# Patient Record
Sex: MALE | Race: BLACK OR AFRICAN AMERICAN | Employment: OTHER | URBAN - METROPOLITAN AREA
[De-identification: names, ages, dates, MRNs, and addresses within clinical notes are randomized per-mention and may not be internally consistent; named-entity substitution may affect disease eponyms.]

---

## 2017-01-01 ENCOUNTER — OFFICE VISIT (OUTPATIENT)
Dept: ORTHOPEDIC SURGERY | Facility: CLINIC | Age: 82
End: 2017-01-01

## 2017-01-01 ENCOUNTER — OFFICE VISIT (OUTPATIENT)
Dept: ORTHOPEDIC SURGERY | Age: 82
End: 2017-01-01

## 2017-01-01 VITALS
HEIGHT: 70 IN | RESPIRATION RATE: 18 BRPM | OXYGEN SATURATION: 100 % | DIASTOLIC BLOOD PRESSURE: 63 MMHG | BODY MASS INDEX: 24.6 KG/M2 | WEIGHT: 171.8 LBS | HEART RATE: 72 BPM | SYSTOLIC BLOOD PRESSURE: 171 MMHG | TEMPERATURE: 95.1 F

## 2017-01-01 VITALS
HEART RATE: 80 BPM | OXYGEN SATURATION: 97 % | SYSTOLIC BLOOD PRESSURE: 149 MMHG | BODY MASS INDEX: 24.39 KG/M2 | RESPIRATION RATE: 18 BRPM | DIASTOLIC BLOOD PRESSURE: 49 MMHG | HEIGHT: 70 IN | WEIGHT: 170.4 LBS | TEMPERATURE: 98.3 F

## 2017-01-01 VITALS
TEMPERATURE: 98 F | RESPIRATION RATE: 16 BRPM | WEIGHT: 170.2 LBS | DIASTOLIC BLOOD PRESSURE: 66 MMHG | HEART RATE: 86 BPM | SYSTOLIC BLOOD PRESSURE: 165 MMHG | BODY MASS INDEX: 24.42 KG/M2

## 2017-01-01 DIAGNOSIS — M48.061 LUMBAR STENOSIS: Primary | Chronic | ICD-10-CM

## 2017-01-01 DIAGNOSIS — M25.511 CHRONIC RIGHT SHOULDER PAIN: ICD-10-CM

## 2017-01-01 DIAGNOSIS — M25.551 RIGHT HIP PAIN: Primary | ICD-10-CM

## 2017-01-01 DIAGNOSIS — M19.011 ARTHROSIS OF RIGHT ACROMIOCLAVICULAR JOINT: ICD-10-CM

## 2017-01-01 DIAGNOSIS — M12.811 ROTATOR CUFF ARTHROPATHY, RIGHT: ICD-10-CM

## 2017-01-01 DIAGNOSIS — M19.011 PRIMARY OSTEOARTHRITIS OF RIGHT SHOULDER: Primary | ICD-10-CM

## 2017-01-01 DIAGNOSIS — M48.062 SPINAL STENOSIS OF LUMBAR REGION WITH NEUROGENIC CLAUDICATION: Chronic | ICD-10-CM

## 2017-01-01 DIAGNOSIS — G89.29 CHRONIC RIGHT SHOULDER PAIN: ICD-10-CM

## 2017-01-01 DIAGNOSIS — M85.80 OSTEOPENIA DETERMINED BY X-RAY: ICD-10-CM

## 2017-01-01 RX ORDER — DICLOFENAC SODIUM 10 MG/G
GEL TOPICAL
Refills: 0 | COMMUNITY
Start: 2017-01-01

## 2017-01-01 RX ORDER — BETAMETHASONE SODIUM PHOSPHATE AND BETAMETHASONE ACETATE 3; 3 MG/ML; MG/ML
6 INJECTION, SUSPENSION INTRA-ARTICULAR; INTRALESIONAL; INTRAMUSCULAR; SOFT TISSUE ONCE
Qty: 0.5 ML | Refills: 0
Start: 2017-01-01 | End: 2017-01-01

## 2017-01-01 RX ORDER — HYDROCODONE BITARTRATE AND ACETAMINOPHEN 10; 325 MG/1; MG/1
1 TABLET ORAL
Qty: 28 TAB | Refills: 0 | Status: SHIPPED | OUTPATIENT
Start: 2017-01-01 | End: 2018-01-01 | Stop reason: SDUPTHER

## 2017-01-01 RX ORDER — HYDROCODONE BITARTRATE AND ACETAMINOPHEN 10; 325 MG/1; MG/1
1 TABLET ORAL
Qty: 30 TAB | Refills: 0 | Status: SHIPPED | OUTPATIENT
Start: 2017-01-01 | End: 2017-01-01 | Stop reason: SDUPTHER

## 2017-01-01 RX ORDER — LANOLIN ALCOHOL/MO/W.PET/CERES
CREAM (GRAM) TOPICAL
Refills: 0 | COMMUNITY
Start: 2017-01-01

## 2017-01-01 RX ORDER — BUPIVACAINE HYDROCHLORIDE 2.5 MG/ML
4 INJECTION, SOLUTION EPIDURAL; INFILTRATION; INTRACAUDAL ONCE
Qty: 4 ML | Refills: 0
Start: 2017-01-01 | End: 2017-01-01

## 2017-01-01 RX ORDER — AZITHROMYCIN 250 MG/1
TABLET, FILM COATED ORAL
Refills: 0 | Status: ON HOLD | COMMUNITY
Start: 2017-01-01 | End: 2018-01-01

## 2017-01-03 ENCOUNTER — APPOINTMENT (OUTPATIENT)
Dept: GENERAL RADIOLOGY | Age: 82
End: 2017-01-03
Attending: PHYSICAL MEDICINE & REHABILITATION
Payer: MEDICARE

## 2017-01-03 ENCOUNTER — SURGERY (OUTPATIENT)
Age: 82
End: 2017-01-03

## 2017-01-03 PROCEDURE — 77003 FLUOROGUIDE FOR SPINE INJECT: CPT

## 2017-01-03 RX ADMIN — DEXAMETHASONE SODIUM PHOSPHATE 30 MG: 10 INJECTION INTRAMUSCULAR; INTRAVENOUS at 13:33

## 2017-01-03 RX ADMIN — LIDOCAINE HYDROCHLORIDE 10 ML: 10 INJECTION, SOLUTION EPIDURAL; INFILTRATION; INTRACAUDAL; PERINEURAL at 13:34

## 2017-01-03 RX ADMIN — IOPAMIDOL 1 ML: 408 INJECTION, SOLUTION INTRATHECAL at 13:34

## 2017-01-05 ENCOUNTER — OFFICE VISIT (OUTPATIENT)
Dept: ORTHOPEDIC SURGERY | Facility: CLINIC | Age: 82
End: 2017-01-05

## 2017-01-05 VITALS
SYSTOLIC BLOOD PRESSURE: 136 MMHG | HEART RATE: 70 BPM | WEIGHT: 171 LBS | TEMPERATURE: 98 F | BODY MASS INDEX: 24.54 KG/M2 | DIASTOLIC BLOOD PRESSURE: 56 MMHG

## 2017-01-05 DIAGNOSIS — M48.061 LUMBAR SPINAL STENOSIS: ICD-10-CM

## 2017-01-05 DIAGNOSIS — M25.551 ARTHRALGIA OF RIGHT HIP: ICD-10-CM

## 2017-01-05 DIAGNOSIS — M70.61 TROCHANTERIC BURSITIS OF RIGHT HIP: ICD-10-CM

## 2017-01-05 DIAGNOSIS — M25.511 CHRONIC RIGHT SHOULDER PAIN: ICD-10-CM

## 2017-01-05 DIAGNOSIS — M54.41 CHRONIC BILATERAL LOW BACK PAIN WITH RIGHT-SIDED SCIATICA: ICD-10-CM

## 2017-01-05 DIAGNOSIS — M48.061 LUMBAR STENOSIS: ICD-10-CM

## 2017-01-05 DIAGNOSIS — M19.011 PRIMARY OSTEOARTHRITIS OF RIGHT SHOULDER: ICD-10-CM

## 2017-01-05 DIAGNOSIS — M25.511 RIGHT SHOULDER PAIN, UNSPECIFIED CHRONICITY: ICD-10-CM

## 2017-01-05 DIAGNOSIS — M19.011 ARTHROSIS OF RIGHT ACROMIOCLAVICULAR JOINT: ICD-10-CM

## 2017-01-05 DIAGNOSIS — G89.29 CHRONIC RIGHT SHOULDER PAIN: ICD-10-CM

## 2017-01-05 DIAGNOSIS — M12.811 ROTATOR CUFF ARTHROPATHY, RIGHT: Primary | ICD-10-CM

## 2017-01-05 DIAGNOSIS — G89.29 CHRONIC BILATERAL LOW BACK PAIN WITH RIGHT-SIDED SCIATICA: ICD-10-CM

## 2017-01-05 DIAGNOSIS — M51.26 HNP (HERNIATED NUCLEUS PULPOSUS), LUMBAR: ICD-10-CM

## 2017-01-05 DIAGNOSIS — M75.51 SHOULDER BURSITIS, RIGHT: ICD-10-CM

## 2017-01-05 DIAGNOSIS — M85.80 OSTEOPENIA DETERMINED BY X-RAY: ICD-10-CM

## 2017-01-05 RX ORDER — BETAMETHASONE SODIUM PHOSPHATE AND BETAMETHASONE ACETATE 3; 3 MG/ML; MG/ML
3 INJECTION, SUSPENSION INTRA-ARTICULAR; INTRALESIONAL; INTRAMUSCULAR; SOFT TISSUE ONCE
Qty: 0.5 ML | Refills: 0
Start: 2017-01-05 | End: 2017-01-05

## 2017-01-05 RX ORDER — BUPIVACAINE HYDROCHLORIDE 2.5 MG/ML
4 INJECTION, SOLUTION EPIDURAL; INFILTRATION; INTRACAUDAL ONCE
Qty: 4 ML | Refills: 0
Start: 2017-01-05 | End: 2017-01-05

## 2017-01-05 NOTE — PROGRESS NOTES
Patient: Rocco Maldonado MRN: [id number]       SSN: [id number]  YOB: 1930        AGE: 80 y.o. SEX: male    PCP: Aminah Locke MD  01/05/17    Chief Complaint   Patient presents with    Shoulder Pain     Right     HISTORY:  Rocco Yadav is a 80 y.o. male who is seen for right shoulder pain. He reports increased shoulder pain since 12/25/16. He was previously seen on 1/4/13 for shoulder pain. He has a h/o right shoulder arthritis and rotator cuff arthropathy in the past.  He has received injections from Dr. Felder Collar years ago. He notes pain with overhead activities and wood-working. He notes extreme pain at times when he raises his arm overhead. He reports increased shoulder pain while doing strength training at home recently. He denies any previous shoulder injury or trauma. He is using a single-point cane to ambulate. Occupation, etc:  Mr. 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 He retired in [place] [address] as a . 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 Current weight is 171 pounds. He has high blood pressure. He is not diabetic. He reports a h/o gout. He states that he almost cut his entire right hand off when he was 25years old as a result of a bakery accident. He is currently taking Coumadin. He is accompanied to the office by his daughter today who helps to take care of him. He lives with his four daughters in [facility].       Weight Metrics 1/5/2017 1/3/2017 12/21/2016 12/12/2016 10/19/2016 9/15/2016 8/30/2016   Weight 171 lb 174 lb 178 lb 178 lb 12.8 oz 175 lb 188 lb 3.2 oz 174 lb   BMI 24.54 kg/m2 24.97 kg/m2 25.54 kg/m2 25.66 kg/m2 25.11 kg/m2 27 kg/m2 24.97 kg/m2     Patient Active Problem List   Diagnosis Code    Renal mass N28.89    Back pain M54.9    Prostate cancer ([facility] C61    Gout M10.9    Asthma J45.909    Hypertension I10    Thyroid disease E07.9    [facility] C79.51    Lumbar stenosis M48.06    Lumbar neuritis M54.16    Chronic bilateral low back pain with sciatica M54.40, G89.29     REVIEW OF SYSTEMS: All Below are Negative except: See HPI   Constitutional: negative for fever, chills, and weight loss. Cardiovascular: negative for chest pain, claudication, leg swelling, SOB, PRIETO   Gastrointestinal: Negative for pain, N/V/C/D, Blood in stool or urine, dysuria,  hematuria, incontinence, pelvic pain. Musculoskeletal: See HPI   Neurological: Negative for dizziness and weakness. Negative for headaches, Visual changes, confusion, seizures   Phychiatric/Behavioral: Negative for depression, memory loss, substance  abuse. Extremities: Negative for hair changes, rash, or skin lesion changes. Hematologic: Negative for bleeding problems, bruising, pallor or swollen lymph  nodes   Peripheral Vascular: No calf pain, no circulation deficits. Social History     Social History    Marital status: SINGLE     Spouse name: N/A    Number of children: N/A    Years of education: N/A     Occupational History    Not on file. Social History Main Topics    Smoking status: Former Smoker    Smokeless tobacco: Never Used      Comment: Quit in 1953    Alcohol use No    Drug use: No    Sexual activity: Not on file     Other Topics Concern    Not on file     Social History Narrative      Allergies   Allergen Reactions    Sulfur Rash    Cephalexin Swelling    Colchicine Swelling    Tramadol Nausea Only      Current Outpatient Prescriptions   Medication Sig    nortriptyline (PAMELOR) 10 mg capsule Take 1 capsule by mouth  nightly    triamcinolone acetonide (KENALOG) 0.1 % topical cream Apply  to affected area.  COMBIGAN 0.2-0.5 % drop ophthalmic solution Administer 1 Drop to both eyes every twelve (12) hours.  lisinopril (PRINIVIL, ZESTRIL) 20 mg tablet Take 20 mg by mouth.     latanoprost (XALATAN) 0.005 % ophthalmic solution Administer 1 Drop to both eyes nightly.  lactulose (CHRONULAC) 10 gram/15 mL solution     amLODIPine (NORVASC) 10 mg tablet Take 10 mg by mouth daily.  aspirin (ASPIRIN) 325 mg tablet Take 325 mg by mouth daily.  allopurinol (ZYLOPRIM) 100 mg tablet Take 100 mg by mouth daily.  HYDROcodone-acetaminophen (NORCO)  mg tablet Take 1 Tab by mouth daily as needed for Pain. Indications: PAIN    oxybutynin chloride XL (DITROPAN XL) 10 mg CR tablet Take 1 Tab by mouth daily. Indications: BLADDER HYPERACTIVITY, INCREASED URINARY FREQUENCY    leuprolide, 6-month, (LUPRON) 45 mg injection Injection given SQ     No current facility-administered medications for this visit. PHYSICAL EXAMINATION:  Visit Vitals    /56 (BP 1 Location: Left arm, BP Patient Position: Sitting)    Pulse 70    Temp 98 °F (36.7 °C) (Oral)    Wt 171 lb (77.6 kg)    BMI 24.54 kg/m2     ORTHO EXAMINATION:  Examination Right shoulder Left shoulder   Skin Intact Intact   Effusion - -   Biceps deformity - -   Atrophy - -   AC joint tenderness - -   Acromial tenderness + +   Biceps tenderness - -   Forward flexion/Elevation  170   Active abduction  160   External rotation ROM 30 30   Internal rotation ROM 70 70   Apprehension - -   Impingement - -   Drop Arm Test - -   Neurovascular Intact Intact   Using a single-point cane to ambulate    PROCEDURE:  After discussing treatment options, patient's right shoulder was injected with 4 cc Marcaine and 1/2 cc Celestone.     Chart reviewed for the following:   Shlomo Vinson MD, have reviewed the History, Physical and updated the Allergic reactions for [address] [place] [address] performed immediately prior to start of procedure:  Shlomo Vinson MD, have performed the following reviews on [facility]. prior to the start of the procedure:            * Patient was identified by name and date of birth   * Agreement on procedure being performed was verified  * Risks and Benefits explained to the patient  * Procedure site verified and marked as necessary  * Patient was positioned for comfort  * Consent was obtained     Time: 12:08 PM     Date of procedure: 1/5/2017    Procedure performed by:  Arslan Flores MD    Mr. Fermín Grant tolerated the procedure well with no complications. RADIOGRAPHS:  XR RIGHT SHOULDER 1/5/17  IMPRESSION:  No fractures, complete acromioclavicular narrowing, severe glenohumeral and subacromial narrowing, no calcific densities. XR RIGHT SHOULDER 1/4/13  IMPRESSION:  No fractures, severe acromioclavicular narrowing, severe glenohumeral narrowing, no calcific densities, severe subacromial narrowing, small inferior acromioclavicular spur formation, osteopenia by x ray. IMPRESSION:      ICD-10-CM ICD-9-CM    1. Rotator cuff arthropathy, right M12.811 716.81 betamethasone (CELESTONE SOLUSPAN) 6 mg/mL injection      BETAMETHASONE ACETATE & SODIUM PHOSPHATE INJECTION 3 MG EA.      DRAIN/INJECT LARGE JOINT/BURSA      bupivacaine, PF, (MARCAINE, PF,) 0.25 % (2.5 mg/mL) injection      REFERRAL TO PHYSICAL THERAPY   2. Right shoulder pain, unspecified chronicity M25.511 719.41 AMB POC XRAY, SHOULDER; COMPLETE, 2+      betamethasone (CELESTONE SOLUSPAN) 6 mg/mL injection      BETAMETHASONE ACETATE & SODIUM PHOSPHATE INJECTION 3 MG EA.      DRAIN/INJECT LARGE JOINT/BURSA      bupivacaine, PF, (MARCAINE, PF,) 0.25 % (2.5 mg/mL) injection      REFERRAL TO PHYSICAL THERAPY   3. Lumbar stenosis M48.06 724.02    4. Chronic bilateral low back pain with right-sided sciatica M54.41 724.2     G89.29 724.3      338.29    5. Lumbar spinal stenosis M48.06 724.02    6. HNP (herniated nucleus pulposus), lumbar, R L5/S1 M51.26 722.10    7. Trochanteric bursitis of right hip M70.61 726.5    8. Arthralgia of right hip M25.551 719.45    9.  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      DRAIN/INJECT LARGE JOINT/BURSA      bupivacaine, PF, (MARCAINE, PF,) 0.25 % (2.5 mg/mL) injection      REFERRAL TO PHYSICAL THERAPY   10. Shoulder bursitis, right M75.51 726.10 betamethasone (CELESTONE SOLUSPAN) 6 mg/mL injection      BETAMETHASONE ACETATE & SODIUM PHOSPHATE INJECTION 3 MG EA.      DRAIN/INJECT LARGE JOINT/BURSA      bupivacaine, PF, (MARCAINE, PF,) 0.25 % (2.5 mg/mL) injection      REFERRAL TO PHYSICAL THERAPY   11. Chronic right shoulder pain M25.511 719.41 betamethasone (CELESTONE SOLUSPAN) 6 mg/mL injection    G89.29 338.29 BETAMETHASONE ACETATE & SODIUM PHOSPHATE INJECTION 3 MG EA.      DRAIN/INJECT LARGE JOINT/BURSA      bupivacaine, PF, (MARCAINE, PF,) 0.25 % (2.5 mg/mL) injection      REFERRAL TO PHYSICAL THERAPY   12. Primary osteoarthritis of right shoulder M19.011 715.11 betamethasone (CELESTONE SOLUSPAN) 6 mg/mL injection      BETAMETHASONE ACETATE & SODIUM PHOSPHATE INJECTION 3 MG EA.      DRAIN/INJECT LARGE JOINT/BURSA      bupivacaine, PF, (MARCAINE, PF,) 0.25 % (2.5 mg/mL) injection      REFERRAL TO PHYSICAL THERAPY   13. Osteopenia determined by x-ray M85.80 733.90      PLAN:  After discussing treatment options, patient's right shoulder was injected with 4 cc Marcaine and 1/2 cc Celestone. He will follow up as needed. We discussed possible need for reverse right total shoulder arthroplasty at some time in the future. He wishes to hold off on surgery for now due to his advanced age. He will start a brief course of outpatient physical therapy to his right shoulder.       Scribed by [employer] ([facility]) as dictated by Germán Pettit MD

## 2017-01-05 NOTE — PATIENT INSTRUCTIONS
Rotator Cuff: Exercises  Your Care Instructions  Here are some examples of typical rehabilitation exercises for your condition. Start each exercise slowly. Ease off the exercise if you start to have pain. Your doctor or physical therapist will tell you when you can start these exercises and which ones will work best for you. How to do the exercises  Pendulum swing    Note: If you have pain in your back, do not do this exercise. 1. Hold on to a table or the back of a chair with your good arm. Then bend forward a little and let your sore arm hang straight down. This exercise does not use the arm muscles. Rather, use your legs and your hips to create movement that makes your arm swing freely. 2. Use the movement from your hips and legs to guide the slightly swinging arm back and forth like a pendulum (or elephant trunk). Then guide it in circles that start small (about the size of a dinner plate). Make the circles a bit larger each day, as your pain allows. 3. Do this exercise for 5 minutes, 5 to 7 times each day. 4. As you have less pain, try bending over a little farther to do this exercise. This will increase the amount of movement at your shoulder. Posterior stretching exercise    1. Hold the elbow of your injured arm with your other hand. 2. Use your hand to pull your injured arm gently up and across your body. You will feel a gentle stretch across the back of your injured shoulder. 3. Hold for at least 15 to 30 seconds. Then slowly lower your arm. 4. Repeat 2 to 4 times. Up-the-back stretch    Note: Your doctor or physical therapist may want you to wait to do this stretch until you have regained most of your range of motion and strength. You can do this stretch in different ways. Hold any of these stretches for at least 15 to 30 seconds. Repeat them 2 to 4 times. 1. Put your hand in your back pocket. Let it rest there to stretch your shoulder.   2. With your other hand, hold your injured arm (palm outward) behind your back by the wrist. Pull your arm up gently to stretch your shoulder. 3. Next, put a towel over your other shoulder. Put the hand of your injured arm behind your back. Now hold the back end of the towel. With the other hand, hold the front end of the towel in front of your body. Pull gently on the front end of the towel. This will bring your hand farther up your back to stretch your shoulder. Overhead stretch    1. Standing about an arm's length away, grasp onto a solid surface. You could use a countertop, a doorknob, or the back of a sturdy chair. 2. With your knees slightly bent, bend forward with your arms straight. Lower your upper body, and let your shoulders stretch. 3. As your shoulders are able to stretch farther, you may need to take a step or two backward. 4. Hold for at least 15 to 30 seconds. Then stand up and relax. If you had stepped back during your stretch, step forward so you can keep your hands on the solid surface. 5. Repeat 2 to 4 times. Shoulder flexion (lying down)    Note: To make a wand for this exercise, use a piece of PVC pipe or a broom handle with the broom removed. Make the wand about a foot wider than your shoulders. 1. Lie on your back, holding a wand with both hands. Your palms should face down as you hold the wand. 2. Keeping your elbows straight, slowly raise your arms over your head. Raise them until you feel a stretch in your shoulders, upper back, and chest.  3. Hold for 15 to 30 seconds. 4. Repeat 2 to 4 times. Shoulder rotation (lying down)    Note: To make a wand for this exercise, use a piece of PVC pipe or a broom handle with the broom removed. Make the wand about a foot wider than your shoulders. 1. Lie on your back. Hold a wand with both hands with your elbows bent and palms up. 2. Keep your elbows close to your body, and move the wand across your body toward the sore arm. 3. Hold for 8 to 12 seconds. 4. Repeat 2 to 4 times.   Marty Zamudio climbing (to the side)    Note: Avoid any movement that is straight to your side, and be careful not to arch your back. Your arm should stay about 30 degrees to the front of your side. 1. Stand with your side to a wall so that your fingers can just touch it at an angle about 30 degrees toward the front of your body. 2. Walk the fingers of your injured arm up the wall as high as pain permits. Try not to shrug your shoulder up toward your ear as you move your arm up. 3. Hold that position for a count of at least 15 to 20.  4. Walk your fingers back down to the starting position. 5. Repeat at least 2 to 4 times. Try to reach higher each time. Wall climbing (to the front)    Note: During this stretching exercise, be careful not to arch your back. 1. Face a wall, and stand so your fingers can just touch it. 2. Keeping your shoulder down, walk the fingers of your injured arm up the wall as high as pain permits. (Don't shrug your shoulder up toward your ear.)  3. Hold your arm in that position for at least 15 to 30 seconds. 4. Slowly walk your fingers back down to where you started. 5. Repeat at least 2 to 4 times. Try to reach higher each time. Shoulder blade squeeze    1. Stand with your arms at your sides, and squeeze your shoulder blades together. Do not raise your shoulders up as you squeeze. 2. Hold 6 seconds. 3. Repeat 8 to 12 times. Scapular exercise: Arm reach    1. Lie flat on your back. This exercise is a very slight motion that starts with your arms raised (elbows straight, arms straight). 2. From this position, reach higher toward the [place] or ceiling. Keep your elbows straight. All motion should be from your shoulder blade only. 3. Relax your arms back to where you started. 4. Repeat 8 to 12 times. Arm raise to the side    Note: During this strengthening exercise, your arm should stay about 30 degrees to the front of your side.   1. Slowly raise your injured arm to the side, with your thumb facing up. Raise your arm 60 degrees at the most (shoulder level is 90 degrees). 2. Hold the position for 3 to 5 seconds. Then lower your arm back to your side. If you need to, bring your \"good\" arm across your body and place it under the elbow as you lower your injured arm. Use your good arm to keep your injured arm from dropping down too fast.  3. Repeat 8 to 12 times. 4. When you first start out, don't hold any extra weight in your hand. As you get stronger, you may use a 1-pound to 2-pound dumbbell or a small can of food. Shoulder flexor and extensor exercise    Note: These are isometric exercises. That means you contract your muscles without actually moving. · Push forward (flex): Stand facing a wall or doorjamb, about 6 inches or less back. Hold your injured arm against your body. Make a closed fist with your thumb on top. Then gently push your hand forward into the wall with about 25% to 50% of your strength. Don't let your body move backward as you push. Hold for about 6 seconds. Relax for a few seconds. Repeat 8 to 12 times. · Push backward (extend): Stand with your back flat against a wall. Your upper arm should be against the wall, with your elbow bent 90 degrees (your hand straight ahead). Push your elbow gently back against the wall with about 25% to 50% of your strength. Don't let your body move forward as you push. Hold for about 6 seconds. Relax for a few seconds. Repeat 8 to 12 times. Scapular exercise: Wall push-ups    Note: This exercise is best done with your fingers somewhat turned out, rather than straight up and down. 1. Stand facing a wall, about 12 inches to 18 inches away. 2. Place your hands on the wall at shoulder height. 3. Slowly bend your elbows and bring your face to the wall. Keep your back and hips straight. 4. Push back to where you started. 5. Repeat 8 to 12 times. 6. When you can do this exercise against a wall comfortably, you can try it against a counter.  You can then slowly progress to the end of a couch, then to a sturdy chair, and finally to the floor. Scapular exercise: Retraction    Note: For this exercise, you will need elastic exercise material, such as surgical tubing or Thera-Band. 1. Put the band around a solid object at about waist level. (A bedpost will work well.) Each hand should hold an end of the band. 2. With your elbows at your sides and bent to 90 degrees, pull the band back. Your shoulder blades should move toward each other. Then move your arms back where you started. 3. Repeat 8 to 12 times. 4. If you have good range of motion in your shoulders, try this exercise with your arms lifted out to the sides. Keep your elbows at a 90-degree angle. Raise the elastic band up to about shoulder level. Pull the band back to move your shoulder blades toward each other. Then move your arms back where you started. Internal rotator strengthening exercise    1. Start by tying a piece of elastic exercise material to a doorknob. You can use surgical tubing or Thera-Band. 2. Stand or sit with your shoulder relaxed and your elbow bent 90 degrees. Your upper arm should rest comfortably against your side. Squeeze a rolled towel between your elbow and your body for comfort. This will help keep your arm at your side. 3. Hold one end of the elastic band in the hand of the painful arm. 4. Slowly rotate your forearm toward your body until it touches your belly. Slowly move it back to where you started. 5. Keep your elbow and upper arm firmly tucked against the towel roll or at your side. 6. Repeat 8 to 12 times. External rotator strengthening exercise    1. Start by tying a piece of elastic exercise material to a doorknob. You can use surgical tubing or Thera-Band. (You may also hold one end of the band in each hand.)  2. Stand or sit with your shoulder relaxed and your elbow bent 90 degrees. Your upper arm should rest comfortably against your side.  Squeeze a rolled towel between your elbow and your body for comfort. This will help keep your arm at your side. 3. Hold one end of the elastic band with the hand of the painful arm. 4. Start with your forearm across your belly. Slowly rotate the forearm out away from your body. Keep your elbow and upper arm tucked against the towel roll or the side of your body until you begin to feel tightness in your shoulder. Slowly move your arm back to where you started. 5. Repeat 8 to 12 times. Follow-up care is a key part of your treatment and safety. Be sure to make and go to all appointments, and call your doctor if you are having problems. It's also a good idea to know your test results and keep a list of the medicines you take. Where can you learn more? Go to http://angelica-vishnu.info/. Enter Ashley Herron in the search box to learn more about \"Rotator Cuff: Exercises. \"  Current as of: May 23, 2016  Content Version: 11.1  © 8346-0224 "[employer]. Care instructions adapted under license by [employer] (which disclaims liability or warranty for this information). If you have questions about a medical condition or this instruction, always ask your healthcare professional. [address] any warranty or liability for your use of this information. Shoulder Arthritis: Exercises  Your Care Instructions  Here are some examples of typical rehabilitation exercises for your condition. Start each exercise slowly. Ease off the exercise if you start to have pain. Your doctor or physical therapist will tell you when you can start these exercises and which ones will work best for you. How to do the exercises  Shoulder flexion (lying down)    Note: To make a wand for this exercise, use a piece of PVC pipe or a broom handle with the broom removed. Make the wand about a foot wider than your shoulders. 5. Lie on your back, holding a wand with both hands.  Your palms should face down as you hold the wand. 6. Keeping your elbows straight, slowly raise your arms over your head. Raise them until you feel a stretch in your shoulders, upper back, and chest.  7. Hold for 15 to 30 seconds. 8. Repeat 2 to 4 times. Shoulder rotation (lying down)    Note: To make a wand for this exercise, use a piece of PVC pipe or a broom handle with the broom removed. Make the wand about a foot wider than your shoulders. 5. Lie on your back. Hold a wand with both hands with your elbows bent and palms up. 6. Keep your elbows close to your body, and move the wand across your body toward the sore arm. 7. Hold for 8 to 12 seconds. 8. Repeat 2 to 4 times. Shoulder internal rotation with towel    4. Hold a towel above and behind your head with the arm that is not sore. 5. With your sore arm, reach behind your back and grasp the towel. 6. With the arm above your head, pull the towel upward. Do this until you feel a stretch on the front and outside of your sore shoulder. 7. Hold 15 to 30 seconds. 8. Repeat 2 to 4 times. Shoulder blade squeeze    6. Stand with your arms at your sides, and squeeze your shoulder blades together. Do not raise your shoulders up as you squeeze. 7. Hold 6 seconds. 8. Repeat 8 to 12 times. Resisted rows    Note: For this exercise, you will need elastic exercise material, such as surgical tubing or Thera-Band. 5. Put the band around a solid object at about waist level. (A bedpost will work well.) Each hand should hold an end of the band. 6. With your elbows at your sides and bent to 90 degrees, pull the band back. Your shoulder blades should move toward each other. Return to the starting position. 7. Repeat 8 to 12 times. External rotator strengthening exercise    5. Start by tying a piece of elastic exercise material to a doorknob. You can use surgical tubing or Thera-Band. (You may also hold one end of the band in each hand.)  6.  Stand or sit with your shoulder relaxed and your elbow bent 90 degrees. Your upper arm should rest comfortably against your side. Squeeze a rolled towel between your elbow and your body for comfort. This will help keep your arm at your side. 7. Hold one end of the elastic band with the hand of the painful arm. 8. Start with your forearm across your belly. Slowly rotate the forearm out away from your body. Keep your elbow and upper arm tucked against the towel roll or the side of your body until you begin to feel tightness in your shoulder. Slowly move your arm back to where you started. 9. Repeat 8 to 12 times. Internal rotator strengthening exercise    6. Start by tying a piece of elastic exercise material to a doorknob. You can use surgical tubing or Thera-Band. 7. Stand or sit with your shoulder relaxed and your elbow bent 90 degrees. Your upper arm should rest comfortably against your side. Squeeze a rolled towel between your elbow and your body for comfort. This will help keep your arm at your side. 8. Hold one end of the elastic band in the hand of the painful arm. 9. Slowly rotate your forearm toward your body until it touches your belly. Slowly move it back to where you started. 10. Keep your elbow and upper arm firmly tucked against the towel roll or at your side. 11. Repeat 8 to 12 times. Pendulum swing    Note: If you have pain in your back, do not do this exercise. 6. Hold on to a table or the back of a chair with your good arm. Then bend forward a little and let your sore arm hang straight down. This exercise does not use the arm muscles. Rather, use your legs and your hips to create movement that makes your arm swing freely. 7. Use the movement from your hips and legs to guide the slightly swinging arm back and forth like a pendulum (or elephant trunk). Then guide it in circles that start small (about the size of a dinner plate). Make the circles a bit larger each day, as your pain allows.   8. Do this exercise for 5 minutes, 5 to 7 times each day.  9. As you have less pain, try bending over a little farther to do this exercise. This will increase the amount of movement at your shoulder. Follow-up care is a key part of your treatment and safety. Be sure to make and go to all appointments, and call your doctor if you are having problems. It's also a good idea to know your test results and keep a list of the medicines you take. Where can you learn more? Go to http://angelica-vishnu.info/. Enter H562 in the search box to learn more about \"Shoulder Arthritis: Exercises. \"  Current as of: May 23, 2016  Content Version: 11.1  © 2003-2218 [employer]. Care instructions adapted under license by [facility] (which disclaims liability or warranty for this information). If you have questions about a medical condition or this instruction, always ask your healthcare professional. [place] [address] any warranty or liability for your use of this information. Joint Injections: Care Instructions  Your Care Instructions  Joint injections are shots into a joint, such as the knee. They may be used to put in medicines, such as pain relievers. Or they can be used to take out fluid. Sometimes the fluid is tested in a lab. This can help find the cause of a joint problem. A corticosteroid, or steroid, shot is used to reduce inflammation in tendons or joints. It is often used to treat problems such as arthritis, tendinitis, and bursitis. Steroids can be injected directly into a painful, inflamed joint. They can also help reduce inflammation of a bursa. A bursa is a sac of fluid. It cushions and lubricates areas where tendons, ligaments, skin, muscles, or bones rub against each other. A steroid shot can sometimes help with short-term pain relief when other treatments haven't worked. If steroid shots help, pain may improve for weeks or months. Follow-up care is a key part of your treatment and safety.  Be sure to make and go to all appointments, and call your doctor if you are having problems. It's also a good idea to know your test results and keep a list of the medicines you take. How can you care for yourself at home? · Put ice or a cold pack on the area for 10 to 20 minutes at a time. Put a thin cloth between the ice and your skin. · Take anti-inflammatory medicines to reduce pain, swelling, or inflammation. These include ibuprofen (Advil, Motrin) and naproxen (Aleve). Read and follow all instructions on the label. · Avoid strenuous activities for several days, especially those that put stress on the area where you got the shot. · If you have dressings over the area, keep them clean and dry. You may remove them when your doctor tells you to. When should you call for help? Call your doctor now or seek immediate medical care if:  · You have signs of infection, such as:  ¨ Increased pain, swelling, warmth, or redness. ¨ Red streaks leading from the site. ¨ Pus draining from the site. ¨ A fever. Watch closely for changes in your health, and be sure to contact your doctor if you have any problems. Where can you learn more? Go to http://angelica-vishnu.info/. Enter N616 in the search box to learn more about \"Joint Injections: Care Instructions. \"  Current as of: May 23, 2016  Content Version: 11.1  © 1900-8106 [employer]. Care instructions adapted under license by [employer] (which disclaims liability or warranty for this information). If you have questions about a medical condition or this instruction, always ask your healthcare professional. [address] any warranty or liability for your use of this information.

## 2017-01-13 ENCOUNTER — HOSPITAL ENCOUNTER (OUTPATIENT)
Dept: PHYSICAL THERAPY | Age: 82
Discharge: HOME | End: 2017-01-13
Payer: MEDICARE

## 2017-01-13 PROCEDURE — G8987 SELF CARE CURRENT STATUS: HCPCS

## 2017-01-13 PROCEDURE — G8988 SELF CARE GOAL STATUS: HCPCS

## 2017-01-13 PROCEDURE — 97162 PT EVAL MOD COMPLEX 30 MIN: CPT

## 2017-01-13 PROCEDURE — 97530 THERAPEUTIC ACTIVITIES: CPT

## 2017-01-13 PROCEDURE — 97110 THERAPEUTIC EXERCISES: CPT

## 2017-01-13 NOTE — PROGRESS NOTES
PT DAILY TREATMENT NOTE/SHOULDER EVAL 3-16    Patient Name: Deisi Gómez.  Date:2017  : 1930  [x]  Patient  Verified  Payor: [employer] Honey / Plan: [place] & B / Product Type: Medicare /    In time:11:08  Out time:11:55  Total Treatment Time (min): 47  Visit #: 1     Treatment Area: Pain in right shoulder [M25.511]  Other specific arthropathies, not elsewhere classified, right shoulder [M12.811]  Primary osteoarthritis, right shoulder [M19.011]  Bursitis of right shoulder [M75.51]  Other chronic pain [G89.29]    SUBJECTIVE  Pain Level (0-10 scale): 7/10  []constant [x]intermittent []improving []worsening []no change since onset    Any medication changes, allergies to medications, adverse drug reactions, diagnosis change, or new procedure performed?: [x] No    [] Yes (see summary sheet for update)  Subjective functional status/changes:       Pt reports that his shoulder started hurting right before Wendi with insidious onset. He used to do some strengthening exercises with 2# weights. He had an injection last week in the shoulder and it helped a little. The shot was in the back and it's hurting more in the front.       Relieving factors: rest.  Aggravating factors: movement (overhead, behind the back)    Barriers: []pain []financial []time [x]transportation [x]other - mentality towards therapy   Motivation: high motivation to get better, low motivation to participate   Substance use: []Alcohol []Tobacco []other: none  FABQ Score: []low [x]elevate - based on clinical judgement and pt's agitation/avoidance of aggravating movements and compliance with objective measures of evaluation     OBJECTIVE/EXAMINATION    20 min [x]Eval                  []Re-Eval       9 min Therapeutic Exercise:  [] See flow sheet : See HEP, attempted scap squeeze    Rationale: increase ROM and increase strength to improve the patients ability to perform ADLs    Pt required a great deal of verbal/visual/tactile cueing to achieve proper form with all therex     18 min Therapeutic Activity:  []  See flow sheet :   Rationale: Educated patient regarding purpose of all objective measurements during evaluation, explained shoulders mechanics and anatomy to pt and then daughter, new therex technique and purpose, instructed pt not to use heat d/t hx of cancer, purpose of therapy, and therapy plan in order to ensure pt understanding/compliance with therapy             With   [] TE   [] TA   [] neuro   [] other: Patient Education: [x] Review HEP    [] Progressed/Changed HEP based on:   [] positioning   [] body mechanics   [] transfers   [] heat/ice application    [] other:      Other Objective/Functional Measures:     /57 taken manually prior to initial evaluation   Pt wanted to know why BP had an relevance to therapy, explained BP norms and BP response to exercise     Physical Therapy Evaluation - Shoulder    Posture: [] Poor    [x] Fair    [] Good    Describe: forward head, rounded shoulders     ROM:  [] Unable to assess at this time                                           AROM                                                              PROM   Left Right  Left Right   Flexion 124 46      Extension        Scaption/ 62 Scaptin/ABD     Functional ER T3 PD ER @ 0 Degrees     Functional IR T11 Just anterior to greater trochanter ER @ 90 Degrees         Strength:   [] Unable to assess at this time                                                                            L (1-5) R (1-5) Pain   Flexors 4 2+ [x] Yes   [] No   Abductors 4 2+ [x] Yes   [] No   External Rotators 4 PD [x] Yes   [] No   Internal Rotators 4 PD [x] Yes   [] No   Supraspinatus   [] Yes   [] No   Serratus Anterior   [] Yes   [] No   Lower Trapezius   [] Yes   [] No   Elbow Flexion   [] Yes   [] No   Elbow Extension   [] Yes   [] No       Scapulohumoral Control / Rhythm: not assessed d/t pt's agitation     Accessory Motions: significant shoulder shrug with elevation     Palpation  [] Min  [x] Mod  [] Severe    Location: TTP R subacromial space, pec major tendon, UT, bicep  [] Min  [x] Mod  [] Severe    Location: hypertonicity R UT    Optional Tests:    Special Tests were not performed as pt was becoming uncooporative and argumentative with therapist       Other Tests / Comments:   Pt became frustrated that therapist did not have all of his medical information, becoming aggitated during subjective portion of evaluation that therapist had to ask questions   When asked about ADLs, pt responded \"If I could do those things, do you think I'd be here\"  Pt became agitated that therapist was looking at his L arm continually reporting that the pain was on his R arm despite therapist explaining the importance of getting norms from L arm  Patient became agitated with therapist during attempted ROM and strength testing, reporting that therapist was hurting him and reporting that he was not going to cause himself pain. \"What kind of treatment are you running here\"    Attempted to explain findings of evaluation 3 times but pt was agitated, asking therapist to go get his daughter and explain  Explained findings to daughter and she explained findings to pt, with pt becoming less agitated    Attempted scap squeezes but pt reported increased pain, demonstrating shoulder shrug and decreased retraction despite verbal/tactile/visual cuing. Ceased after 3 attempts      Required cuing for form with UT stretch as pt demonstrated trunk lean and shoulder shrug initially, improved with cueing     Adjusted cane to proper height as cane was too high, causing shrug with ambulation. Pt reported, \"It's OK\" with adjusted cane height.      Pain Level (0-10 scale) post treatment: 7-8/10    ASSESSMENT/Changes in Function: See POC    Patient will continue to benefit from skilled PT services to modify and progress therapeutic interventions, address functional mobility deficits, address ROM deficits, address strength deficits, analyze and address soft tissue restrictions, analyze and cue movement patterns, analyze and modify body mechanics/ergonomics, assess and modify postural abnormalities and instruct in home and community integration to attain remaining goals.      [x]  See Plan of Care  []  See progress note/recertification  []  See Discharge Summary         Progress towards goals / Updated goals:  See POC    PLAN  [x]  Upgrade activities as tolerated     []  Continue plan of care  [x]  Update interventions per flow sheet       []  Discharge due to:_  []  Other:_      Xin Medina, PT 1/13/2017  11:06 AM

## 2017-01-13 NOTE — PROGRESS NOTES
[facility] [address]  [phone] fax    Plan of Care/ Statement of Necessity for Physical Therapy Services    Patient name: Dayana Gomez Start of Care: 2017   Referral source: Vicente Ceja MD : 1930    Medical Diagnosis: Pain in right shoulder [M25.511]  Other specific arthropathies, not elsewhere classified, right shoulder [M12.811]  Primary osteoarthritis, right shoulder [M19.011]  Bursitis of right shoulder [M75.51]  Other chronic pain [G89.29]   Onset Date:16   Treatment Diagnosis: R Shoulder Pain   Prior Hospitalization: see medical history Provider#: [id number]   Medications: Verified on Patient summary List    Comorbidities: HTN, arthritis, hearing impaired, hx of prostate cancer    Prior Level of Function: , retired singer, lives alone but with family coming over constantly, R handed, walking, light lifting    The Plan of Care and following information is based on the information from the initial evaluation. Assessment/ key information: Pt is a 81 yo M who presents with R shoulder pain of insidious onset 16 which he attributes to lifting 2# weights as part of his exercise routine. Subjective reports of pain increased with movement, especially reaching overhead and behind. Relieved with rest.  Pt presents with poor AROM (flex 46 dgrs, abd 62 dgrs, functional IR directly anterior to greater trochanter). Patient declined PROM and strength testing. He was argumentative throughout evaluation, limiting further evaluation and treatment. Pt is TTP at subacromial space, pec major tendon and bicep, demonstrating UT substitution with elevation and hypertonicity of L UT.   Evaluation was significantly limited by pt's willingness to participate; however, findings consistent with referring diagnosis of arthritis with guarding/poor shoulder mechanics contributing to irritation of surrounding musculature. Pt will benefit from skilled PT to address ROM, strength, flexibility, and postural deficits in order to improve ease of reaching with ADLs and improve QOL. Evaluation Complexity History HIGH Complexity :3+ comorbidities / personal factors will impact the outcome/ POC ; Examination MEDIUM Complexity : 3 Standardized tests and measures addressing body structure, function, activity limitation and / or participation in recreation  ;Presentation MEDIUM Complexity : Evolving with changing characteristics  ; Clinical Decision Making MEDIUM Complexity : FOTO score of 26-74 - based on clinical judgement as FOTO was not completed   Overall Complexity Rating: MEDIUM  Problem List: pain affecting function, decrease ROM, decrease strength, decrease ADL/ functional abilitiies, decrease activity tolerance and decrease flexibility/ joint mobility   Treatment Plan may include any combination of the following: Therapeutic exercise, Therapeutic activities, Neuromuscular re-education, Physical agent/modality, Gait/balance training, Manual therapy, Patient education, Self Care training, Functional mobility training and Home safety training  Patient / Family readiness to learn indicated by: other poor readiness to learn, argumentative when asked to perform skills  Persons(s) to be included in education: patient (P) and family support person (FSP);list daughter  Barriers to Learning/Limitations: yes;  other attitude/openness towards therapy   Patient Goal (s): hope to get some relief  Patient Self Reported Health Status: fair  Rehabilitation Potential: good    Short Term Goals: To be accomplished in 1 weeks:  1. Pt will be compliant with initial HEP  Long Term Goals: To be accomplished in 6 weeks:  1. Pt will improve FOTO by 5 points in order to demonstrate functional improvement   2. Pt will improve R shoulder strength to 3-/5 in order to improve ease of light ADLs  3.  Pt will improve R shoulder elevation to >90dgrs in order to improve ease of reaching overhead  4. Pt will report <5/10 average pain in order to demonstrate improved QOL     Frequency / Duration: Patient to be seen 2-3 times per week for 6 weeks. G-Codes (GP)    Self Care   Current  CL= 60-79%  [id number] Goal  CK= 40-59%    The severity rating is based on clinical judgment and the FOTO score. Certification Period: 1/13/17 to 4/13/17    Patient/ CarPatient/ Caregiver education and instruction: Diagnosis, prognosis, activity modification and exercises   [x]  Plan of care has been reviewed with BRE Kong, PT 1/13/2017 11:06 AM    ________________________________________________________________________    I certify that the above Therapy Services are being furnished while the patient is under my care. I agree with the treatment plan and certify that this therapy is necessary.     Physician's Signature:____________________  Date:____________Time: _________    Please sign and return to [facility] [address]  [phone] fax

## 2017-01-18 ENCOUNTER — APPOINTMENT (OUTPATIENT)
Dept: PHYSICAL THERAPY | Age: 82
End: 2017-01-18
Payer: MEDICARE

## 2017-01-20 ENCOUNTER — APPOINTMENT (OUTPATIENT)
Dept: PHYSICAL THERAPY | Age: 82
End: 2017-01-20
Payer: MEDICARE

## 2017-01-24 ENCOUNTER — APPOINTMENT (OUTPATIENT)
Dept: PHYSICAL THERAPY | Age: 82
End: 2017-01-24
Payer: MEDICARE

## 2017-01-26 ENCOUNTER — APPOINTMENT (OUTPATIENT)
Dept: PHYSICAL THERAPY | Age: 82
End: 2017-01-26
Payer: MEDICARE

## 2017-01-27 ENCOUNTER — APPOINTMENT (OUTPATIENT)
Dept: PHYSICAL THERAPY | Age: 82
End: 2017-01-27
Payer: MEDICARE

## 2017-01-30 ENCOUNTER — APPOINTMENT (OUTPATIENT)
Dept: PHYSICAL THERAPY | Age: 82
End: 2017-01-30
Payer: MEDICARE

## 2017-01-31 ENCOUNTER — OFFICE VISIT (OUTPATIENT)
Dept: ORTHOPEDIC SURGERY | Age: 82
End: 2017-01-31

## 2017-01-31 VITALS
HEIGHT: 70 IN | TEMPERATURE: 98.1 F | DIASTOLIC BLOOD PRESSURE: 60 MMHG | WEIGHT: 174.6 LBS | SYSTOLIC BLOOD PRESSURE: 137 MMHG | HEART RATE: 80 BPM | BODY MASS INDEX: 25 KG/M2 | RESPIRATION RATE: 18 BRPM

## 2017-01-31 DIAGNOSIS — M48.061 LUMBAR STENOSIS: Primary | Chronic | ICD-10-CM

## 2017-01-31 RX ORDER — TIZANIDINE 2 MG/1
2 TABLET ORAL
Qty: 30 TAB | Refills: 1 | Status: SHIPPED | OUTPATIENT
Start: 2017-01-31 | End: 2017-03-03

## 2017-01-31 NOTE — PROGRESS NOTES
Chief complaint/History of Present Illness:  Chief Complaint   Patient presents with    Back Pain     block follow up     HPI  [address] Stevie Ramirez. is a  80 y.o.  male      HISTORY OF PRESENT ILLNESS:  The patient comes in today following his L4-5 lumbar epidural done January 3, 2017. He states it did help the pain that goes into his right hip and leg down to his mid-calf. However, he is getting pulling pain at night in his right leg. It is not really a sharp pain. That pain is gone, but it is more of a spasm type pain. He currently takes Norco 10/325 mg very rarely, Pamelor 10 mg at bedtime and an occasional Tylenol. He denies fever or bowel or bladder dysfunction. He is a nonsmoker. He is retired. PHYSICAL EXAM:  Mr. Morena Miller is an 78-year-old, well-developed, well-nourished male. He is alert and oriented. His skin is warm and dry. He has a full weight bearing, slightly antalgic gait with no assistive device in the room, but he does use a cane, normally, when he walks. He has 4/5 strength of the bilateral lower extremities and negative straight leg raise. He has a little bit of pain with hyperextension of the lumbar spine. ASSESSENT/PLAN:  This is a patient who has spinal stenosis who has benefitted from an L4-5 lumbar epidural.  We are going to try him on a small dose of Zanaflex at night to see if that helps with that spasm type pain in his leg. He will continue his Pamelor. We will see him back in two months or sooner if needed.         Review of systems:    Past Medical History   Diagnosis Date    Arthritis     Asthma     Back pain     Edema     Glaucoma     Gout     History of phimosis 06/20/2008     S/p circumcision     Hypertension     Low back pain radiating to both legs     Prostate cancer ([facility] [address]      [id number], s/p RP f/b XRT in 1992 due to PSA recurrence >20 years ago    Renal mass     Sciatica     Spinal stenosis     Thromboembolus ([address] 2014    Thyroid disease      Past Surgical History   Procedure Laterality Date    Hx other surgical  10/92     [facility]-Laser surgery    Hx other surgical  8/92     Z_PR REMV prostate,retropub, radical    Hx knee replacement      Hx circumcision  06/20/2008     Social History     Social History    Marital status: SINGLE     Spouse name: N/A    Number of children: N/A    Years of education: N/A     Occupational History    Not on file. Social History Main Topics    Smoking status: Former Smoker    Smokeless tobacco: Never Used      Comment: Quit in 1953    Alcohol use No    Drug use: No    Sexual activity: Not on file     Other Topics Concern    Not on file     Social History Narrative     Family History   Problem Relation Age of Onset    Arthritis-osteo Father     Asthma Other        Physical Exam:  Visit Vitals    /60    Pulse 80    Temp 98.1 °F (36.7 °C) (Oral)    Resp 18    Ht 5' 10\" (1.778 m)    Wt 174 lb 9.6 oz (79.2 kg)    BMI 25.05 kg/m2     Pain Scale: 5/10     has been . reviewed and is appropriate      Cetha was seen today for back pain. Diagnoses and all orders for this visit:    Lumbar stenosis    Other orders  -     tiZANidine (ZANAFLEX) 2 mg tablet; Take 1 Tab by mouth nightly as needed. Follow-up Disposition:  Return in about 2 months (around 3/31/2017) for with Sumeet Ramirez.         We have informed 4 H Stevie Ramirez. to notify us for immediate appointment if he has any worsening neurogical symptoms or if an emergency situation presents, then call 911

## 2017-01-31 NOTE — PATIENT INSTRUCTIONS
Back Pain: Care Instructions  Your Care Instructions    Back pain has many possible causes. It is often related to problems with muscles and ligaments of the back. It may also be related to problems with the nerves, discs, or bones of the back. Moving, lifting, standing, sitting, or sleeping in an awkward way can strain the back. Sometimes you don't notice the injury until later. Arthritis is another common cause of back pain. Although it may hurt a lot, back pain usually improves on its own within several weeks. Most people recover in 12 weeks or less. Using good home treatment and being careful not to stress your back can help you feel better sooner. Follow-up care is a key part of your treatment and safety. Be sure to make and go to all appointments, and call your doctor if you are having problems. Its also a good idea to know your test results and keep a list of the medicines you take. How can you care for yourself at home? · Sit or lie in positions that are most comfortable and reduce your pain. Try one of these positions when you lie down:  ¨ Lie on your back with your knees bent and supported by large pillows. ¨ Lie on the floor with your legs on the seat of a sofa or chair. Collene Lias on your side with your knees and hips bent and a pillow between your legs. ¨ Lie on your stomach if it does not make pain worse. · Do not sit up in bed, and avoid soft couches and twisted positions. Bed rest can help relieve pain at first, but it delays healing. Avoid bed rest after the first day of back pain. · Change positions every 30 minutes. If you must sit for long periods of time, take breaks from sitting. Get up and walk around, or lie in a comfortable position. · Try using a heating pad on a low or medium setting for 15 to 20 minutes every 2 or 3 hours. Try a warm shower in place of one session with the heating pad. · You can also try an ice pack for 10 to 15 minutes every 2 to 3 hours.  Put a thin cloth between the ice pack and your skin. · Take pain medicines exactly as directed. ¨ If the doctor gave you a prescription medicine for pain, take it as prescribed. ¨ If you are not taking a prescription pain medicine, ask your doctor if you can take an over-the-counter medicine. · Take short walks several times a day. You can start with 5 to 10 minutes, 3 or 4 times a day, and work up to longer walks. Walk on level surfaces and avoid hills and stairs until your back is better. · Return to work and other activities as soon as you can. Continued rest without activity is usually not good for your back. · To prevent future back pain, do exercises to stretch and strengthen your back and stomach. Learn how to use good posture, safe lifting techniques, and proper body mechanics. When should you call for help? Call your doctor now or seek immediate medical care if:  · You have new or worsening numbness in your legs. · You have new or worsening weakness in your legs. (This could make it hard to stand up.)  · You lose control of your bladder or bowels. Watch closely for changes in your health, and be sure to contact your doctor if:  · Your pain gets worse. · You are not getting better after 2 weeks. Where can you learn more? Go to http://angelica-vishnu.info/. Enter S556 in the search box to learn more about \"Back Pain: Care Instructions. \"  Current as of: May 23, 2016  Content Version: 11.1  © 9985-5259 [employer]. Care instructions adapted under license by [employer] (which disclaims liability or warranty for this information). If you have questions about a medical condition or this instruction, always ask your healthcare professional. [address] any warranty or liability for your use of this information. Back Pain: Care Instructions  Your Care Instructions    Back pain has many possible causes.  It is often related to problems with muscles and ligaments of the back. It may also be related to problems with the nerves, discs, or bones of the back. Moving, lifting, standing, sitting, or sleeping in an awkward way can strain the back. Sometimes you don't notice the injury until later. Arthritis is another common cause of back pain. Although it may hurt a lot, back pain usually improves on its own within several weeks. Most people recover in 12 weeks or less. Using good home treatment and being careful not to stress your back can help you feel better sooner. Follow-up care is a key part of your treatment and safety. Be sure to make and go to all appointments, and call your doctor if you are having problems. Its also a good idea to know your test results and keep a list of the medicines you take. How can you care for yourself at home? · Sit or lie in positions that are most comfortable and reduce your pain. Try one of these positions when you lie down:  ¨ Lie on your back with your knees bent and supported by large pillows. ¨ Lie on the floor with your legs on the seat of a sofa or chair. Rommie Filler on your side with your knees and hips bent and a pillow between your legs. ¨ Lie on your stomach if it does not make pain worse. · Do not sit up in bed, and avoid soft couches and twisted positions. Bed rest can help relieve pain at first, but it delays healing. Avoid bed rest after the first day of back pain. · Change positions every 30 minutes. If you must sit for long periods of time, take breaks from sitting. Get up and walk around, or lie in a comfortable position. · Try using a heating pad on a low or medium setting for 15 to 20 minutes every 2 or 3 hours. Try a warm shower in place of one session with the heating pad. · You can also try an ice pack for 10 to 15 minutes every 2 to 3 hours. Put a thin cloth between the ice pack and your skin. · Take pain medicines exactly as directed.   ¨ If the doctor gave you a prescription medicine for pain, take it as prescribed. ¨ If you are not taking a prescription pain medicine, ask your doctor if you can take an over-the-counter medicine. · Take short walks several times a day. You can start with 5 to 10 minutes, 3 or 4 times a day, and work up to longer walks. Walk on level surfaces and avoid hills and stairs until your back is better. · Return to work and other activities as soon as you can. Continued rest without activity is usually not good for your back. · To prevent future back pain, do exercises to stretch and strengthen your back and stomach. Learn how to use good posture, safe lifting techniques, and proper body mechanics. When should you call for help? Call your doctor now or seek immediate medical care if:  · You have new or worsening numbness in your legs. · You have new or worsening weakness in your legs. (This could make it hard to stand up.)  · You lose control of your bladder or bowels. Watch closely for changes in your health, and be sure to contact your doctor if:  · Your pain gets worse. · You are not getting better after 2 weeks. Where can you learn more? Go to http://angelica-vishnu.info/. Enter E111 in the search box to learn more about \"Back Pain: Care Instructions. \"  Current as of: May 23, 2016  Content Version: 11.1  © 1555-1304 [employer]. Care instructions adapted under license by [employer] (which disclaims liability or warranty for this information). If you have questions about a medical condition or this instruction, always ask your healthcare professional. [place] [address] any warranty or liability for your use of this information.

## 2017-01-31 NOTE — MR AVS SNAPSHOT
Visit Information Date & Time Provider Department Dept. Phone Encounter #  
 1/31/2017 10:20 AM Maya Nunez NP [facility] [phone] [id number] Follow-up Instructions Return in about 2 months (around 3/31/2017) for with Samson Barrera. Follow-up and Disposition History Your Appointments 2/9/2017  1:00 PM  
ESTABLISHED PATIENT with Raghav Graham MD  
[facility] ([facility]) Appt Note: elielysed ad  
 [address] 
[address] [place] [address]  
[address] [place] [address] Upcoming Health Maintenance Date Due DTaP/Tdap/Td series (1 - Tdap) 2/28/1951 ZOSTER VACCINE AGE 60> 2/28/1990 GLAUCOMA SCREENING Q2Y 2/28/1995 Pneumococcal 65+ High/Highest Risk (1 of 2 - PCV13) 2/28/1995 MEDICARE YEARLY EXAM 2/28/1995 INFLUENZA AGE 9 TO ADULT 8/1/2016 Allergies as of 1/31/2017  Review Complete On: 1/31/2017 By: Haleigh Womack LPN Severity Noted Reaction Type Reactions Sulfur Medium 04/10/2013   Side Effect Rash Cephalexin  08/31/2012    Swelling Colchicine  08/22/2012    Swelling Tramadol Low 01/21/2016    Nausea Only Current Immunizations  Never Reviewed No immunizations on file. Not reviewed this visit You Were Diagnosed With   
  
 Codes Comments Lumbar stenosis    -  Primary ICD-10-CM: M48.06 
ICD-9-CM: 724.02 Vitals BP Pulse Temp Resp Height(growth percentile) Weight(growth percentile)  
 137/60 80 98.1 °F (36.7 °C) (Oral) 18 5' 10\" (1.778 m) 174 lb 9.6 oz (79.2 kg) BMI Smoking Status 25.05 kg/m2 Former Smoker BMI and BSA Data Body Mass Index Body Surface Area 25.05 kg/m 2 1.98 m 2 Preferred Pharmacy Pharmacy Name Phone [id number] [place], [address] [address] [phone] Your Updated Medication List  
  
   
 This list is accurate as of: 1/31/17 11:01 AM.  Always use your most recent med list.  
  
  
  
  
 allopurinol 100 mg tablet Commonly known as:  Orpha Conquest Take 100 mg by mouth daily. amLODIPine 10 mg tablet Commonly known as:  [place] Mend Take 10 mg by mouth daily. aspirin 325 mg tablet Commonly known as:  ASPIRIN Take 325 mg by mouth daily. COMBIGAN 0.2-0.5 % Drop ophthalmic solution Generic drug:  brimonidine-timolol Administer 1 Drop to both eyes every twelve (12) hours. HYDROcodone-acetaminophen  mg tablet Commonly known as:  Benetta Pock Take 1 Tab by mouth daily as needed for Pain. Indications: PAIN  
  
 lactulose 10 gram/15 mL solution Commonly known as:  CHRONULAC  
  
 latanoprost 0.005 % ophthalmic solution Commonly known as:  [place] Blind Administer 1 Drop to both eyes nightly. leuprolide 45 mg injection Commonly known as:  LUPRON Injection given SQ  
  
 lisinopril 20 mg tablet Commonly known as:  Arlet Needy Take 20 mg by mouth. nortriptyline 10 mg capsule Commonly known as:  PAMELOR Take 1 capsule by mouth  nightly  
  
 oxybutynin chloride XL 10 mg CR tablet Commonly known as:  DITROPAN XL Take 1 Tab by mouth daily. Indications: BLADDER HYPERACTIVITY, INCREASED URINARY FREQUENCY  
  
 tiZANidine 2 mg tablet Commonly known as:  Eli Mule Take 1 Tab by mouth nightly as needed. triamcinolone acetonide 0.1 % topical cream  
Commonly known as:  KENALOG Apply  to affected area. Prescriptions Sent to Pharmacy Refills  
 tiZANidine (ZANAFLEX) 2 mg tablet 1 Sig: Take 1 Tab by mouth nightly as needed. Class: Normal  
 Pharmacy: [address] [place] [address] #: [phone] Route: Oral  
  
Follow-up Instructions Return in about 2 months (around 3/31/2017) for with [address] [place] [address]. Patient Instructions Back Pain: Care Instructions Your Care Instructions Back pain has many possible causes. It is often related to problems with muscles and ligaments of the back. It may also be related to problems with the nerves, discs, or bones of the back. Moving, lifting, standing, sitting, or sleeping in an awkward way can strain the back. Sometimes you don't notice the injury until later. Arthritis is another common cause of back pain. Although it may hurt a lot, back pain usually improves on its own within several weeks. Most people recover in 12 weeks or less. Using good home treatment and being careful not to stress your back can help you feel better sooner. Follow-up care is a key part of your treatment and safety. Be sure to make and go to all appointments, and call your doctor if you are having problems. Its also a good idea to know your test results and keep a list of the medicines you take. How can you care for yourself at home? · Sit or lie in positions that are most comfortable and reduce your pain. Try one of these positions when you lie down: ¨ Lie on your back with your knees bent and supported by large pillows. ¨ Lie on the floor with your legs on the seat of a sofa or chair. Larry Dys on your side with your knees and hips bent and a pillow between your legs. ¨ Lie on your stomach if it does not make pain worse. · Do not sit up in bed, and avoid soft couches and twisted positions. Bed rest can help relieve pain at first, but it delays healing. Avoid bed rest after the first day of back pain. · Change positions every 30 minutes. If you must sit for long periods of time, take breaks from sitting. Get up and walk around, or lie in a comfortable position. · Try using a heating pad on a low or medium setting for 15 to 20 minutes every 2 or 3 hours. Try a warm shower in place of one session with the heating pad. · You can also try an ice pack for 10 to 15 minutes every 2 to 3 hours. Put a thin cloth between the ice pack and your skin. · Take pain medicines exactly as directed. ¨ If the doctor gave you a prescription medicine for pain, take it as prescribed. ¨ If you are not taking a prescription pain medicine, ask your doctor if you can take an over-the-counter medicine. · Take short walks several times a day. You can start with 5 to 10 minutes, 3 or 4 times a day, and work up to longer walks. Walk on level surfaces and avoid hills and stairs until your back is better. · Return to work and other activities as soon as you can. Continued rest without activity is usually not good for your back. · To prevent future back pain, do exercises to stretch and strengthen your back and stomach. Learn how to use good posture, safe lifting techniques, and proper body mechanics. When should you call for help? Call your doctor now or seek immediate medical care if: 
· You have new or worsening numbness in your legs. · You have new or worsening weakness in your legs. (This could make it hard to stand up.) · You lose control of your bladder or bowels. Watch closely for changes in your health, and be sure to contact your doctor if: 
· Your pain gets worse. · You are not getting better after 2 weeks. Where can you learn more? Go to http://angelica-vishnu.info/. Enter I207 in the search box to learn more about \"Back Pain: Care Instructions. \" Current as of: May 23, 2016 Content Version: 11.1 © 1900-3417 Healthwise, Incorporated. Care instructions adapted under license by [employer] (which disclaims liability or warranty for this information). If you have questions about a medical condition or this instruction, always ask your healthcare professional. [place] [address] any warranty or liability for your use of this information. Back Pain: Care Instructions Your Care Instructions Back pain has many possible causes.  It is often related to problems with muscles and ligaments of the back. It may also be related to problems with the nerves, discs, or bones of the back. Moving, lifting, standing, sitting, or sleeping in an awkward way can strain the back. Sometimes you don't notice the injury until later. Arthritis is another common cause of back pain. Although it may hurt a lot, back pain usually improves on its own within several weeks. Most people recover in 12 weeks or less. Using good home treatment and being careful not to stress your back can help you feel better sooner. Follow-up care is a key part of your treatment and safety. Be sure to make and go to all appointments, and call your doctor if you are having problems. Its also a good idea to know your test results and keep a list of the medicines you take. How can you care for yourself at home? · Sit or lie in positions that are most comfortable and reduce your pain. Try one of these positions when you lie down: ¨ Lie on your back with your knees bent and supported by large pillows. ¨ Lie on the floor with your legs on the seat of a sofa or chair. Jacquie Lisy on your side with your knees and hips bent and a pillow between your legs. ¨ Lie on your stomach if it does not make pain worse. · Do not sit up in bed, and avoid soft couches and twisted positions. Bed rest can help relieve pain at first, but it delays healing. Avoid bed rest after the first day of back pain. · Change positions every 30 minutes. If you must sit for long periods of time, take breaks from sitting. Get up and walk around, or lie in a comfortable position. · Try using a heating pad on a low or medium setting for 15 to 20 minutes every 2 or 3 hours. Try a warm shower in place of one session with the heating pad. · You can also try an ice pack for 10 to 15 minutes every 2 to 3 hours. Put a thin cloth between the ice pack and your skin. · Take pain medicines exactly as directed. ¨ If the doctor gave you a prescription medicine for pain, take it as prescribed. ¨ If you are not taking a prescription pain medicine, ask your doctor if you can take an over-the-counter medicine. · Take short walks several times a day. You can start with 5 to 10 minutes, 3 or 4 times a day, and work up to longer walks. Walk on level surfaces and avoid hills and stairs until your back is better. · Return to work and other activities as soon as you can. Continued rest without activity is usually not good for your back. · To prevent future back pain, do exercises to stretch and strengthen your back and stomach. Learn how to use good posture, safe lifting techniques, and proper body mechanics. When should you call for help? Call your doctor now or seek immediate medical care if: 
· You have new or worsening numbness in your legs. · You have new or worsening weakness in your legs. (This could make it hard to stand up.) · You lose control of your bladder or bowels. Watch closely for changes in your health, and be sure to contact your doctor if: 
· Your pain gets worse. · You are not getting better after 2 weeks. Where can you learn more? Go to http://angelica-vishnu.info/. Enter M318 in the search box to learn more about \"Back Pain: Care Instructions. \" Current as of: May 23, 2016 Content Version: 11.1 © 3667-3367 [employer]. Care instructions adapted under license by [employer] (which disclaims liability or warranty for this information). If you have questions about a medical condition or this instruction, always ask your healthcare professional. [place] [address] any warranty or liability for your use of this information. Patient Instructions History Introducing [facility]!    
 Romayne Duster introduces [employer] patient portal. Now you can access parts of your medical record, email your doctor's office, and request medication refills online. 1. In your internet browser, go to [email or web address] 2. Click on the First Time User? Click Here link in the Sign In box. You will see the New Member Sign Up page. 3. Enter your [employer] Access Code exactly as it appears below. You will not need to use this code after youve completed the sign-up process. If you do not sign up before the expiration date, you must request a new code. · [employer] Access Code: [email or web address] Expires: 3/12/2017 10:57 AM 
 
4. Enter the last four digits of your Social Security Number (xxxx) and Date of Birth (mm/dd/yyyy) as indicated and click Submit. You will be taken to the next sign-up page. 5. Create a [employer] ID. This will be your [employer] login ID and cannot be changed, so think of one that is secure and easy to remember. 6. Create a [employer] password. You can change your password at any time. 7. Enter your Password Reset Question and Answer. This can be used at a later time if you forget your password. 8. Enter your e-mail address. You will receive e-mail notification when new information is available in [address] 9. Click Sign Up. You can now view and download portions of your medical record. 10. Click the Download Summary menu link to download a portable copy of your medical information. If you have questions, please visit the Frequently Asked Questions section of the [employer] website. Remember, [employer] is NOT to be used for urgent needs. For medical emergencies, dial 911. Now available from your iPhone and Android! Please provide this summary of care documentation to your next provider. Your primary care clinician is listed as Maryann Bolton. If you have any questions after today's visit, please call [phone].

## 2017-02-01 ENCOUNTER — APPOINTMENT (OUTPATIENT)
Dept: PHYSICAL THERAPY | Age: 82
End: 2017-02-01

## 2017-02-03 ENCOUNTER — APPOINTMENT (OUTPATIENT)
Dept: PHYSICAL THERAPY | Age: 82
End: 2017-02-03

## 2017-02-09 PROBLEM — N32.81 OAB (OVERACTIVE BLADDER): Status: ACTIVE | Noted: 2017-02-09

## 2017-02-14 ENCOUNTER — APPOINTMENT (OUTPATIENT)
Dept: GENERAL RADIOLOGY | Age: 82
End: 2017-02-14
Attending: EMERGENCY MEDICINE
Payer: MEDICARE

## 2017-02-14 ENCOUNTER — HOSPITAL ENCOUNTER (EMERGENCY)
Age: 82
Discharge: HOME | End: 2017-02-14
Attending: EMERGENCY MEDICINE | Admitting: EMERGENCY MEDICINE
Payer: MEDICARE

## 2017-02-14 VITALS
OXYGEN SATURATION: 98 % | RESPIRATION RATE: 22 BRPM | TEMPERATURE: 98.7 F | DIASTOLIC BLOOD PRESSURE: 74 MMHG | HEART RATE: 84 BPM | SYSTOLIC BLOOD PRESSURE: 158 MMHG

## 2017-02-14 DIAGNOSIS — N18.9 CRI (CHRONIC RENAL INSUFFICIENCY), UNSPECIFIED STAGE: ICD-10-CM

## 2017-02-14 DIAGNOSIS — J42 CHRONIC BRONCHITIS WITH ACUTE EXACERBATION (HCC): Primary | ICD-10-CM

## 2017-02-14 DIAGNOSIS — R06.00 DYSPNEA, UNSPECIFIED TYPE: ICD-10-CM

## 2017-02-14 DIAGNOSIS — J20.9 CHRONIC BRONCHITIS WITH ACUTE EXACERBATION (HCC): Primary | ICD-10-CM

## 2017-02-14 LAB
ALBUMIN SERPL BCP-MCNC: 3.8 G/DL (ref 3.4–5)
ALBUMIN/GLOB SERPL: 1.1 {RATIO} (ref 0.8–1.7)
ALP SERPL-CCNC: 32 U/L (ref 45–117)
ALT SERPL-CCNC: 17 U/L (ref 16–61)
ANION GAP BLD CALC-SCNC: 11 MMOL/L (ref 3–18)
AST SERPL W P-5'-P-CCNC: 19 U/L (ref 15–37)
ATRIAL RATE: 73 BPM
BASOPHILS # BLD AUTO: 0 K/UL (ref 0–0.06)
BASOPHILS # BLD: 1 % (ref 0–2)
BILIRUB SERPL-MCNC: 0.3 MG/DL (ref 0.2–1)
BNP SERPL-MCNC: 1397 PG/ML (ref 0–1800)
BUN SERPL-MCNC: 38 MG/DL (ref 7–18)
BUN/CREAT SERPL: 18 (ref 12–20)
CALCIUM SERPL-MCNC: 9.3 MG/DL (ref 8.5–10.1)
CALCULATED P AXIS, ECG09: 72 DEGREES
CALCULATED R AXIS, ECG10: 57 DEGREES
CALCULATED T AXIS, ECG11: 67 DEGREES
CHLORIDE SERPL-SCNC: 103 MMOL/L (ref 100–108)
CK MB CFR SERPL CALC: 0.9 % (ref 0–4)
CK MB SERPL-MCNC: 1 NG/ML (ref 0.5–3.6)
CK SERPL-CCNC: 114 U/L (ref 39–308)
CO2 SERPL-SCNC: 26 MMOL/L (ref 21–32)
CREAT SERPL-MCNC: 2.1 MG/DL (ref 0.6–1.3)
DIAGNOSIS, 93000: NORMAL
DIFFERENTIAL METHOD BLD: ABNORMAL
EOSINOPHIL # BLD: 0.1 K/UL (ref 0–0.4)
EOSINOPHIL NFR BLD: 2 % (ref 0–5)
ERYTHROCYTE [DISTWIDTH] IN BLOOD BY AUTOMATED COUNT: 13.9 % (ref 11.6–14.5)
GLOBULIN SER CALC-MCNC: 3.6 G/DL (ref 2–4)
GLUCOSE SERPL-MCNC: 92 MG/DL (ref 74–99)
HCT VFR BLD AUTO: 29.4 % (ref 36–48)
HGB BLD-MCNC: 9.3 G/DL (ref 13–16)
LYMPHOCYTES # BLD AUTO: 28 % (ref 21–52)
LYMPHOCYTES # BLD: 1.3 K/UL (ref 0.9–3.6)
MAGNESIUM SERPL-MCNC: 2.6 MG/DL (ref 1.8–2.4)
MCH RBC QN AUTO: 30.3 PG (ref 24–34)
MCHC RBC AUTO-ENTMCNC: 31.6 G/DL (ref 31–37)
MCV RBC AUTO: 95.8 FL (ref 74–97)
MONOCYTES # BLD: 0.8 K/UL (ref 0.05–1.2)
MONOCYTES NFR BLD AUTO: 16 % (ref 3–10)
NEUTS SEG # BLD: 2.5 K/UL (ref 1.8–8)
NEUTS SEG NFR BLD AUTO: 53 % (ref 40–73)
P-R INTERVAL, ECG05: 158 MS
PLATELET # BLD AUTO: 182 K/UL (ref 135–420)
PMV BLD AUTO: 8.6 FL (ref 9.2–11.8)
POTASSIUM SERPL-SCNC: 5.3 MMOL/L (ref 3.5–5.5)
PROT SERPL-MCNC: 7.4 G/DL (ref 6.4–8.2)
Q-T INTERVAL, ECG07: 376 MS
QRS DURATION, ECG06: 78 MS
QTC CALCULATION (BEZET), ECG08: 414 MS
RBC # BLD AUTO: 3.07 M/UL (ref 4.7–5.5)
SODIUM SERPL-SCNC: 140 MMOL/L (ref 136–145)
TROPONIN I SERPL-MCNC: 0.05 NG/ML (ref 0–0.04)
VENTRICULAR RATE, ECG03: 73 BPM
WBC # BLD AUTO: 4.7 K/UL (ref 4.6–13.2)

## 2017-02-14 PROCEDURE — 85025 COMPLETE CBC W/AUTO DIFF WBC: CPT | Performed by: EMERGENCY MEDICINE

## 2017-02-14 PROCEDURE — 93005 ELECTROCARDIOGRAM TRACING: CPT

## 2017-02-14 PROCEDURE — 94640 AIRWAY INHALATION TREATMENT: CPT

## 2017-02-14 PROCEDURE — 74011000250 HC RX REV CODE- 250: Performed by: EMERGENCY MEDICINE

## 2017-02-14 PROCEDURE — 82550 ASSAY OF CK (CPK): CPT | Performed by: EMERGENCY MEDICINE

## 2017-02-14 PROCEDURE — 74011250637 HC RX REV CODE- 250/637: Performed by: EMERGENCY MEDICINE

## 2017-02-14 PROCEDURE — 77030013140 HC MSK NEB VYRM -A

## 2017-02-14 PROCEDURE — 83880 ASSAY OF NATRIURETIC PEPTIDE: CPT | Performed by: EMERGENCY MEDICINE

## 2017-02-14 PROCEDURE — 83735 ASSAY OF MAGNESIUM: CPT | Performed by: EMERGENCY MEDICINE

## 2017-02-14 PROCEDURE — A9270 NON-COVERED ITEM OR SERVICE: HCPCS | Performed by: EMERGENCY MEDICINE

## 2017-02-14 PROCEDURE — 99284 EMERGENCY DEPT VISIT MOD MDM: CPT

## 2017-02-14 PROCEDURE — 74011636637 HC RX REV CODE- 636/637: Performed by: EMERGENCY MEDICINE

## 2017-02-14 PROCEDURE — 71020 XR CHEST PA LAT: CPT

## 2017-02-14 PROCEDURE — 80053 COMPREHEN METABOLIC PANEL: CPT | Performed by: EMERGENCY MEDICINE

## 2017-02-14 RX ORDER — ALBUTEROL SULFATE 90 UG/1
2 AEROSOL, METERED RESPIRATORY (INHALATION)
Qty: 1 INHALER | Refills: 0 | Status: SHIPPED | OUTPATIENT
Start: 2017-02-14

## 2017-02-14 RX ORDER — PREDNISONE 50 MG/1
50 TABLET ORAL DAILY
Qty: 3 TAB | Refills: 0 | Status: SHIPPED | OUTPATIENT
Start: 2017-02-14 | End: 2017-02-17

## 2017-02-14 RX ORDER — LEVOFLOXACIN 750 MG/1
750 TABLET ORAL
Status: COMPLETED | OUTPATIENT
Start: 2017-02-14 | End: 2017-02-14

## 2017-02-14 RX ORDER — IPRATROPIUM BROMIDE AND ALBUTEROL SULFATE 2.5; .5 MG/3ML; MG/3ML
3 SOLUTION RESPIRATORY (INHALATION)
Status: COMPLETED | OUTPATIENT
Start: 2017-02-14 | End: 2017-02-14

## 2017-02-14 RX ORDER — ALBUTEROL SULFATE 90 UG/1
2 AEROSOL, METERED RESPIRATORY (INHALATION)
Status: COMPLETED | OUTPATIENT
Start: 2017-02-14 | End: 2017-02-14

## 2017-02-14 RX ORDER — LEVOFLOXACIN 750 MG/1
750 TABLET ORAL EVERY OTHER DAY
Qty: 3 TAB | Refills: 0 | Status: SHIPPED | OUTPATIENT
Start: 2017-02-14 | End: 2017-02-20

## 2017-02-14 RX ORDER — PREDNISONE 20 MG/1
60 TABLET ORAL
Status: COMPLETED | OUTPATIENT
Start: 2017-02-14 | End: 2017-02-14

## 2017-02-14 RX ADMIN — LEVOFLOXACIN 750 MG: 750 TABLET, FILM COATED ORAL at 14:18

## 2017-02-14 RX ADMIN — IPRATROPIUM BROMIDE AND ALBUTEROL SULFATE 3 ML: .5; 3 SOLUTION RESPIRATORY (INHALATION) at 14:07

## 2017-02-14 RX ADMIN — PREDNISONE 60 MG: 20 TABLET ORAL at 14:08

## 2017-02-14 RX ADMIN — ALBUTEROL SULFATE 2 PUFF: 90 AEROSOL, METERED RESPIRATORY (INHALATION) at 15:35

## 2017-02-14 NOTE — ED NOTES
I have reviewed discharge instructions with the patient. The patient verbalized understanding. All discharge education and paperwork given including follow up care. No questions at this time.

## 2017-02-14 NOTE — ED TRIAGE NOTES
1 month of chest congestion. Denies nasal congestion. Denies chest pain, feels sore with cough, denies shortness of breath when laying. Pt states \"was given 5 tablets antibiotic for this about 2-3 months ago, but then states he wasn't seen for this \". Also having chest tight tightness.

## 2017-02-14 NOTE — ED NOTES
[facility] EMERGENCY [facility]PT      80 y.o. male with noted past medical history who presents to the emergency department with chest congestion and prior treatment with abx. Patient has prostate cancer with bone mets. I performed a brief evaluation, including history and physical, of the patient here in triage and I have determined that pt will need further treatment and evaluation from the main side ER physician. I have placed initial orders to help in expediting patients care. Lois Spencer M.D.

## 2017-02-14 NOTE — ED PROVIDER NOTES
HPI Comments: Pt with history of \"asthmatic bronchitis\" and HTN who presents to ED with complaint of bilateral lower chest \"congestion\" with cough that \"I just can't seem to get up\" for the past month. He notes that he had similar symptoms months ago and \"my doctor gave me some antibiotics that really seemed to help\". He denies any antibiotic use in the past several months. He does not smoke. He states he does not see a pulmonologist.  He does not smoke. He denies any fevers or chills, no sputum production, no chest pain, no abdominal pain, no nausea or vomiting, no diaphoresis. He denies any neck or back pain. No other acute symptoms or complaints were noted. Past Medical History:   Diagnosis Date    Arthritis     Asthma     Back pain     Edema     Glaucoma     Gout     History of phimosis 06/20/2008     S/p circumcision     Hypertension     Low back pain radiating to both legs     Prostate cancer ([facility] [address]      [id number], s/p RP f/b XRT in 1992 due to PSA recurrence >20 years ago    Renal mass     Sciatica     Spinal stenosis     Thromboembolus ([facility] [place] [address] 2014    Thyroid disease        Past Surgical History:   Procedure Laterality Date    Hx other surgical  10/92     [facility]-Laser surgery    Hx other surgical  8/92     Z_PR REMV prostate,retropub, radical    Hx knee replacement      Hx circumcision  06/20/2008         Family History:   Problem Relation Age of Onset    Arthritis-osteo Father     Asthma Other        Social History     Social History    Marital status:      Spouse name: N/A    Number of children: N/A    Years of education: N/A     Occupational History    Not on file.      Social History Main Topics    Smoking status: Former Smoker    Smokeless tobacco: Never Used      Comment: Quit in 1953    Alcohol use No    Drug use: No    Sexual activity: Not on file     Other Topics Concern    Not on file     Social History Narrative         ALLERGIES: Sulfur; Cephalexin; Colchicine; and Tramadol    Review of Systems   Constitutional: Negative for chills, diaphoresis and fever. HENT: Negative. Eyes: Negative for visual disturbance. Respiratory: Positive for cough and wheezing. Negative for shortness of breath. Cardiovascular: Negative for chest pain, palpitations and leg swelling. Gastrointestinal: Negative for abdominal pain, nausea and vomiting. Endocrine: Negative. Genitourinary: Negative for dysuria and hematuria. Musculoskeletal: Negative. Negative for back pain, neck pain and neck stiffness. Skin: Negative for pallor. Allergic/Immunologic: Negative. Neurological: Negative for dizziness, syncope, light-headedness and headaches. Hematological: Does not bruise/bleed easily. There were no vitals filed for this visit. Physical Exam   Constitutional: He is oriented to person, place, and time. He appears well-developed and well-nourished. No distress. Resting comfortably on stretcher. Speaking comfortably in full sentences. HENT:   Head: Normocephalic and atraumatic. MM moist   Eyes: Conjunctivae and EOM are normal. No scleral icterus. Sclera clear bilaterally   Neck: Normal range of motion. Neck supple. No JVD present. Non-tender to palpation   Cardiovascular: Normal rate, regular rhythm and normal heart sounds. Exam reveals no gallop and no friction rub. No murmur heard. Pulmonary/Chest: Effort normal. No respiratory distress. He has wheezes. He has no rales. He exhibits no tenderness. No crepitance with palpation. Bilateral expiratory wheezing. Abdominal: Soft. He exhibits no distension. There is no tenderness. Genitourinary:   Genitourinary Comments: No CVA tenderness   Musculoskeletal: He exhibits no edema or tenderness. Normal inspection of upper extremities. No edema noted to bilateral lower extremities   Lymphadenopathy:     He has no cervical adenopathy.    Neurological: He is alert and oriented to person, place, and time. No cranial nerve deficit. He exhibits normal muscle tone. Normal motor and sensation bilaterally to upper and lower extremities   Skin: Skin is warm and dry. No rash noted. He is not diaphoretic. Psychiatric:   Normal mood and affect. Vitals reviewed. MDM  Number of Diagnoses or Management Options  Diagnosis management comments: Pt with non-productive cough and wheezing for the past month with history of \"asthmatic bronchitis\". No acute distress noted, will check labs, EKG, CXR and treat symptoms. Reassess. Pt states he is feeling much better after treatment. He continues to deny any chest pain or pressure. He does not have MDI at home. Will give treatment and instruct in ED, anticipate d/c to home with Rx and PMD follow up. Pt and family in agreement with discharge plans. Amount and/or Complexity of Data Reviewed  Clinical lab tests: ordered and reviewed  Tests in the radiology section of CPT®: ordered and reviewed  Tests in the medicine section of CPT®: ordered and reviewed  Decide to obtain previous medical records or to obtain history from someone other than the patient: yes  Obtain history from someone other than the patient: yes  Independent visualization of images, tracings, or specimens: yes    Risk of Complications, Morbidity, and/or Mortality  Presenting problems: moderate  Management options: moderate    Patient Progress  Patient progress: improved    ED Course       Procedures    EKG:  NSR, rate 73, normal axis, no ST-T abnormalities. No significant changes noted from 8/22/12.

## 2017-02-14 NOTE — DISCHARGE INSTRUCTIONS
Learning About Chronic Bronchitis  What is chronic bronchitis? Chronic bronchitis is long-term swelling and the buildup of mucus in the airways of your lungs. The airways (bronchial tubes) get inflamed and make a lot of mucus. This can narrow or block the airways, making it hard for you to breathe. It is a form of COPD (chronic obstructive pulmonary disease). Chronic bronchitis is usually caused by smoking. But chemical fumes, dust, or air pollution also can cause it over time. What can you expect when you have chronic bronchitis? Chronic bronchitis gets worse over time. You cannot undo the damage to your lungs. Over time, you may find that:  · You get short of breath even when you do simple things like get dressed or fix a meal.  · It is hard to eat or exercise. · You lose weight and feel weaker. Over many years, the swelling and mucus from chronic bronchitis make it more likely that you will get lung infections. But there are things you can do to prevent more damage and feel better. What are the symptoms? The main symptoms of chronic bronchitis are:  · A cough that will not go away. · Mucus that comes up when you cough. · Shortness of breath that gets worse when you exercise. At times, your symptoms may suddenly flare up and get much worse. This is a called an exacerbation (say \"egg-ZACHANTAL-er-BAY-barb\"). When this happens, your usual symptoms quickly get worse and stay bad. This can be dangerous. You may have to go to the hospital.  How can you keep chronic bronchitis from getting worse? Don't smoke. That is the best way to keep chronic bronchitis from getting worse. If you already smoke, it is never too late to stop. If you need help quitting, talk to your doctor about stop-smoking programs and medicines. These can increase your chances of quitting for good. You can do other things to keep chronic bronchitis from getting worse:  · Avoid bad air.  Air pollution, chemical fumes, and dust also can make chronic bronchitis worse. · Get a flu shot every year. A shot may keep the flu from turning into something more serious, like pneumonia. A flu shot also may lower your chances of having a flare-up. · Get a pneumococcal shot. A shot can prevent some of the serious complications of pneumonia. Ask your doctor how often you should get this shot. How is chronic bronchitis treated? Chronic bronchitis is treated with medicines and oxygen. You also can take steps at home to stay healthy and keep your condition from getting worse. Medicines and oxygen therapy  · You may be taking medicines such as:  ¨ Bronchodilators. These help open your airways and make breathing easier. Bronchodilators are either short-acting (work for 6 to 9 hours) or long-acting (work for 24 hours). You inhale most bronchodilators, so they start to act quickly. Always carry your quick-relief inhaler with you in case you need it while you are away from home. ¨ Corticosteroids. These reduce airway inflammation. They come in pill or inhaled form. You must take these medicines every day for them to work well. ¨ Antibiotics. These medicines are used when you have a bacterial lung infection. · Take your medicines exactly as prescribed. Call your doctor if you think you are having a problem with your medicine. · Oxygen therapy boosts the amount of oxygen in your blood and helps you breathe easier. Use the flow rate your doctor has recommended, and do not change it without talking to your doctor first.  Other care at home  · If your doctor recommends it, get more exercise. Walking is a good choice. Bit by bit, increase the amount you walk every day. Try for at least 30 minutes on most days of the week. · Learn breathing methods--such as breathing through pursed lips--to help you become less short of breath. · If your doctor has not set you up with a pulmonary rehabilitation program, talk to him or her about whether rehab is right for you.  Rehab includes exercise programs, education about your disease and how to manage it, help with diet and other changes, and emotional support. · Eat regular, healthy meals. Use bronchodilators about 1 hour before you eat to make it easier to eat. Eat several small meals instead of three large ones. Drink beverages at the end of the meal. Avoid foods that are hard to chew. Follow-up care is a key part of your treatment and safety. Be sure to make and go to all appointments, and call your doctor if you are having problems. It's also a good idea to know your test results and keep a list of the medicines you take. Where can you learn more? Go to http://[employer] Groupvishnu.info/. Enter Q134 in the search box to learn more about \"Learning About Chronic Bronchitis. \"  Current as of: May 23, 2016  Content Version: 11.1  © 1032-6275 [employer] Care instructions adapted under license by [employer] (which disclaims liability or warranty for this information). If you have questions about a medical condition or this instruction, always ask your healthcare professional. [address] any warranty or liability for your use of this information. Chronic Obstructive Pulmonary Disease (COPD): Care Instructions  Your Care Instructions    Chronic obstructive pulmonary disease (COPD) is a general term for a group of lung diseases, including emphysema and chronic bronchitis. People with COPD have decreased airflow in and out of the lungs, which makes it hard to breathe. The airways also can get clogged with thick mucus. Cigarette smoking is a major cause of COPD. Although there is no cure for COPD, you can slow its progress. Following your treatment plan and taking care of yourself can help you feel better and live longer. Follow-up care is a key part of your treatment and safety. Be sure to make and go to all appointments, and call your doctor if you are having problems. It's also a good idea to know your test results and keep a list of the medicines you take. How can you care for yourself at home? Staying healthy  · Do not smoke. This is the most important step you can take to prevent more damage to your lungs. If you need help quitting, talk to your doctor about stop-smoking programs and medicines. These can increase your chances of quitting for good. · Avoid colds and flu. Get a pneumococcal vaccine shot. If you have had one before, ask your doctor whether you need a second dose. Get the flu vaccine every fall. If you must be around people with colds or the flu, wash your hands often. · Avoid secondhand smoke, air pollution, and high altitudes. Also avoid cold, dry air and hot, humid air. Stay at home with your windows closed when air pollution is bad. Medicines and oxygen therapy  · Take your medicines exactly as prescribed. Call your doctor if you think you are having a problem with your medicine. · You may be taking medicines such as:  ¨ Bronchodilators. These help open your airways and make breathing easier. Bronchodilators are either short-acting (work for 6 to 9 hours) or long-acting (work for 24 hours). You inhale most bronchodilators, so they start to act quickly. Always carry your quick-relief inhaler with you in case you need it while you are away from home. ¨ Corticosteroids (prednisone, budesonide). These reduce airway inflammation. They come in pill or inhaled form. You must take these medicines every day for them to work well. · A spacer may help you get more inhaled medicine to your lungs. Ask your doctor or pharmacist if a spacer is right for you. If it is, ask how to use it properly. · Do not take any vitamins, over-the-counter medicine, or herbal products without talking to your doctor first.  · If your doctor prescribed antibiotics, take them as directed. Do not stop taking them just because you feel better.  You need to take the full course of antibiotics. · Oxygen therapy boosts the amount of oxygen in your blood and helps you breathe easier. Use the flow rate your doctor has recommended, and do not change it without talking to your doctor first.  Activity  · Get regular exercise. Walking is an easy way to get exercise. Start out slowly, and walk a little more each day. · Pay attention to your breathing. You are exercising too hard if you cannot talk while you are exercising. · Take short rest breaks when doing household chores and other activities. · Learn breathing methods--such as breathing through pursed lips--to help you become less short of breath. · If your doctor has not set you up with a pulmonary rehabilitation program, talk to him or her about whether rehab is right for you. Rehab includes exercise programs, education about your disease and how to manage it, help with diet and other changes, and emotional support. Diet  · Eat regular, healthy meals. Use bronchodilators about 1 hour before you eat to make it easier to eat. Eat several small meals instead of three large ones. Drink beverages at the end of the meal. Avoid foods that are hard to chew. · Eat foods that contain protein so that you do not lose muscle mass. Mental health  · Talk to your family, friends, or a therapist about your feelings. It is normal to feel frightened, angry, hopeless, helpless, and even guilty. Talking openly about bad feelings can help you cope. If these feelings last, talk to your doctor. When should you call for help? Call 911 anytime you think you may need emergency care. For example, call if:  · You have severe trouble breathing. Call your doctor now or seek immediate medical care if:  · You have new or worse trouble breathing. · You cough up blood. · You have a fever.   Watch closely for changes in your health, and be sure to contact your doctor if:  · You cough more deeply or more often, especially if you notice more mucus or a change in the color of your mucus. · You have new or worse swelling in your legs or belly. · You are not getting better as expected. Where can you learn more? Go to http://angelica-vishnu.info/. Tamia Bradford in the search box to learn more about \"Chronic Obstructive Pulmonary Disease (COPD): Care Instructions. \"  Current as of: May 23, 2016  Content Version: 11.1  © 3229-9970 [employer]. Care instructions adapted under license by [employer] (which disclaims liability or warranty for this information). If you have questions about a medical condition or this instruction, always ask your healthcare professional. [place] [address] any warranty or liability for your use of this information. Shortness of Breath: Care Instructions  Your Care Instructions  Shortness of breath has many causes. Sometimes conditions such as anxiety can lead to shortness of breath. Some people get mild shortness of breath when they exercise. Trouble breathing also can be a symptom of a serious problem, such as asthma, lung disease, emphysema, heart problems, and pneumonia. If your shortness of breath continues, you may need tests and treatment. Watch for any changes in your breathing and other symptoms. Follow-up care is a key part of your treatment and safety. Be sure to make and go to all appointments, and call your doctor if you are having problems. Its also a good idea to know your test results and keep a list of the medicines you take. How can you care for yourself at home? · Do not smoke or allow others to smoke around you. If you need help quitting, talk to your doctor about stop-smoking programs and medicines. These can increase your chances of quitting for good. · Get plenty of rest and sleep. · Take your medicines exactly as prescribed. Call your doctor if you think you are having a problem with your medicine. · Find healthy ways to deal with stress.   ¨ Exercise daily.  ¨ Get plenty of sleep. ¨ Eat regularly and well. When should you call for help? Call 911 anytime you think you may need emergency care. For example, call if:  · You have severe shortness of breath. · You have symptoms of a heart attack. These may include:  ¨ Chest pain or pressure, or a strange feeling in the chest.  ¨ Sweating. ¨ Shortness of breath. ¨ Nausea or vomiting. ¨ Pain, pressure, or a strange feeling in the back, neck, jaw, or upper belly or in one or both shoulders or arms. ¨ Lightheadedness or sudden weakness. ¨ A fast or irregular heartbeat. After you call 911, the  may tell you to chew 1 adult-strength or 2 to 4 low-dose aspirin. Wait for an ambulance. Do not try to drive yourself. Call your doctor now or seek immediate medical care if:  · Your shortness of breath gets worse or you start to wheeze. Wheezing is a high-pitched sound when you breathe. · You wake up at night out of breath or have to prop your head up on several pillows to breathe. · You are short of breath after only light activity or while at rest.  Watch closely for changes in your health, and be sure to contact your doctor if:  · You do not get better over the next 1 to 2 days. Where can you learn more? Go to http://angelica-vishnu.info/. Enter S780 in the search box to learn more about \"Shortness of Breath: Care Instructions. \"  Current as of: May 23, 2016  Content Version: 11.1  © 9250-4525 [employer]. Care instructions adapted under license by [employer] (which disclaims liability or warranty for this information). If you have questions about a medical condition or this instruction, always ask your healthcare professional. [address] any warranty or liability for your use of this information.

## 2017-02-17 ENCOUNTER — HOSPITAL ENCOUNTER (OUTPATIENT)
Dept: NUCLEAR MEDICINE | Age: 82
Discharge: HOME | End: 2017-02-17
Attending: UROLOGY
Payer: MEDICARE

## 2017-02-17 ENCOUNTER — HOSPITAL ENCOUNTER (OUTPATIENT)
Dept: GENERAL RADIOLOGY | Age: 82
Discharge: HOME | End: 2017-02-17
Attending: UROLOGY
Payer: MEDICARE

## 2017-02-17 DIAGNOSIS — C61 PROSTATE CANCER (HCC): ICD-10-CM

## 2017-02-17 DIAGNOSIS — C79.51 BONE METASTASES (HCC): ICD-10-CM

## 2017-02-17 PROCEDURE — 73030 X-RAY EXAM OF SHOULDER: CPT

## 2017-02-17 PROCEDURE — 78306 BONE IMAGING WHOLE BODY: CPT

## 2017-03-03 ENCOUNTER — HOSPITAL ENCOUNTER (EMERGENCY)
Age: 82
Discharge: HOME | End: 2017-03-03
Attending: EMERGENCY MEDICINE
Payer: MEDICARE

## 2017-03-03 ENCOUNTER — APPOINTMENT (OUTPATIENT)
Dept: GENERAL RADIOLOGY | Age: 82
End: 2017-03-03
Attending: PHYSICIAN ASSISTANT
Payer: MEDICARE

## 2017-03-03 VITALS
SYSTOLIC BLOOD PRESSURE: 157 MMHG | BODY MASS INDEX: 26.37 KG/M2 | OXYGEN SATURATION: 96 % | TEMPERATURE: 98.3 F | HEART RATE: 96 BPM | HEIGHT: 68 IN | WEIGHT: 174 LBS | DIASTOLIC BLOOD PRESSURE: 88 MMHG | RESPIRATION RATE: 16 BRPM

## 2017-03-03 DIAGNOSIS — N18.4 CHRONIC RENAL DISEASE, STAGE 4 (SEVERE): ICD-10-CM

## 2017-03-03 DIAGNOSIS — R55 SYNCOPE AND COLLAPSE: Primary | ICD-10-CM

## 2017-03-03 DIAGNOSIS — S40.012A CONTUSION OF LEFT SHOULDER, INITIAL ENCOUNTER: ICD-10-CM

## 2017-03-03 LAB
ANION GAP BLD CALC-SCNC: 14 MMOL/L (ref 10–20)
ANION GAP BLD CALC-SCNC: 14 MMOL/L (ref 10–20)
BUN BLD-MCNC: 67 MG/DL (ref 7–18)
BUN BLD-MCNC: 73 MG/DL (ref 7–18)
CA-I BLD-MCNC: 1.59 MMOL/L (ref 1.12–1.32)
CA-I BLD-MCNC: 1.65 MMOL/L (ref 1.12–1.32)
CHLORIDE BLD-SCNC: 101 MMOL/L (ref 100–108)
CHLORIDE BLD-SCNC: 104 MMOL/L (ref 100–108)
CK MB CFR SERPL CALC: 1.5 % (ref 0–4)
CK MB SERPL-MCNC: 1.8 NG/ML (ref 5–25)
CK SERPL-CCNC: 123 U/L (ref 39–308)
CO2 BLD-SCNC: 24 MMOL/L (ref 19–24)
CO2 BLD-SCNC: 27 MMOL/L (ref 19–24)
CREAT UR-MCNC: 3.1 MG/DL (ref 0.6–1.3)
CREAT UR-MCNC: 3.4 MG/DL (ref 0.6–1.3)
GLUCOSE BLD STRIP.AUTO-MCNC: 77 MG/DL (ref 74–106)
GLUCOSE BLD STRIP.AUTO-MCNC: 90 MG/DL (ref 74–106)
HCT VFR BLD CALC: 30 % (ref 36–49)
HCT VFR BLD CALC: 35 % (ref 36–49)
HGB BLD-MCNC: 10.2 G/DL (ref 12–16)
HGB BLD-MCNC: 11.9 G/DL (ref 12–16)
POTASSIUM BLD-SCNC: 5 MMOL/L (ref 3.5–5.5)
POTASSIUM BLD-SCNC: 5.6 MMOL/L (ref 3.5–5.5)
SODIUM BLD-SCNC: 135 MMOL/L (ref 136–145)
SODIUM BLD-SCNC: 136 MMOL/L (ref 136–145)
TROPONIN I SERPL-MCNC: 0.04 NG/ML (ref 0–0.04)

## 2017-03-03 PROCEDURE — 99284 EMERGENCY DEPT VISIT MOD MDM: CPT

## 2017-03-03 PROCEDURE — 73030 X-RAY EXAM OF SHOULDER: CPT

## 2017-03-03 PROCEDURE — 74011250636 HC RX REV CODE- 250/636: Performed by: PHYSICIAN ASSISTANT

## 2017-03-03 PROCEDURE — 82550 ASSAY OF CK (CPK): CPT | Performed by: PHYSICIAN ASSISTANT

## 2017-03-03 PROCEDURE — 80047 BASIC METABLC PNL IONIZED CA: CPT

## 2017-03-03 PROCEDURE — 96361 HYDRATE IV INFUSION ADD-ON: CPT

## 2017-03-03 PROCEDURE — 74011250637 HC RX REV CODE- 250/637: Performed by: PHYSICIAN ASSISTANT

## 2017-03-03 PROCEDURE — 96360 HYDRATION IV INFUSION INIT: CPT

## 2017-03-03 PROCEDURE — 93005 ELECTROCARDIOGRAM TRACING: CPT

## 2017-03-03 RX ORDER — SENNOSIDES 8.6 MG/1
1 TABLET ORAL DAILY
Qty: 14 TAB | Refills: 0 | Status: SHIPPED | OUTPATIENT
Start: 2017-03-03

## 2017-03-03 RX ORDER — SODIUM CHLORIDE 9 MG/ML
1000 INJECTION, SOLUTION INTRAVENOUS ONCE
Status: COMPLETED | OUTPATIENT
Start: 2017-03-03 | End: 2017-03-03

## 2017-03-03 RX ORDER — SODIUM CHLORIDE 9 MG/ML
500 INJECTION, SOLUTION INTRAVENOUS ONCE
Status: DISCONTINUED | OUTPATIENT
Start: 2017-03-03 | End: 2017-03-03

## 2017-03-03 RX ORDER — ACETAMINOPHEN 325 MG/1
975 TABLET ORAL
Status: COMPLETED | OUTPATIENT
Start: 2017-03-03 | End: 2017-03-03

## 2017-03-03 RX ORDER — SODIUM CHLORIDE 9 MG/ML
500 INJECTION, SOLUTION INTRAVENOUS ONCE
Status: COMPLETED | OUTPATIENT
Start: 2017-03-03 | End: 2017-03-03

## 2017-03-03 RX ADMIN — SODIUM CHLORIDE 500 ML: 900 INJECTION, SOLUTION INTRAVENOUS at 10:51

## 2017-03-03 RX ADMIN — SODIUM CHLORIDE 1000 ML: 900 INJECTION, SOLUTION INTRAVENOUS at 12:31

## 2017-03-03 RX ADMIN — ACETAMINOPHEN 975 MG: 325 TABLET ORAL at 12:16

## 2017-03-03 NOTE — DISCHARGE INSTRUCTIONS
CONTINUE TO TAKE YOUR REGULAR MEDICATIONS INCLUDING PAIN MEDICATION. FOLLOW UP WITH DR. Thu Galvin AND KIDNEY SPECIALIST NEXT WEEK FOR RECHECK AND FURTHER DISCUSSIONS.

## 2017-03-03 NOTE — ED PROVIDER NOTES
HPI Comments: 9:33 AM Tammy Swift is a 80 y.o. male who presents to the ED c/o chest congestion over the past three weeks. The pt notes associated productive cough with some yellow sputum. He reports that he was seen in the ED 3 weeks ago and was given 3 prescriptions which he took without relief of his symptoms, he was then seen by his doctor a little over a week ago and was prescribed a 6 day taper of Prednisone with another antibiotic which he finished, but his cough and chest congestion have persisted. He has also been using an albuterol inhaler, but he has not taken any Mucinex. The pt also reports that his last normal BM was ~6 days ago, his daughter reports that he did have diarrhea throughout the day 5 days ago, but he has had no BMs since then. 4 days ago, the pt also reports that he had a syncopal episode after standing up from bed to go open the door, he woke up on the floor with bilateral upper back pain that is worse on the left. That pain has persisted since then. He states that he has had a decreased appetite and dry mouth for the past week and he is concerned that he is dehydrated. He last ate a little bit of broccoli and sweet potato last night and he last drank water last night with his night time medications. Admits that since chest cold appetite has not been the same. He last urinated 1-2 hours ago. He denies chest pain, abdominal pain, N/V, HA, fever, weakness, numbness, and any further complaints. The history is provided by the patient and a relative.         Past Medical History:   Diagnosis Date    Arthritis     Asthma     Back pain     Edema     Glaucoma     Gout     History of phimosis 06/20/2008    S/p circumcision     Hypertension     Low back pain radiating to both legs     Prostate cancer ([facility] [address]     [id number], s/p RP f/b XRT in 1992 due to PSA recurrence >20 years ago    Renal mass     Sciatica     Spinal stenosis     Thromboembolus ([address] 2014    Thyroid disease Past Surgical History:   Procedure Laterality Date    HX CIRCUMCISION  06/20/2008    HX KNEE REPLACEMENT      HX OTHER SURGICAL  10/92    [facility]-Laser surgery    HX OTHER SURGICAL  8/92    Z_PR REMV prostate,retropub, radical         Family History:   Problem Relation Age of Onset    Arthritis-osteo Father     Asthma Other        Social History     Social History    Marital status:      Spouse name: N/A    Number of children: N/A    Years of education: N/A     Occupational History    Not on file. Social History Main Topics    Smoking status: Former Smoker    Smokeless tobacco: Never Used      Comment: Quit in [address] Alcohol use No    Drug use: No    Sexual activity: Not on file     Other Topics Concern    Not on file     Social History Narrative         ALLERGIES: Sulfur; Cephalexin; Colchicine; and Tramadol    Review of Systems   Constitutional: Positive for appetite change and fatigue. Negative for activity change, chills and fever. HENT: Positive for congestion (chest congestion). Negative for rhinorrhea, sore throat, trouble swallowing and voice change. Eyes: Negative. Negative for visual disturbance. Respiratory: Positive for cough. Negative for chest tightness, shortness of breath and wheezing. Cardiovascular: Negative for chest pain and leg swelling. Gastrointestinal: Positive for constipation and diarrhea. Negative for abdominal pain and nausea. Genitourinary: Negative for difficulty urinating, dysuria, flank pain, hematuria, penile pain and penile swelling. Musculoskeletal: Positive for back pain (upper back). Negative for arthralgias and myalgias. Skin: Negative for rash and wound. Neurological: Positive for syncope. Negative for weakness, light-headedness, numbness and headaches. Psychiatric/Behavioral: Negative for confusion and hallucinations. All other systems reviewed and are negative.       Vitals:    03/03/17 0917   BP: 129/76   Pulse: 89 Resp: 18   Temp: 99 °F (37.2 °C)   SpO2: 96%   Weight: 78.9 kg (174 lb)   Height: 5' 8\" (1.727 m)            Physical Exam   Constitutional: He is oriented to person, place, and time. He appears well-developed. No distress. HENT:   Head: Normocephalic and atraumatic. Right Ear: External ear normal.   Left Ear: External ear normal.   Mouth/Throat: Oropharynx is clear and moist.   Eyes: EOM are normal. Pupils are equal, round, and reactive to light. Right conjunctiva is injected. Left conjunctiva is injected. Neck: Normal range of motion. Neck supple. Cardiovascular: Normal rate, regular rhythm and normal heart sounds. No murmur heard. Pulmonary/Chest: Effort normal and breath sounds normal. He has no wheezes. He has no rales. Abdominal: Soft. Bowel sounds are normal. He exhibits no distension and no mass. There is no tenderness. There is no rebound and no guarding. Genitourinary: Rectal exam shows anal tone abnormal. Rectal exam shows no mass, no tenderness and guaiac negative stool. Genitourinary Comments: Lax anal sphincter tone, sensation intact. Musculoskeletal: Normal range of motion. He exhibits no edema or deformity. Right shoulder: He exhibits tenderness. He exhibits normal range of motion, no bony tenderness, no swelling, no crepitus and no deformity. Arms:  Lymphadenopathy:     He has no cervical adenopathy. Neurological: He is alert and oriented to person, place, and time. No cranial nerve deficit. Skin: Skin is warm and dry. He is not diaphoretic. Psychiatric: He has a normal mood and affect. Nursing note and vitals reviewed. MDM  Number of Diagnoses or Management Options  Chronic renal disease, stage 4 (severe) ([facility] [address]):   Contusion of left shoulder, initial encounter:   Syncope and collapse:   Diagnosis management comments: 79yo male with reproducible left upper back pain after syncope and collapse 4 days ago, injuring left upper back.   Patient admits to slight decreased diet since URI symptoms which started about 3 weeks ago. Focal area of tenderness to palpation to left upper posterior shoulder. Normal neck and left shoulder ROM. Do not feel xray warranted however because of the number of complaints verbalized about left upper back/shoulder pain will xray to rule out fx or other acute abnormality. Left shoulder xray was negative. Patient also complaining of stomach rumbling and making noises attributing this to constipation. Patient denies abdominal or rectal pain, states last BM was 4 days ago. Abdomen was soft and non tender. Patient takes Norco for pain. Labs reassuring other than BUN/creatinine. BUN was 73 and creatinine 3.4, after 1.5L NS BUN 67 and creatinine 3.1, relatively unchanged. Hemoccult was negative. Creatinine was 2.0 one year ago. PCP and nephrology referrals. ED Course     EKG- NSR, RATE 93, NORMAL AXIS. NO STEMI  Procedures  SCRIBE ATTESTATION STATEMENT  Documented by: Blair Barajas scribing for, and in the presence of, Kwame Lozano PA-C 9:49 AM     Signed by: Richard Celis, 03/03/17 9:49 AM    PROVIDER ATTESTATION STATEMENT  I personally performed the services described in the documentation, reviewed the documentation, as recorded by the scribe in my presence, and it accurately and completely records my words and actions.   Kwame Lozano PA-C

## 2017-03-03 NOTE — ED TRIAGE NOTES
Pt6, brought by  Medic, diverted to triage  Wheelchair,  States  He was having   On & off dizziness before  His  Fall Monday ,, cc/o left shoulder pain, on  & off  Headache ,dizziness

## 2017-03-04 LAB
ATRIAL RATE: 93 BPM
CALCULATED P AXIS, ECG09: 71 DEGREES
CALCULATED R AXIS, ECG10: 63 DEGREES
CALCULATED T AXIS, ECG11: 67 DEGREES
DIAGNOSIS, 93000: NORMAL
P-R INTERVAL, ECG05: 162 MS
Q-T INTERVAL, ECG07: 342 MS
QRS DURATION, ECG06: 88 MS
QTC CALCULATION (BEZET), ECG08: 425 MS
VENTRICULAR RATE, ECG03: 93 BPM

## 2017-05-09 ENCOUNTER — OFFICE VISIT (OUTPATIENT)
Dept: ORTHOPEDIC SURGERY | Age: 82
End: 2017-05-09

## 2017-05-09 VITALS
TEMPERATURE: 98.1 F | BODY MASS INDEX: 27.52 KG/M2 | HEART RATE: 76 BPM | HEIGHT: 68 IN | SYSTOLIC BLOOD PRESSURE: 165 MMHG | DIASTOLIC BLOOD PRESSURE: 69 MMHG | RESPIRATION RATE: 18 BRPM | WEIGHT: 181.6 LBS | OXYGEN SATURATION: 98 %

## 2017-05-09 DIAGNOSIS — M54.42 CHRONIC BILATERAL LOW BACK PAIN WITH LEFT-SIDED SCIATICA: ICD-10-CM

## 2017-05-09 DIAGNOSIS — G89.29 CHRONIC BILATERAL LOW BACK PAIN WITH LEFT-SIDED SCIATICA: ICD-10-CM

## 2017-05-09 DIAGNOSIS — M48.061 LUMBAR STENOSIS: Primary | Chronic | ICD-10-CM

## 2017-05-09 RX ORDER — DOCUSATE SODIUM 100 MG/1
100 CAPSULE, LIQUID FILLED ORAL 2 TIMES DAILY
COMMUNITY
End: 2017-05-15

## 2017-05-09 RX ORDER — HYDROCODONE BITARTRATE AND ACETAMINOPHEN 10; 325 MG/1; MG/1
1 TABLET ORAL
Qty: 30 TAB | Refills: 0 | Status: SHIPPED | OUTPATIENT
Start: 2017-05-09 | End: 2017-01-01 | Stop reason: SDUPTHER

## 2017-05-09 RX ORDER — KETOROLAC TROMETHAMINE 15 MG/ML
30 INJECTION, SOLUTION INTRAMUSCULAR; INTRAVENOUS ONCE
Qty: 1 VIAL | Refills: 0
Start: 2017-05-09 | End: 2017-05-09

## 2017-05-09 NOTE — PROGRESS NOTES
Chief complaint/History of Present Illness:  Chief Complaint   Patient presents with    Back Pain     follow up     HPI  [address]. is a  80 y.o.  male      HISTORY OF PRESENT ILLNESS:  The patient comes in today stating that he had an L4-5 epidural on January 3, 2017, which did help quite a bit and his right leg pain resolved. He is stating he is now having some left buttock pain that goes posteriorly to his mid-thigh. For the past week, he is not sure what he did to flare it up. However, it is hurting him quite a bit. Normally, he only takes Norco 10/325 mg rarely, but he is having to take it twice a day now. He is rating his pain as a 9/10. He denies fever or bowel or bladder dysfunction. He takes Pamelor 10 mg at night. He also takes Tylenol for pain. He is no longer taking the Zanaflex. PHYSICAL EXAM:  Mr. Monica Blake is an 80-year-old male. He is alert and oriented. He has a full weight bearing, non-antalgic gait. He has 4/5 strength of the bilateral lower extremities and negative straight leg raise. ASSESSENT/PLAN:  This is a patient who has known spinal stenosis. He is having a flare of pain. We will give him Toradol 30 mg IM today. I refilled his Norco.  He has a consistent UDS. His  is appropriate. He will continue his Pamelor. We will see him back in three months or sooner if needed. If his pain does not dissipate, he will come back in and we will reevaluate him.         Review of systems:    Past Medical History:   Diagnosis Date    Arthritis     Asthma     Back pain     Edema     Glaucoma     Gout     History of phimosis 06/20/2008    S/p circumcision     Hypertension     Low back pain radiating to both legs     Prostate cancer ([facility] [address]     [id number], s/p RP f/b XRT in 1992 due to PSA recurrence >20 years ago    Renal mass     Sciatica     Spinal stenosis     Thromboembolus ([address] 2014    Thyroid disease      Past Surgical History:   Procedure Laterality Date  HX CIRCUMCISION  06/20/2008    HX KNEE REPLACEMENT      HX OTHER SURGICAL  10/92    [facility]-Laser surgery    HX OTHER SURGICAL  8/92    Z_PR REMV prostate,retropub, radical     Social History     Social History    Marital status: SINGLE     Spouse name: N/A    Number of children: N/A    Years of education: N/A     Occupational History    Not on file. Social History Main Topics    Smoking status: Former Smoker    Smokeless tobacco: Never Used      Comment: Quit in 1953    Alcohol use No    Drug use: No    Sexual activity: Not on file     Other Topics Concern    Not on file     Social History Narrative     Family History   Problem Relation Age of Onset    Arthritis-osteo Father     Asthma Other        Physical Exam:  Visit Vitals    /69    Pulse 76    Temp 98.1 °F (36.7 °C) (Oral)    Resp 18    Ht 5' 8\" (1.727 m)    Wt 181 lb 9.6 oz (82.4 kg)    SpO2 98%    BMI 27.61 kg/m2     Pain Scale: 9/10     has been . reviewed and is appropriate    Pt has a consistent UDS in the record      Leah was seen today for back pain. Diagnoses and all orders for this visit:    Lumbar stenosis  -     KETOROLAC TROMETHAMINE INJ  -     ketorolac (TORADOL) 15 mg/mL soln injection; 2 mL by IntraMUSCular route once for 1 dose. -     THER/PROPH/DIAG INJECTION, SUBCUT/IM    Chronic bilateral low back pain with left-sided sciatica  -     KETOROLAC TROMETHAMINE INJ  -     ketorolac (TORADOL) 15 mg/mL soln injection; 2 mL by IntraMUSCular route once for 1 dose. -     THER/PROPH/DIAG INJECTION, SUBCUT/IM    Other orders  -     HYDROcodone-acetaminophen (NORCO)  mg tablet; Take 1 Tab by mouth two (2) times daily as needed for Pain. Max Daily Amount: 2 Tabs. Indications: Pain            Follow-up Disposition:  Return in about 3 months (around 8/9/2017) for with Dr Vy Mcclain.         We have informed [address] to notify us for immediate appointment if he has any worsening neurogical symptoms or if an emergency situation presents, then call [phone]

## 2017-05-12 ENCOUNTER — TELEPHONE (OUTPATIENT)
Dept: ORTHOPEDIC SURGERY | Age: 82
End: 2017-05-12

## 2017-05-12 NOTE — TELEPHONE ENCOUNTER
Patient called to advise that his pain has returned since getting inj in rt leg 5/9.   He's requesting a call back to discuss at [phone], upcoming appointment for 8/7 with Dr. Erin Kline

## 2017-05-15 ENCOUNTER — OFFICE VISIT (OUTPATIENT)
Dept: ORTHOPEDIC SURGERY | Age: 82
End: 2017-05-15

## 2017-05-15 VITALS
RESPIRATION RATE: 18 BRPM | BODY MASS INDEX: 26.83 KG/M2 | HEIGHT: 68 IN | DIASTOLIC BLOOD PRESSURE: 50 MMHG | SYSTOLIC BLOOD PRESSURE: 136 MMHG | TEMPERATURE: 97.2 F | WEIGHT: 177 LBS | HEART RATE: 82 BPM

## 2017-05-15 DIAGNOSIS — M48.061 LUMBAR STENOSIS: Primary | Chronic | ICD-10-CM

## 2017-05-15 RX ORDER — METHYLPREDNISOLONE 4 MG/1
TABLET ORAL
Refills: 0 | COMMUNITY
Start: 2017-02-21 | End: 2017-05-15

## 2017-05-15 RX ORDER — FUROSEMIDE 20 MG/1
TABLET ORAL
Refills: 1 | COMMUNITY
Start: 2017-04-27

## 2017-05-15 RX ORDER — MIRTAZAPINE 15 MG/1
TABLET, FILM COATED ORAL
Refills: 1 | COMMUNITY
Start: 2017-04-22

## 2017-05-15 RX ORDER — TIZANIDINE 2 MG/1
TABLET ORAL
Refills: 1 | COMMUNITY
Start: 2017-02-27 | End: 2017-05-15

## 2017-05-15 RX ORDER — LEVOFLOXACIN 500 MG/1
TABLET, FILM COATED ORAL
Refills: 0 | COMMUNITY
Start: 2017-02-21 | End: 2017-05-15

## 2017-05-15 RX ORDER — FLUTICASONE PROPIONATE 50 MCG
SPRAY, SUSPENSION (ML) NASAL
Refills: 1 | Status: ON HOLD | COMMUNITY
Start: 2017-03-27 | End: 2018-01-01

## 2017-05-15 NOTE — PATIENT INSTRUCTIONS
Deciding About Surgery for Lumbar Spinal Stenosis  What is lumbar spinal stenosis? Lumbar spinal stenosis is the narrowing of the spinal canal in the lower back. This occurs when bone and other tissues grow inside the openings in the spinal bones. This can squeeze the nerves that branch out from the spinal cord. The squeezing can cause pain, numbness, or weakness. It happens most often in the legs, feet, or buttocks. You may choose to have surgery to ease your symptoms. Or you may try other treatments instead. These include medicines, exercise, and physical therapy. What are key points about this decision? · If your symptoms are mild to moderate, then medicine, physical therapy, and exercise may be all you need. · You may want surgery if you have tried other treatment for a while and your symptoms are still so bad that you can't do your normal activities. · Your symptoms may come back after a few years. You may need surgery again. · Surgery will most likely help leg pain. But it may not help back pain as much. Why might you choose to have surgery? · Surgery can relieve pain. It can also improve how well you can walk. · You have tried other treatment for a while and your symptoms are still so bad that you can't do your normal activities. · Without surgery, your symptoms may still bother you. If symptoms are very painful, they most often will not improve on their own. · Your doctor may advise surgery if you can't control your bladder or bowels as well as you used to. Surgery is also an option if you notice sudden changes in how well you can walk or you are more clumsy than before. Why might you choose not to have surgery? · You may try other treatments to help your symptoms. These include medicine, exercise, and physical therapy. These other treatments work best for people with mild to moderate symptoms. · Risks from surgery include nerve injury and tissue tears.  And you could have chronic pain, trouble passing urine, and a spine that is not stable. · All surgery has some risks. These include bleeding, infection, and risks from anesthesia. · You may not be able to return to all of your normal activities for many months. · Your symptoms may come back in a few years. You may need surgery again. Your decision  Thinking about the facts and your feelings can help you make a decision that is right for you. Be sure you understand the benefits and risks of your options, and think about what else you need to do before you make the decision. Where can you learn more? Go to http://angelica-vishnu.info/. Enter G631 in the search box to learn more about \"Deciding About Surgery for Lumbar Spinal Stenosis. \"  Current as of: May 23, 2016  Content Version: 11.2  © 2359-6612 [employer]. Care instructions adapted under license by [employer] (which disclaims liability or warranty for this information). If you have questions about a medical condition or this instruction, always ask your healthcare professional. [place] [address] any warranty or liability for your use of this information.

## 2017-05-15 NOTE — PROGRESS NOTES
Terrell Saab [facility] [address]  [address], [address]  [facility], [address]  Phone: [phone]  Fax: [phone]        Lisy Amador  : 1930  PCP: Marco A Crystal MD    PROGRESS NOTE      ASSESSMENT AND PLAN    William Richards was seen today for back pain. Diagnoses and all orders for this visit:    Lumbar stenosis    Other orders  -     SCHEDULE SURGERY    1. Set up for ADAMARIS at L5-S1.   2. Continue Norco.   3. Advised to avoid any repetitive bending, twisting, and lifting. 4. Given surgical information on stenosis. Follow-up Disposition:  Return if symptoms worsen or fail to improve. HISTORY OF PRESENT ILLNESS  Leah Cooley. is a 80 y.o. male. Pt presents to the office for a f/u visit for back pain. He underwent an epidural at L4-5 in 2017 with benefit. He was given a Toradol injection 6 days ago due to LLE flared pain by ORIANA Sarkar. Pt usually takes PRN Norco but has had to take BID due to flared pain. He reports no benefit from his Toradol injection. He states that he hurt his back while working with his grandson 2 weeks ago and was doing a lot of twisting and bending. He states that this caused his radiating LLE pain. His pain started as a soreness and then progressed to a sharp grabbing pain. He notes that as time went on, his pain increased. He denies his radiating pain being severe as he has been able to manage with his pain. Pt notes that lately he has been experiencing a grabbing catching pain in his buttocks bilaterally. He notes that his LLE pain is more of a soreness. Pt is able to push through his pain while walking but he has a short standing tolerance. His radiating LLE pain goes into his buttock and posterior thigh. His RT buttock pain has started to radiate into his thigh as well. Pt notes that he continues to have RLE weakness. He admits to his RLE swinging out with ambulating. Pt has been dragging his leg more often.  His daughter notes that pt has been leaning forward more often. His pain interrupts his sleep at night. No saddle paresthesia. Pt takes Norco  mg BID PRN. Pt denies any dizziness, confusion, uncontrolled constipation, and cravings due to controlled substances. Pt has been taking Remeron to help him sleep. Pt is no longer taking Zanaflex. Denies persistent fevers, chills, weight changes, neurogenic bowel or bladder symptoms. Pt denies recent ED visits or hospitalizations. PMHx of gout, stable. Pain Assessment  5/9/2017   Location of Pain Back   Location Modifiers -   Severity of Pain 9   Quality of Pain Aching   Quality of Pain Comment -   Duration of Pain -   Frequency of Pain Constant   Aggravating Factors -   Limiting Behavior -   Relieving Factors -   Result of Injury No       PAST MEDICAL HISTORY   Past Medical History:   Diagnosis Date    Arthritis     Asthma     Back pain     Edema     Glaucoma     Gout     History of phimosis 06/20/2008    S/p circumcision     Hypertension     Low back pain radiating to both legs     Prostate cancer ([facility] [address]     [id number], s/p RP f/b XRT in 1992 due to PSA recurrence >20 years ago    Renal mass     Sciatica     Spinal stenosis     Thromboembolus ([facility] [address] 2014    Thyroid disease        Past Surgical History:   Procedure Laterality Date    HX CIRCUMCISION  06/20/2008    HX KNEE REPLACEMENT      HX OTHER SURGICAL  10/92    [facility]-Laser surgery    HX OTHER SURGICAL  8/92    Z_PR REMV prostate,retropub, radical   .      MEDICATIONS    Current Outpatient Prescriptions   Medication Sig Dispense Refill    docusate sodium (COLACE) 100 mg capsule Take 100 mg by mouth two (2) times a day.  HYDROcodone-acetaminophen (NORCO)  mg tablet Take 1 Tab by mouth two (2) times daily as needed for Pain. Max Daily Amount: 2 Tabs. Indications: Pain 30 Tab 0    senna (SENOKOT) 8.6 mg tablet Take 1 Tab by mouth daily.  14 Tab 0    albuterol (PROVENTIL HFA, VENTOLIN HFA, PROAIR HFA) 90 mcg/actuation inhaler Take 2 Puffs by inhalation every four (4) hours as needed for Wheezing. 1 Inhaler 0    amLODIPine (NORVASC) 2.5 mg tablet Take by Mouth.  aspirin (ASPIRIN) 325 mg tablet 325 mg.      MYRBETRIQ 50 mg ER tablet       solifenacin (VESICARE) 10 mg tablet Take 1 Tab by mouth daily. Indications: BLADDER HYPERACTIVITY, URINARY URGE INCONTINENCE 90 Tab 3    nortriptyline (PAMELOR) 10 mg capsule Take 1 capsule by mouth  nightly 90 Cap 1    oxybutynin chloride XL (DITROPAN XL) 10 mg CR tablet Take 1 Tab by mouth daily. Indications: BLADDER HYPERACTIVITY, INCREASED URINARY FREQUENCY 90 Tab 3    triamcinolone acetonide (KENALOG) 0.1 % topical cream Apply  to affected area. 3    COMBIGAN 0.2-0.5 % drop ophthalmic solution Administer 1 Drop to both eyes every twelve (12) hours.  lisinopril (PRINIVIL, ZESTRIL) 20 mg tablet Take 20 mg by mouth.  latanoprost (XALATAN) 0.005 % ophthalmic solution Administer 1 Drop to both eyes nightly.  lactulose (CHRONULAC) 10 gram/15 mL solution       amLODIPine (NORVASC) 10 mg tablet Take 10 mg by mouth daily.  leuprolide, 6-month, (LUPRON) 45 mg injection Injection given SQ 1 each 0    aspirin (ASPIRIN) 325 mg tablet Take 325 mg by mouth daily.  allopurinol (ZYLOPRIM) 100 mg tablet Take 100 mg by mouth daily. ALLERGIES  Allergies   Allergen Reactions    Sulfur Rash    Cephalexin Swelling    Colchicine Swelling    Tramadol Nausea Only          SOCIAL HISTORY    Social History     Social History    Marital status: SINGLE     Spouse name: N/A    Number of children: N/A    Years of education: N/A     Occupational History    Not on file.      Social History Main Topics    Smoking status: Former Smoker    Smokeless tobacco: Never Used      Comment: Quit in 1953    Alcohol use No    Drug use: No    Sexual activity: Not on file     Other Topics Concern    Not on file     Social History Narrative       FAMILY HISTORY  Family History   Problem Relation Age of Onset    Arthritis-osteo Father     Asthma Other        REVIEW OF SYSTEMS  Review of Systems   Constitutional: Negative for chills, fever and weight loss. Respiratory: Negative for shortness of breath. Cardiovascular: Negative for chest pain. Gastrointestinal: Negative for constipation. Negative for fecal incontinence   Genitourinary: Negative for dysuria. Negative for urinary incontinence   Musculoskeletal:        Per HPI   Skin: Negative for rash. Neurological: Positive for focal weakness. Negative for dizziness, tingling, tremors and headaches. Endo/Heme/Allergies: Does not bruise/bleed easily. Psychiatric/Behavioral: The patient does not have insomnia. PHYSICAL EXAMINATION  Visit Vitals    /50    Pulse 82    Temp 97.2 °F (36.2 °C) (Oral)    Resp 18    Ht 5' 8\" (1.727 m)    Wt 177 lb (80.3 kg)    BMI 26.91 kg/m2         Accompanied by family member. Constitutional:  Well developed, well nourished, in no acute distress. Psychiatric: Affect and mood are appropriate. Integumentary: No rashes or abrasions noted on exposed areas. Cardiovascular/Peripheral Vascular: Intact l pulses. 1+ nonpitting edema BLE  Lymphatic:  No evidence of lymphedema. No cervical lymphadenopathy. SPINE/MUSCULOSKELETAL EXAM      Lumbar spine:  No rash, ecchymosis, or gross obliquity. No fasciculations. No focal atrophy is noted. Tenderness to palpation L4-5. No tenderness to palpation at the sciatic notch. SI joints non-tender. Trochanters non tender. Sensation grossly intact to light touch. MOTOR:       Hip Flex  Quads Hamstrings Ankle DF EHL Ankle PF   Right 4+/5 4+/5 4+/5 4/5 4/5 4+/5   Left +4/5 +4/5 +4/5 +4/5 +4/5 +4/5       Straight Leg raise + bilaterally at 90 degrees, R>L. Ambulation with single point cane.        Written by Jorge Gil, as dictated by Katie Son MD.    I, Dr. Katie Son, MD, confirm that all documentation is accurate. Mr. Kiko Arevalo may have a reminder for a \"due or due soon\" health maintenance. I have asked that he contact his primary care provider for follow-up on this health maintenance.

## 2017-05-15 NOTE — MR AVS SNAPSHOT
Visit Information Date & Time Provider Department Dept. Phone Encounter #  
 5/15/2017  3:45 PM Martha Christine MD [facility] [phone] [id number] Follow-up Instructions Return if symptoms worsen or fail to improve. Your Appointments 7/5/2017  3:30 PM  
Follow Up with Martha Christine MD  
[facility] ([address] [place] [address]) Appt Note: BLOCK FU; BOB 5/23/17  
 [address] [facility] [address]  
[phone]  
  
   
 [address] [facility],[address] [place]  
  
    
 8/7/2017 12:50 PM  
Follow Up with Martha Christine MD  
[facility] ([address] [place] [address]) Appt Note: 3 MONTH FU - NO COPAY PT AWARE  
 [address] [facility] [address]  
[phone]  
  
    
  
 5/25/2017 11:30 AM  
Any with Minh Crespo MD  
[facility] ([address] [place] [address]) Appt Note: 2m fu dexa at [facility] 02/15/17 at 11:30 check in; Mailed appt [address] [facility] [address]  
[phone]  
  
   
 [address] [place]  
  
    
 7/31/2017  1:00 PM  
Any with Minh Crespo MD  
[facility] ([address] [place] [address]) Appt Note: Eligard due 7/28-8/3  
 [address] [facility]. 
[address]  
[phone] Upcoming Health Maintenance Date Due DTaP/Tdap/Td series (1 - Tdap) 2/28/1951 ZOSTER VACCINE AGE 60> 2/28/1990 GLAUCOMA SCREENING Q2Y 2/28/1995 Pneumococcal 65+ High/Highest Risk (1 of 2 - PCV13) 2/28/1995 MEDICARE YEARLY EXAM 2/28/1995 INFLUENZA AGE 9 TO ADULT 8/1/2017 Allergies as of 5/15/2017  Review Complete On: 5/15/2017 By: Martha Christine MD  
  
 Severity Noted Reaction Type Reactions Sulfur Medium 04/10/2013   Side Effect Rash Cephalexin  07/07/2008    Swelling Colchicine  08/22/2012    Swelling Tramadol Low 01/21/2016    Nausea Only Current Immunizations  Never Reviewed No immunizations on file. Not reviewed this visit You Were Diagnosed With   
  
 Codes Comments Lumbar stenosis    -  Primary ICD-10-CM: M48.06 
ICD-9-CM: 724.02 Vitals BP Pulse Temp Resp Height(growth percentile) Weight(growth percentile) 136/50 82 97.2 °F (36.2 °C) (Oral) 18 5' 8\" (1.727 m) 177 lb (80.3 kg) BMI Smoking Status 26.91 kg/m2 Former Smoker BMI and BSA Data Body Mass Index Body Surface Area  
 26.91 kg/m 2 1.96 m 2 Preferred Pharmacy Pharmacy Name Phone [facility] #[id number] Arellano [address] [phone] Your Updated Medication List  
  
   
This list is accurate as of: 5/15/17  4:04 PM.  Always use your most recent med list.  
  
  
  
  
 albuterol 90 mcg/actuation inhaler Commonly known as:  PROVENTIL HFA, VENTOLIN HFA, PROAIR HFA Take 2 Puffs by inhalation every four (4) hours as needed for Wheezing. amLODIPine 10 mg tablet Commonly known as:  Benjiman Floro Take 10 mg by mouth daily. aspirin 325 mg tablet Commonly known as:  ASPIRIN Take 325 mg by mouth daily. COMBIGAN 0.2-0.5 % Drop ophthalmic solution Generic drug:  brimonidine-timolol Administer 1 Drop to both eyes every twelve (12) hours. fluticasone 50 mcg/actuation nasal spray Commonly known as:  Esperanza Darnell SPRAY 1 SPRAY INTO EACH NOSTRIL DAILY  
  
 furosemide 20 mg tablet Commonly known as:  LASIX TAKE 1 TABLET BY MOUTH DAILY HYDROcodone-acetaminophen  mg tablet Commonly known as:  Jerl Ards Take 1 Tab by mouth two (2) times daily as needed for Pain. Max Daily Amount: 2 Tabs. Indications: Pain  
  
 latanoprost 0.005 % ophthalmic solution Commonly known as:  Roberto Trivedi Administer 1 Drop to both eyes nightly. lisinopril 20 mg tablet Commonly known as:  Rexannmigel  Take 20 mg by mouth. mirtazapine 15 mg tablet Commonly known as:  REMERON  
TAKE 1 TABLET BY MOUTH AT BEDTIME. PLEASE CALL MD IF SIDE EFFECTS OCCUR  
  
 MYRBETRIQ 50 mg ER tablet Generic drug:  mirabegron ER  
  
 oxybutynin chloride XL 10 mg CR tablet Commonly known as:  DITROPAN XL Take 1 Tab by mouth daily. Indications: BLADDER HYPERACTIVITY, INCREASED URINARY FREQUENCY  
  
 senna 8.6 mg tablet Commonly known as:  [employer] Take 1 Tab by mouth daily. solifenacin 10 mg tablet Commonly known as:  [place] [employer] Take 1 Tab by mouth daily. Indications: BLADDER HYPERACTIVITY, URINARY URGE INCONTINENCE Follow-up Instructions Return if symptoms worsen or fail to improve. Patient Instructions Deciding About Surgery for Lumbar Spinal Stenosis What is lumbar spinal stenosis? Lumbar spinal stenosis is the narrowing of the spinal canal in the lower back. This occurs when bone and other tissues grow inside the openings in the spinal bones. This can squeeze the nerves that branch out from the spinal cord. The squeezing can cause pain, numbness, or weakness. It happens most often in the legs, feet, or buttocks. You may choose to have surgery to ease your symptoms. Or you may try other treatments instead. These include medicines, exercise, and physical therapy. What are key points about this decision? · If your symptoms are mild to moderate, then medicine, physical therapy, and exercise may be all you need. · You may want surgery if you have tried other treatment for a while and your symptoms are still so bad that you can't do your normal activities. · Your symptoms may come back after a few years. You may need surgery again. · Surgery will most likely help leg pain. But it may not help back pain as much. Why might you choose to have surgery? · Surgery can relieve pain. It can also improve how well you can walk.  
· You have tried other treatment for a while and your symptoms are still so bad that you can't do your normal activities. · Without surgery, your symptoms may still bother you. If symptoms are very painful, they most often will not improve on their own. · Your doctor may advise surgery if you can't control your bladder or bowels as well as you used to. Surgery is also an option if you notice sudden changes in how well you can walk or you are more clumsy than before. Why might you choose not to have surgery? · You may try other treatments to help your symptoms. These include medicine, exercise, and physical therapy. These other treatments work best for people with mild to moderate symptoms. · Risks from surgery include nerve injury and tissue tears. And you could have chronic pain, trouble passing urine, and a spine that is not stable. · All surgery has some risks. These include bleeding, infection, and risks from anesthesia. · You may not be able to return to all of your normal activities for many months. · Your symptoms may come back in a few years. You may need surgery again. Your decision Thinking about the facts and your feelings can help you make a decision that is right for you. Be sure you understand the benefits and risks of your options, and think about what else you need to do before you make the decision. Where can you learn more? Go to http://angelica-vishnu.info/. Enter R821 in the search box to learn more about \"Deciding About Surgery for Lumbar Spinal Stenosis. \" Current as of: May 23, 2016 Content Version: 11.2 © 2417-9370 [employer]. Care instructions adapted under license by [employer] (which disclaims liability or warranty for this information). If you have questions about a medical condition or this instruction, always ask your healthcare professional. [place] [address] any warranty or liability for your use of this information. Introducing [facility]! [facility] introduces [employer] patient portal. Now you can access parts of your medical record, email your doctor's office, and request medication refills online. 1. In your internet browser, go to [email or web address] 2. Click on the First Time User? Click Here link in the Sign In box. You will see the New Member Sign Up page. 3. Enter your [employer] Access Code exactly as it appears below. You will not need to use this code after youve completed the sign-up process. If you do not sign up before the expiration date, you must request a new code. · [employer] Access Code: [email or web address] Expires: 8/7/2017 10:43 AM 
 
4. Enter the last four digits of your Social Security Number (xxxx) and Date of Birth (mm/dd/yyyy) as indicated and click Submit. You will be taken to the next sign-up page. 5. Create a [employer] ID. This will be your [employer] login ID and cannot be changed, so think of one that is secure and easy to remember. 6. Create a [employer] password. You can change your password at any time. 7. Enter your Password Reset Question and Answer. This can be used at a later time if you forget your password. 8. Enter your e-mail address. You will receive e-mail notification when new information is available in [address] 9. Click Sign Up. You can now view and download portions of your medical record. 10. Click the Download Summary menu link to download a portable copy of your medical information. If you have questions, please visit the Frequently Asked Questions section of the [employer] website. Remember, [employer] is NOT to be used for urgent needs. For medical emergencies, dial 911. Now available from your iPhone and Android! Please provide this summary of care documentation to your next provider. Your primary care clinician is listed as Maryann Bolton. If you have any questions after today's visit, please call [phone].

## 2017-05-18 RX ORDER — ASPIRIN 81 MG/1
81 TABLET ORAL DAILY
COMMUNITY

## 2017-05-23 ENCOUNTER — HOSPITAL ENCOUNTER (OUTPATIENT)
Age: 82
Setting detail: OUTPATIENT SURGERY
Discharge: HOME | End: 2017-05-23
Attending: PHYSICAL MEDICINE & REHABILITATION | Admitting: PHYSICAL MEDICINE & REHABILITATION
Payer: MEDICARE

## 2017-05-23 ENCOUNTER — APPOINTMENT (OUTPATIENT)
Dept: GENERAL RADIOLOGY | Age: 82
End: 2017-05-23
Attending: PHYSICAL MEDICINE & REHABILITATION
Payer: MEDICARE

## 2017-05-23 VITALS
HEIGHT: 70 IN | WEIGHT: 174 LBS | SYSTOLIC BLOOD PRESSURE: 152 MMHG | BODY MASS INDEX: 24.91 KG/M2 | RESPIRATION RATE: 18 BRPM | TEMPERATURE: 98.7 F | OXYGEN SATURATION: 99 % | DIASTOLIC BLOOD PRESSURE: 78 MMHG | HEART RATE: 77 BPM

## 2017-05-23 PROCEDURE — 77030014124 HC TY EPDRL BBMI -A: Performed by: PHYSICAL MEDICINE & REHABILITATION

## 2017-05-23 PROCEDURE — 74011000250 HC RX REV CODE- 250

## 2017-05-23 PROCEDURE — 74011250636 HC RX REV CODE- 250/636

## 2017-05-23 PROCEDURE — 76010000009 HC PAIN MGT 0 TO 30 MIN PROC: Performed by: PHYSICAL MEDICINE & REHABILITATION

## 2017-05-23 PROCEDURE — 74011636320 HC RX REV CODE- 636/320

## 2017-05-23 PROCEDURE — 77030003543 HC NDL EPDRL BBMI -A: Performed by: PHYSICAL MEDICINE & REHABILITATION

## 2017-05-23 RX ORDER — DIAZEPAM 5 MG/1
2.5-1 TABLET ORAL ONCE
Status: DISCONTINUED | OUTPATIENT
Start: 2017-05-23 | End: 2017-05-23 | Stop reason: HOSPADM

## 2017-05-23 RX ORDER — LIDOCAINE HYDROCHLORIDE 10 MG/ML
INJECTION, SOLUTION EPIDURAL; INFILTRATION; INTRACAUDAL; PERINEURAL AS NEEDED
Status: DISCONTINUED | OUTPATIENT
Start: 2017-05-23 | End: 2017-05-23 | Stop reason: HOSPADM

## 2017-05-23 RX ORDER — DEXAMETHASONE SODIUM PHOSPHATE 100 MG/10ML
INJECTION INTRAMUSCULAR; INTRAVENOUS AS NEEDED
Status: DISCONTINUED | OUTPATIENT
Start: 2017-05-23 | End: 2017-05-23 | Stop reason: HOSPADM

## 2017-05-23 NOTE — DISCHARGE INSTRUCTIONS
[facility]   (CHILO)  Dr. Erin Kline, Dr. Pardeep Haas, Dr. Granados Bent not drive a car, operate heavy machinery or dangerous equipment for 24 hours. * Activity as tolerated; rest for the remainder of the day. * Resume pre-block medications including those for your family doctor. * Do not drink alcoholic beverages for 24 hours. Alcohol and the medications you have received may interact and cause an adverse reaction. * You may feel better this evening and worse tomorrow, as the numbing medications wears off and the steroid has yet to begin to work. After 48 hrs the steroid should begin to release bringing you relief. * You may shower this evening and remove any bandages. * Avoid hot tubs and heating pads for 24 hours. You may use cold packs on the procedure site as tolerated for the first 24 hours. * If a headache develops, drink plenty of fluids and rest.  Take over the counter medications for headache if needed. If the headache continues longer than 24 hours, call MD at the [address] [place] [address]. [phone]    * Continue taking pain medications as needed. * You may resume your regular diet if tolerated. Otherwise, start with sips of water and advance slowly. * If Diabetic: check your blood sugar three times a day for the next 3 days. If your sugar is greater than 300 call your family doctor. If your sugar is greater than 400, have someone transport you to the nearest Emergency Room. * If you experience any of the following problems, Please Call the [address] [place] [address] at [phone].         * Shortness of Breath    * Fever of 101 or higher    * Nausea / Vomiting    * Severe Headache    * Weakness or numbness in arms or legs that is not      resolving    * Prolonged increase in pain greater than 4 days      DISCHARGE SUMMARY from Nurse      PATIENT INSTRUCTIONS:    After oral sedation, for 24 hours or while taking prescription Narcotics:  · Limit your activities  · Do not drive and operate hazardous machinery  · Do not make important personal or business decisions  · Do  not drink alcoholic beverages  · If you have not urinated within 8 hours after discharge, please contact your surgeon on call. Report the following to your surgeon:  · Excessive pain, swelling, redness or odor of or around the surgical area  · Temperature over 101  · Nausea and vomiting lasting longer than 4 hours or if unable to take medications  · Any signs of decreased circulation or nerve impairment to extremity: change in color, persistent  numbness, tingling, coldness or increase pain  · Any questions            What to do at Home:  Recommended activity: Activity as tolerated, NO DRIVING FOR 12 Hours post injection          *  Please give a list of your current medications to your Primary Care Provider. *  Please update this list whenever your medications are discontinued, doses are      changed, or new medications (including over-the-counter products) are added. *  Please carry medication information at all times in case of emergency situations. These are general instructions for a healthy lifestyle:    No smoking/ No tobacco products/ Avoid exposure to second hand smoke    Surgeon General's Warning:  Quitting smoking now greatly reduces serious risk to your health. Obesity, smoking, and sedentary lifestyle greatly increases your risk for illness    A healthy diet, regular physical exercise & weight monitoring are important for maintaining a healthy lifestyle    You may be retaining fluid if you have a history of heart failure or if you experience any of the following symptoms:  Weight gain of 3 pounds or more overnight or 5 pounds in a week, increased swelling in our hands or feet or shortness of breath while lying flat in bed.   Please call your doctor as soon as you notice any of these symptoms; do not wait until your next office visit. Recognize signs and symptoms of STROKE:    F-face looks uneven    A-arms unable to move or move unevenly    S-speech slurred or non-existent    T-time-call 911 as soon as signs and symptoms begin-DO NOT go       Back to bed or wait to see if you get better-TIME IS BRAIN.

## 2017-05-23 NOTE — H&P (VIEW-ONLY)
Terrell Saab [address]  [address], [address]  [place], [address] [place] [address] Street  Phone: [phone]  Fax: [phone]        Marcial Osorio  : 1930  PCP: Jean Jacques MD    PROGRESS NOTE      ASSESSMENT AND PLAN    Paco Del Cid was seen today for back pain. Diagnoses and all orders for this visit:    Lumbar stenosis    Other orders  -     SCHEDULE SURGERY    1. Set up for ADAMARIS at L5-S1.   2. Continue Norco.   3. Advised to avoid any repetitive bending, twisting, and lifting. 4. Given surgical information on stenosis. Follow-up Disposition:  Return if symptoms worsen or fail to improve. HISTORY OF PRESENT ILLNESS  Leah Castorena. is a 80 y.o. male. Pt presents to the office for a f/u visit for back pain. He underwent an epidural at L4-5 in 2017 with benefit. He was given a Toradol injection 6 days ago due to LLE flared pain by ORIANA Sarkar. Pt usually takes PRN Norco but has had to take BID due to flared pain. He reports no benefit from his Toradol injection. He states that he hurt his back while working with his grandson 2 weeks ago and was doing a lot of twisting and bending. He states that this caused his radiating LLE pain. His pain started as a soreness and then progressed to a sharp grabbing pain. He notes that as time went on, his pain increased. He denies his radiating pain being severe as he has been able to manage with his pain. Pt notes that lately he has been experiencing a grabbing catching pain in his buttocks bilaterally. He notes that his LLE pain is more of a soreness. Pt is able to push through his pain while walking but he has a short standing tolerance. His radiating LLE pain goes into his buttock and posterior thigh. His RT buttock pain has started to radiate into his thigh as well. Pt notes that he continues to have RLE weakness. He admits to his RLE swinging out with ambulating. Pt has been dragging his leg more often.  His daughter notes that pt has been leaning forward more often. His pain interrupts his sleep at night. No saddle paresthesia. Pt takes Norco  mg BID PRN. Pt denies any dizziness, confusion, uncontrolled constipation, and cravings due to controlled substances. Pt has been taking Remeron to help him sleep. Pt is no longer taking Zanaflex. Denies persistent fevers, chills, weight changes, neurogenic bowel or bladder symptoms. Pt denies recent ED visits or hospitalizations. PMHx of gout, stable. Pain Assessment  5/9/2017   Location of Pain Back   Location Modifiers -   Severity of Pain 9   Quality of Pain Aching   Quality of Pain Comment -   Duration of Pain -   Frequency of Pain Constant   Aggravating Factors -   Limiting Behavior -   Relieving Factors -   Result of Injury No       PAST MEDICAL HISTORY   Past Medical History:   Diagnosis Date    Arthritis     Asthma     Back pain     Edema     Glaucoma     Gout     History of phimosis 06/20/2008    S/p circumcision     Hypertension     Low back pain radiating to both legs     Prostate cancer ([facility] [address]     [id number], s/p RP f/b XRT in 1992 due to PSA recurrence >20 years ago    Renal mass     Sciatica     Spinal stenosis     Thromboembolus ([facility] [address] 2014    Thyroid disease        Past Surgical History:   Procedure Laterality Date    HX CIRCUMCISION  06/20/2008    HX KNEE REPLACEMENT      HX OTHER SURGICAL  10/92    CHGA-Laser surgery    HX OTHER SURGICAL  8/92    Z_PR REMV prostate,retropub, radical   .      MEDICATIONS    Current Outpatient Prescriptions   Medication Sig Dispense Refill    docusate sodium (COLACE) 100 mg capsule Take 100 mg by mouth two (2) times a day.  HYDROcodone-acetaminophen (NORCO)  mg tablet Take 1 Tab by mouth two (2) times daily as needed for Pain. Max Daily Amount: 2 Tabs. Indications: Pain 30 Tab 0    senna (SENOKOT) 8.6 mg tablet Take 1 Tab by mouth daily.  14 Tab 0    albuterol (PROVENTIL HFA, VENTOLIN HFA, PROAIR HFA) 90 mcg/actuation inhaler Take 2 Puffs by inhalation every four (4) hours as needed for Wheezing. 1 Inhaler 0    amLODIPine (NORVASC) 2.5 mg tablet Take by Mouth.  aspirin (ASPIRIN) 325 mg tablet 325 mg.      MYRBETRIQ 50 mg ER tablet       solifenacin (VESICARE) 10 mg tablet Take 1 Tab by mouth daily. Indications: BLADDER HYPERACTIVITY, URINARY URGE INCONTINENCE 90 Tab 3    nortriptyline (PAMELOR) 10 mg capsule Take 1 capsule by mouth  nightly 90 Cap 1    oxybutynin chloride XL (DITROPAN XL) 10 mg CR tablet Take 1 Tab by mouth daily. Indications: BLADDER HYPERACTIVITY, INCREASED URINARY FREQUENCY 90 Tab 3    triamcinolone acetonide (KENALOG) 0.1 % topical cream Apply  to affected area. 3    COMBIGAN 0.2-0.5 % drop ophthalmic solution Administer 1 Drop to both eyes every twelve (12) hours.  lisinopril (PRINIVIL, ZESTRIL) 20 mg tablet Take 20 mg by mouth.  latanoprost (XALATAN) 0.005 % ophthalmic solution Administer 1 Drop to both eyes nightly.  lactulose (CHRONULAC) 10 gram/15 mL solution       amLODIPine (NORVASC) 10 mg tablet Take 10 mg by mouth daily.  leuprolide, 6-month, (LUPRON) 45 mg injection Injection given SQ 1 each 0    aspirin (ASPIRIN) 325 mg tablet Take 325 mg by mouth daily.  allopurinol (ZYLOPRIM) 100 mg tablet Take 100 mg by mouth daily. ALLERGIES  Allergies   Allergen Reactions    Sulfur Rash    Cephalexin Swelling    Colchicine Swelling    Tramadol Nausea Only          SOCIAL HISTORY    Social History     Social History    Marital status: SINGLE     Spouse name: N/A    Number of children: N/A    Years of education: N/A     Occupational History    Not on file.      Social History Main Topics    Smoking status: Former Smoker    Smokeless tobacco: Never Used      Comment: Quit in 1953    Alcohol use No    Drug use: No    Sexual activity: Not on file     Other Topics Concern    Not on file     Social History Narrative       FAMILY HISTORY  Family History   Problem Relation Age of Onset    Arthritis-osteo Father     Asthma Other        REVIEW OF SYSTEMS  Review of Systems   Constitutional: Negative for chills, fever and weight loss. Respiratory: Negative for shortness of breath. Cardiovascular: Negative for chest pain. Gastrointestinal: Negative for constipation. Negative for fecal incontinence   Genitourinary: Negative for dysuria. Negative for urinary incontinence   Musculoskeletal:        Per HPI   Skin: Negative for rash. Neurological: Positive for focal weakness. Negative for dizziness, tingling, tremors and headaches. Endo/Heme/Allergies: Does not bruise/bleed easily. Psychiatric/Behavioral: The patient does not have insomnia. PHYSICAL EXAMINATION  Visit Vitals    /50    Pulse 82    Temp 97.2 °F (36.2 °C) (Oral)    Resp 18    Ht 5' 8\" (1.727 m)    Wt 177 lb (80.3 kg)    BMI 26.91 kg/m2         Accompanied by family member. Constitutional:  Well developed, well nourished, in no acute distress. Psychiatric: Affect and mood are appropriate. Integumentary: No rashes or abrasions noted on exposed areas. Cardiovascular/Peripheral Vascular: Intact l pulses. 1+ nonpitting edema BLE  Lymphatic:  No evidence of lymphedema. No cervical lymphadenopathy. SPINE/MUSCULOSKELETAL EXAM      Lumbar spine:  No rash, ecchymosis, or gross obliquity. No fasciculations. No focal atrophy is noted. Tenderness to palpation L4-5. No tenderness to palpation at the sciatic notch. SI joints non-tender. Trochanters non tender. Sensation grossly intact to light touch. MOTOR:       Hip Flex  Quads Hamstrings Ankle DF EHL Ankle PF   Right 4+/5 4+/5 4+/5 4/5 4/5 4+/5   Left +4/5 +4/5 +4/5 +4/5 +4/5 +4/5       Straight Leg raise + bilaterally at 90 degrees, R>L. Ambulation with single point cane.        Written by Verona Sibley, as dictated by aPtricio Voss MD.    I, Dr. Patricio Voss, MD, confirm that all documentation is accurate. Mr. Saundra Olivera may have a reminder for a \"due or due soon\" health maintenance. I have asked that he contact his primary care provider for follow-up on this health maintenance.

## 2017-05-23 NOTE — PROCEDURES
Intralaminar Epidural Steroid Procedure Note        Patient Name   Fadi [facility] Date of Procedure: May 23, 2017    Preoperative Diagnosis: Lumbar spinal stenosis [M48.06]    Postoperative Diagnosis: Same      Procedure:  Epidural Steroid Injection    Consent:  Informed consent was obtained prior to the procedure. In addition to the potential risks associated with the procedure itself, the patient was informed both verbally and in writing of the potential side effects of the use of glucocorticoid. The patient appeared to comprehend the informed consent and desired to have the procedure performed. Procedure in Detail:  The patient was taken to the procedure suite and placed in the prone position on the operating table on appropriate padding. The posterior lumbar region was prepped and draped in the usual sterile fashion. Intraoperative fluoroscopy was used to localize the L5-S1 interspace. The skin was infiltrated with 1% lidocaine. An 18-gauge Tuohy needle was advanced into the epidural space at L5-S1 under fluoroscopic guidance using the loss of resistance technique. No cerebrospinal fluid was seen throughout the procedure. Yes  A small amount of Isovue was injected into the epidural space, confirming appropriated needle placement on fluoroscopy. No vascular uptake was identified. Next, 2ml of 1% Lidocaine and 30mg of preservative free Dexamethasone were injected via the Tuohy needle. The needle was removed from the patient. The patient tolerated the procedure well and was discharged home with designated  and care instructions.       Signed By: Susy Clifford MD                        May 23, 2017

## 2017-05-23 NOTE — INTERVAL H&P NOTE
H&P Update:  [facility]. was seen and briefly examined. History and physical has been reviewed.  There have been no significant clinical changes since the completion of the originally dated History and Physical.    Signed By: Felipa Arroyo MD     May 23, 2017 2:54 PM

## 2017-05-25 PROBLEM — T38.7X5A OSTEOPOROSIS DUE TO ANDROGEN THERAPY: Status: ACTIVE | Noted: 2017-05-25

## 2017-05-25 PROBLEM — M81.8 OSTEOPOROSIS DUE TO ANDROGEN THERAPY: Status: ACTIVE | Noted: 2017-05-25

## 2017-05-29 NOTE — PROGRESS NOTES
[facility] Letty Reynoso  [address] [facility]  [phone] fax    Physical Therapy Discharge Summary    Patient name: Yana Barnhart Start of Care: 17   Referral source: Jalen Isaacs MD : 1930   Medical/Treatment Diagnosis: Pain in right shoulder [M25.511]  Other specific arthropathies, not elsewhere classified, right shoulder [M12.811]  Primary osteoarthritis, right shoulder [M19.011]  Bursitis of right shoulder [M75.51]  Other chronic pain [G89.29] Onset Date:16     Prior Hospitalization: see medical history Provider#: [id number]   Medications: Verified on Patient Summary List    Comorbidities: HTN, arthritis, hearing impaired, hx of prostate cancer   Prior Level of Function: , retired singer, lives alone but with family coming over constantly, R handed, walking, light lifting    Visits from Start of Care: 1    Missed Visits: 0    Reporting Period : 17 to 17    Summary of Care:  Short Term Goals: To be accomplished in 1 weeks:  1. Pt will be compliant with initial HEP  Long Term Goals: To be accomplished in 6 weeks:  1. Pt will improve FOTO by 5 points in order to demonstrate functional improvement   2. Pt will improve R shoulder strength to 3-/5 in order to improve ease of light ADLs  3. Pt will improve R shoulder elevation to >90dgrs in order to improve ease of reaching overhead  4. Pt will report <5/10 average pain in order to demonstrate improved QOL     No goals met as pt did not return to therapy after initial evaluation       ASSESSMENT/RECOMMENDATIONS:  Pt did not return to therapy after initial evaluation, reporting he wanted to follow up with MD.  Pt has not called to schedule and is DC at this time.       [x]Discontinue therapy: []Patient has reached or is progressing toward set goals      [x]Patient is non-compliant or has abdicated      []Due to lack of appreciable progress towards set goals    Jenni Gunn, PT 5/29/2017 9:37 AM

## 2017-06-22 ENCOUNTER — HOSPITAL ENCOUNTER (OUTPATIENT)
Dept: NON INVASIVE DIAGNOSTICS | Age: 82
Discharge: HOME | End: 2017-06-22
Attending: FAMILY MEDICINE
Payer: MEDICARE

## 2017-06-22 DIAGNOSIS — I10 ESSENTIAL HYPERTENSION: ICD-10-CM

## 2017-06-22 DIAGNOSIS — R01.1 SYSTOLIC MURMUR: ICD-10-CM

## 2017-06-22 PROCEDURE — 93306 TTE W/DOPPLER COMPLETE: CPT

## 2017-07-05 ENCOUNTER — OFFICE VISIT (OUTPATIENT)
Dept: ORTHOPEDIC SURGERY | Age: 82
End: 2017-07-05

## 2017-07-05 VITALS
TEMPERATURE: 97.7 F | BODY MASS INDEX: 25.2 KG/M2 | OXYGEN SATURATION: 98 % | SYSTOLIC BLOOD PRESSURE: 143 MMHG | WEIGHT: 176 LBS | RESPIRATION RATE: 18 BRPM | DIASTOLIC BLOOD PRESSURE: 63 MMHG | HEIGHT: 70 IN | HEART RATE: 78 BPM

## 2017-07-05 DIAGNOSIS — M48.061 LUMBAR STENOSIS: Primary | Chronic | ICD-10-CM

## 2017-07-05 RX ORDER — OMEPRAZOLE 20 MG/1
20 CAPSULE, DELAYED RELEASE ORAL DAILY
COMMUNITY
Start: 2017-06-12

## 2017-07-05 NOTE — PROGRESS NOTES
[facility] [address]  [place]  Phone: [phone]  Fax: [phone]        Stefania Holt  : 1930  PCP: Benjamin Gotti MD    PROGRESS NOTE      [address] Ifrah Christine was seen today for back pain. Diagnoses and all orders for this visit:    Lumbar stenosis    1. Continue Norco prn.   2. Pt may call and be scheduled for Lumbar epidural at L5-S1  3. Advised to stay active as tolerated. Follow-up Disposition:  Return if symptoms worsen or fail to improve. HISTORY OF PRESENT ILLNESS  Leah Guzman. is a 80 y.o. male. Pt presents to the office for a f/u visit for back pain. Pt underwent an ADAMARIS at L5-S1 on 17. Pt reports good benefit from his injection. No negative side effects from his injections. He states that is took about 10 days for his injection to begin to work. He has some pain but notes that his pain level has gone down greatly. He continues to have some stiffness in his back. He is careful of what activities he is involved in as to limit his pain. Pt reports swelling in his legs, L>R. He is doing well with his pain. He has good and bad days with his pain. Pt reports that his quality of life has become much better since his injection. No saddle paresthesia. He takes Norco  mg BID PRN. Pt denies any dizziness, confusion, uncontrolled constipation, and cravings due to controlled substances. Denies persistent fevers, chills, weight changes, neurogenic bowel or bladder symptoms. Pt denies recent ED visits or hospitalizations. Takes Tiarno Di Sopra only for severe pain.       Pain Assessment  2017   Location of Pain Back   Location Modifiers -   Severity of Pain 3   Quality of Pain Aching   Quality of Pain Comment -   Duration of Pain Persistent   Frequency of Pain Constant   Aggravating Factors -   Aggravating Factors Comment -   Limiting Behavior -   Relieving Factors -   Relieving Factors Comment - Result of Injury No   Work-Related Injury -       PAST MEDICAL HISTORY   Past Medical History:   Diagnosis Date    Arthritis     Asthma     Back pain     Edema     Glaucoma     Gout     History of phimosis 06/20/2008    S/p circumcision     Hypertension     Low back pain radiating to both legs     Prostate cancer ([facility] [address]     [id number], s/p RP f/b XRT in 1992 due to PSA recurrence >20 years ago    Renal mass     Sciatica     Spinal stenosis     Thromboembolus ([facility] [address] 2014    Thyroid disease        Past Surgical History:   Procedure Laterality Date    HX CIRCUMCISION  06/20/2008    HX KNEE REPLACEMENT      HX OTHER SURGICAL  10/92    [facility]-Laser surgery    HX OTHER SURGICAL  8/92    Z_PR REMV prostate,retropub, radical   .      MEDICATIONS    Current Outpatient Prescriptions   Medication Sig Dispense Refill    omeprazole (PRILOSEC) 20 mg capsule Take 20 mg by mouth daily.  aspirin delayed-release 81 mg tablet Take 81 mg by mouth daily.  fluticasone (FLONASE) 50 mcg/actuation nasal spray SPRAY 1 SPRAY INTO EACH NOSTRIL DAILY  1    furosemide (LASIX) 20 mg tablet TAKE 1 TABLET BY MOUTH DAILY  1    mirtazapine (REMERON) 15 mg tablet TAKE 1 TABLET BY MOUTH AT BEDTIME. Naresh Perez MD IF SIDE EFFECTS OCCUR  1    albuterol (PROVENTIL HFA, VENTOLIN HFA, PROAIR HFA) 90 mcg/actuation inhaler Take 2 Puffs by inhalation every four (4) hours as needed for Wheezing. 1 Inhaler 0    COMBIGAN 0.2-0.5 % drop ophthalmic solution Administer 1 Drop to both eyes every twelve (12) hours.  latanoprost (XALATAN) 0.005 % ophthalmic solution Administer 1 Drop to both eyes nightly.  amLODIPine (NORVASC) 10 mg tablet Take 10 mg by mouth daily.  HYDROcodone-acetaminophen (NORCO)  mg tablet Take 1 Tab by mouth two (2) times daily as needed for Pain. Max Daily Amount: 2 Tabs. Indications: Pain 30 Tab 0    senna (SENOKOT) 8.6 mg tablet Take 1 Tab by mouth daily.  14 Tab 0        ALLERGIES  Allergies Allergen Reactions    Sulfur Rash    Cephalexin Swelling     Pt denies this allergy      Colchicine Swelling    Tramadol Nausea Only          SOCIAL HISTORY    Social History     Social History    Marital status: SINGLE     Spouse name: N/A    Number of children: N/A    Years of education: N/A     Occupational History    Not on file. Social History Main Topics    Smoking status: Former Smoker    Smokeless tobacco: Never Used      Comment: Quit in 1953    Alcohol use No    Drug use: No    Sexual activity: Not on file     Other Topics Concern    Not on file     Social History Narrative       FAMILY HISTORY  Family History   Problem Relation Age of Onset    Arthritis-osteo Father     Asthma Other        REVIEW OF SYSTEMS  Review of Systems   Constitutional: Negative for chills, fever and weight loss. Respiratory: Negative for shortness of breath. Cardiovascular: Negative for chest pain. Gastrointestinal: Negative for constipation. Negative for fecal incontinence   Genitourinary: Negative for dysuria. Negative for urinary incontinence   Musculoskeletal:        Per HPI   Skin: Negative for rash. Neurological: Negative for dizziness, tingling, tremors, focal weakness and headaches. Endo/Heme/Allergies: Does not bruise/bleed easily. Psychiatric/Behavioral: The patient does not have insomnia. PHYSICAL EXAMINATION  Visit Vitals    /63    Pulse 78    Temp 97.7 °F (36.5 °C) (Oral)    Resp 18    Ht 5' 10\" (1.778 m)    Wt 176 lb (79.8 kg)    SpO2 98%    BMI 25.25 kg/m2         Accompanied by self. Constitutional:  Well developed, well nourished, in no acute distress. Psychiatric: Affect and mood are appropriate. Integumentary: No rashes or abrasions noted on exposed areas. Cardiovascular/Peripheral Vascular: Intact l pulses. 1+ nonpitting edema BLE. Lymphatic:  No evidence of lymphedema. No cervical lymphadenopathy.      SPINE/MUSCULOSKELETAL EXAM      Lumbar spine:  No rash, ecchymosis, or gross obliquity. No fasciculations. No focal atrophy is noted. No tenderness to palpation at the sciatic notch. SI joints non-tender. Trochanters non tender. Sensation grossly intact to light touch. MOTOR:       Hip Flex  Quads Hamstrings Ankle DF EHL Ankle PF   Right +4/5 +4/5 +4/5 +4/5 +4/5 +4/5   Left +4/5 +4/5 +4/5 +4/5 +4/5 +4/5       Straight Leg raise negative. Ambulation with single point cane. FWB. Written by Jeanette Trammell, as dictated by Khadijah Delarosa MD.    I, Dr. Khadijah Delarosa MD, confirm that all documentation is accurate. Mr. Yudy Roberts may have a reminder for a \"due or due soon\" health maintenance. I have asked that he contact his primary care provider for follow-up on this health maintenance.

## 2017-07-05 NOTE — PATIENT INSTRUCTIONS
Hamstring Strain: Rehab Exercises  Your Care Instructions  Here are some examples of typical rehabilitation exercises for your condition. Start each exercise slowly. Ease off the exercise if you start to have pain. Your doctor or physical therapist will tell you when you can start these exercises and which ones will work best for you. How to do the exercises  Hamstring set (heel dig)    1. Sit with your affected leg bent. Your good leg should be straight and supported on the floor. 2. Tighten the muscles on the back of your bent leg (hamstring) by pressing your heel into the floor. 3. Hold for about 6 seconds, and then rest for up to 10 seconds. 4. Repeat 8 to 12 times. Hamstring curl    1. Lie on your stomach with your knees straight. Place a pillow under your stomach. If your kneecap is uncomfortable, roll up a washcloth and put it under your leg just above your kneecap. 2. Lift the foot of your affected leg by bending your knee so that you bring your foot up toward your buttock. If this motion hurts, try it without bending your knee quite as far. This may help you avoid any painful motion. 3. Slowly move your leg up and down. 4. Repeat 8 to 12 times. When you can do this exercise with ease and no pain, add some resistance. To do this:  · Tie the ends of an exercise band together to form a loop. Attach one end of the loop to a secure object or shut a door on it to hold it in place. (Or you can have someone hold one end of the loop to provide resistance.)  · Loop the other end of the exercise band around the lower part of your affected leg. · Repeat steps 1 through 4, slowly pulling back on the exercise band with your leg. Hip extension    1. Stand facing a wall with your hands on the wall at about chest level. 2. Keeping the knee of your affected leg straight, kick that leg straight back behind you. 3. Relax, and lower your leg back to the starting position. 4. Repeat 8 to 12 times.   When you can do this exercise with ease and no pain, add some resistance. To do this:  · Tie the ends of an exercise band together to form a loop. Attach one end of the loop to a secure object or shut a door on it to hold it in place. (Or you can have someone hold one end of the loop to provide resistance.)  · Loop the other end of the exercise band around the lower part of your affected leg. · Repeat steps 1 through 4, slowly pulling back on the exercise band with your leg. Hamstring wall stretch    1. Lie on your back in a doorway, with your good leg through the open door. 2. Slide your affected leg up the wall to straighten your knee. You should feel a gentle stretch down the back of your leg. ¨ Do not arch your back. ¨ Do not bend either knee. ¨ Keep one heel touching the floor and the other heel touching the wall. Do not point your toes. 3. Hold the stretch for at least 1 minute to begin. Then try to lengthen the time you hold the stretch to as long as 6 minutes. 4. Repeat 2 to 4 times. If you do not have a place to do this exercise in a doorway, there is another way to do it:  1. Lie on your back, and bend the knee of your affected leg. 2. Loop a towel under the ball and toes of that foot, and hold the ends of the towel in your hands. 3. Straighten your knee, and slowly pull back on the towel. You should feel a gentle stretch down the back of your leg. 4. Hold the stretch for 15 to 30 seconds. Or even better, hold the stretch for 1 minute if you can. 5. Repeat 2 to 4 times. Calf stretch    1. Stand facing a wall with your hands on the wall at about eye level. Put your affected leg about a step behind your other leg. 2. Keeping your back leg straight and your back heel on the floor, bend your front knee and gently bring your hip and chest toward the wall until you feel a stretch in the calf of your back leg. 3. Hold the stretch for 15 to 30 seconds. 4. Repeat 2 to 4 times.   5. Repeat steps 1 through 4, but this time keep your back knee bent. Single-leg balance    1. Stand on a flat surface with your arms stretched out to your sides like you are making the letter \"T. \" Then lift your good leg off the floor, bending it at the knee. If you are not steady on your feet, use one hand to hold on to a chair, counter, or wall. 2. Standing on your affected leg, keep that knee straight. Try to balance on that leg for up to 30 seconds. Then rest for up to 10 seconds. 3. Repeat 6 to 8 times. 4. When you can balance on your affected leg for 30 seconds with your eyes open, try to balance on it with your eyes closed. When you can do this exercise with your eyes closed for 30 seconds and with ease and no pain, try standing on a pillow or piece of foam, and repeat steps 1 through 4. Follow-up care is a key part of your treatment and safety. Be sure to make and go to all appointments, and call your doctor if you are having problems. It's also a good idea to know your test results and keep a list of the medicines you take. Where can you learn more? Go to http://angelica-vishnu.info/. Enter [phone] in the search box to learn more about \"Hamstring Strain: Rehab Exercises. \"  Current as of: March 21, 2017  Content Version: 11.3  © 6877-0604 [employer]. Care instructions adapted under license by [employer] (which disclaims liability or warranty for this information). If you have questions about a medical condition or this instruction, always ask your healthcare professional. [place] [address] any warranty or liability for your use of this information.

## 2017-09-20 NOTE — PATIENT INSTRUCTIONS
C[employer]ric Activation    Thank you for requesting access to [employer]. Please follow the instructions below to securely access and download your online medical record. [employer]ric allows you to send messages to your doctor, view your test results, renew your prescriptions, schedule appointments, and more. How Do I Sign Up? 1. In your internet browser, go to [email or web address]  2. Click on the First Time User? Click Here link in the Sign In box. You will be redirect to the New Member Sign Up page. 3. Enter your C[employer]ric Access Code exactly as it appears below. You will not need to use this code after youve completed the sign-up process. If you do not sign up before the expiration date, you must request a new code. C[employer]ric Access Code: [email or web address]  Expires: 2017 11:33 AM (This is the date your C[employer]ric access code will )    4. Enter the last four digits of your Social Security Number (xxxx) and Date of Birth (mm/dd/yyyy) as indicated and click Submit. You will be taken to the next sign-up page. 5. Create a Cintric ID. This will be your Cintric login ID and cannot be changed, so think of one that is secure and easy to remember. 6. Create a C[employer]ric password. You can change your password at any time. 7. Enter your Password Reset Question and Answer. This can be used at a later time if you forget your password. 8. Enter your e-mail address. You will receive e-mail notification when new information is available in [address]. 9. Click Sign Up. You can now view and download portions of your medical record. 10. Click the Download Summary menu link to download a portable copy of your medical information. Additional Information    If you have questions, please visit the Frequently Asked Questions section of the C[employer]ric website at [email or web address] Remember, [employer]ric is NOT to be used for urgent needs. For medical emergencies, dial 911.          Lumbar Spinal Stenosis: Care Instructions  Your Care Instructions    Stenosis in the spine is a narrowing of the canal that is around the spinal cord and nerve roots in your back. It can happen as part of aging. Sometimes bone and other tissue grow into this canal and press on the nerves that branch out from the spinal cord. This can cause pain, numbness, and weakness. When it happens in the lower part of your back, it is called lumbar spinal stenosis. It can cause problems in the legs, feet, and rear end (buttocks). You may be able to get relief from the symptoms of spinal stenosis by taking pain medicine. Your doctor may suggest physical therapy and exercises to keep your spine strong and flexible. Some people try steroid shots to reduce swelling. If pain and numbness in your legs are still so bad that you cannot do your normal activities, you may need surgery. Follow-up care is a key part of your treatment and safety. Be sure to make and go to all appointments, and call your doctor if you are having problems. It's also a good idea to know your test results and keep a list of the medicines you take. How can you care for yourself at home? · Take an over-the-counter pain medicine, such as acetaminophen (Tylenol), ibuprofen (Advil, Motrin), or naproxen (Aleve). Be safe with medicines. Read and follow all instructions on the label. · Do not take two or more pain medicines at the same time unless the doctor told you to. Many pain medicines have acetaminophen, which is Tylenol. Too much acetaminophen (Tylenol) can be harmful. · Stay at a healthy weight. Being overweight puts extra strain on your spine. · Change positions often when you sit or stand. This can ease pain. It may also reduce pressure on the spinal cord and its nerves. · Avoid doing things that make your symptoms worse. Walking downhill and standing for a long time may cause pain. · Stretch and strengthen your back muscles as your doctor or physical therapist recommends.  If your doctor says it is okay to do them, these exercises may help. ¨ Lie on your back with your knees bent. Gently pull one bent knee to your chest. Put that foot back on the floor, and then pull the other knee to your chest.  ¨ Do pelvic tilts. Lie on your back with your knees bent. Tighten your stomach muscles. Pull your belly button (navel) in and up toward your ribs. You should feel like your back is pressing to the floor and your hips and pelvis are slightly lifting off the floor. Hold for 6 seconds while breathing smoothly. ¨ Stand with your back flat against a wall. Slowly slide down until your knees are slightly bent. Hold for 10 seconds, then slide back up the wall. · Remove or change anything in your house that may cause you to fall. Keep walkways clear of clutter, electrical cords, and throw rugs. When should you call for help? Call 911 anytime you think you may need emergency care. For example, call if:  · You are unable to move a leg at all. Call your doctor now or seek immediate medical care if:  · You have new or worse symptoms in your legs, belly, or buttocks. Symptoms may include:  ¨ Numbness or tingling. ¨ Weakness. ¨ Pain. · You lose bladder or bowel control. Watch closely for changes in your health, and be sure to contact your doctor if:  · You are not getting better as expected. Where can you learn more? Go to http://angelica-vishnu.info/. Jose Wilkinson in the search box to learn more about \"Lumbar Spinal Stenosis: Care Instructions. \"  Current as of: March 21, 2017  Content Version: 11.3  © 9801-5902 [employer]. Care instructions adapted under license by [employer] (which disclaims liability or warranty for this information). If you have questions about a medical condition or this instruction, always ask your healthcare professional. [address] any warranty or liability for your use of this information.

## 2017-09-20 NOTE — MR AVS SNAPSHOT
Visit Information Date & Time Provider Department Dept. Phone Encounter #  
 9/20/2017 11:30 AM Milton Coppola MD [facility] [phone] [id number] Follow-up Instructions Return 3-4 months. Your Appointments 1/16/2018  2:00 PM  
Nurse Visit with [facility] NURSE [facility] ([facility]) Appt Note: eligard 01/16/18-01/22/18  
 [address] 
[address] [facility] [address]  
[address] [facility] [address] [facility] [address]  
  
    
  
 9/28/2017  2:00 PM  
Any with Anna Simeon MD  
[facility] ([facility]) Appt Note: 4m fu  
 [address] 
[address] [facility] [address]  
[address] [facility] [address] [facility] [address] Upcoming Health Maintenance Date Due DTaP/Tdap/Td series (1 - Tdap) 2/28/1951 ZOSTER VACCINE AGE 60> 12/28/1989 GLAUCOMA SCREENING Q2Y 2/28/1995 Pneumococcal 65+ High/Highest Risk (1 of 2 - PCV13) 2/28/1995 MEDICARE YEARLY EXAM 2/28/1995 INFLUENZA AGE 9 TO ADULT 8/1/2017 Allergies as of 9/20/2017  Review Complete On: 9/20/2017 By: Milton Coppola MD  
  
 Severity Noted Reaction Type Reactions Sulfur Medium 04/10/2013   Side Effect Rash Cephalexin  07/07/2008    Swelling Pt denies this allergy Colchicine  08/22/2012    Swelling Tramadol Low 01/21/2016    Nausea Only Current Immunizations  Never Reviewed No immunizations on file. Not reviewed this visit You Were Diagnosed With   
  
 Codes Comments Lumbar stenosis    -  Primary ICD-10-CM: M48.06 
ICD-9-CM: 724.02 Vitals BP Pulse Temp Resp Weight(growth percentile) BMI  
 165/66 (BP 1 Location: Left arm, BP Patient Position: Sitting) 86 98 °F (36.7 °C) (Oral) 16 170 lb 3.2 oz (77.2 kg) 24.42 kg/m2 Smoking Status Former Smoker BMI and BSA Data Body Mass Index Body Surface Area  
 24.42 kg/m 2 1.95 m 2 Preferred Pharmacy Pharmacy Name Phone [id number]- [facility]tt [facility] [phone] Your Updated Medication List  
  
   
This list is accurate as of: 9/20/17 12:12 PM.  Always use your most recent med list.  
  
  
  
  
 albuterol 90 mcg/actuation inhaler Commonly known as:  PROVENTIL HFA, VENTOLIN HFA, PROAIR HFA Take 2 Puffs by inhalation every four (4) hours as needed for Wheezing. amLODIPine 10 mg tablet Commonly known as:  Shannon Townsend Take 10 mg by mouth daily. aspirin delayed-release 81 mg tablet Take 81 mg by mouth daily. COMBIGAN 0.2-0.5 % Drop ophthalmic solution Generic drug:  brimonidine-timolol Administer 1 Drop to both eyes every twelve (12) hours. fluticasone 50 mcg/actuation nasal spray Commonly known as:  Venu Pura SPRAY 1 SPRAY INTO EACH NOSTRIL DAILY  
  
 furosemide 20 mg tablet Commonly known as:  LASIX TAKE 1 TABLET BY MOUTH DAILY HYDROcodone-acetaminophen  mg tablet Commonly known as:  Astrid Pare Take 1 Tab by mouth two (2) times daily as needed for Pain. Max Daily Amount: 2 Tabs. Indications: Pain  
  
 latanoprost 0.005 % ophthalmic solution Commonly known as:  Reed Seo Administer 1 Drop to both eyes nightly. mirtazapine 15 mg tablet Commonly known as:  REMERON  
TAKE 1 TABLET BY MOUTH AT BEDTIME. PLEASE CALL MD IF SIDE EFFECTS OCCUR  
  
 omeprazole 20 mg capsule Commonly known as:  PRILOSEC Take 20 mg by mouth daily. senna 8.6 mg tablet Commonly known as:  [employer] Take 1 Tab by mouth daily. Prescriptions Printed Refills HYDROcodone-acetaminophen (NORCO)  mg tablet 0 Sig: Take 1 Tab by mouth two (2) times daily as needed for Pain. Max Daily Amount: 2 Tabs. Indications: Pain Class: Print Route: Oral  
  
Follow-up Instructions Return 3-4 months. Patient Instructions MyChart Activation Thank you for requesting access to [employer]. Please follow the instructions below to securely access and download your online medical record. Mayomi allows you to send messages to your doctor, view your test results, renew your prescriptions, schedule appointments, and more. How Do I Sign Up? 1. In your internet browser, go to [email or web address] 
2. Click on the First Time User? Click Here link in the Sign In box. You will be redirect to the New Member Sign Up page. 3. Enter your Mayomi Access Code exactly as it appears below. You will not need to use this code after youve completed the sign-up process. If you do not sign up before the expiration date, you must request a new code. Mayomi Access Code: [email or web address] Expires: 2017 11:33 AM (This is the date your Mayomi access code will ) 4. Enter the last four digits of your Social Security Number (xxxx) and Date of Birth (mm/dd/yyyy) as indicated and click Submit. You will be taken to the next sign-up page. 5. Create a Mayomi ID. This will be your Mayomi login ID and cannot be changed, so think of one that is secure and easy to remember. 6. Create a Mayomi password. You can change your password at any time. 7. Enter your Password Reset Question and Answer. This can be used at a later time if you forget your password. 8. Enter your e-mail address. You will receive e-mail notification when new information is available in [address]. 9. Click Sign Up. You can now view and download portions of your medical record. 10. Click the Download Summary menu link to download a portable copy of your medical information. Additional Information If you have questions, please visit the Frequently Asked Questions section of the Mayomi website at [email or web address] Remember, Mayomi is NOT to be used for urgent needs. For medical emergencies, dial 911. Lumbar Spinal Stenosis: Care Instructions Your Care Instructions Stenosis in the spine is a narrowing of the canal that is around the spinal cord and nerve roots in your back. It can happen as part of aging. Sometimes bone and other tissue grow into this canal and press on the nerves that branch out from the spinal cord. This can cause pain, numbness, and weakness. When it happens in the lower part of your back, it is called lumbar spinal stenosis. It can cause problems in the legs, feet, and rear end (buttocks). You may be able to get relief from the symptoms of spinal stenosis by taking pain medicine. Your doctor may suggest physical therapy and exercises to keep your spine strong and flexible. Some people try steroid shots to reduce swelling. If pain and numbness in your legs are still so bad that you cannot do your normal activities, you may need surgery. Follow-up care is a key part of your treatment and safety. Be sure to make and go to all appointments, and call your doctor if you are having problems. It's also a good idea to know your test results and keep a list of the medicines you take. How can you care for yourself at home? · Take an over-the-counter pain medicine, such as acetaminophen (Tylenol), ibuprofen (Advil, Motrin), or naproxen (Aleve). Be safe with medicines. Read and follow all instructions on the label. · Do not take two or more pain medicines at the same time unless the doctor told you to. Many pain medicines have acetaminophen, which is Tylenol. Too much acetaminophen (Tylenol) can be harmful. · Stay at a healthy weight. Being overweight puts extra strain on your spine. · Change positions often when you sit or stand. This can ease pain. It may also reduce pressure on the spinal cord and its nerves. · Avoid doing things that make your symptoms worse. Walking downhill and standing for a long time may cause pain.  
· Stretch and strengthen your back muscles as your doctor or physical therapist recommends. If your doctor says it is okay to do them, these exercises may help. ¨ Lie on your back with your knees bent. Gently pull one bent knee to your chest. Put that foot back on the floor, and then pull the other knee to your chest. 
¨ Do pelvic tilts. Lie on your back with your knees bent. Tighten your stomach muscles. Pull your belly button (navel) in and up toward your ribs. You should feel like your back is pressing to the floor and your hips and pelvis are slightly lifting off the floor. Hold for 6 seconds while breathing smoothly. ¨ Stand with your back flat against a wall. Slowly slide down until your knees are slightly bent. Hold for 10 seconds, then slide back up the wall. · Remove or change anything in your house that may cause you to fall. Keep walkways clear of clutter, electrical cords, and throw rugs. When should you call for help? Call 911 anytime you think you may need emergency care. For example, call if: 
· You are unable to move a leg at all. Call your doctor now or seek immediate medical care if: 
· You have new or worse symptoms in your legs, belly, or buttocks. Symptoms may include: ¨ Numbness or tingling. ¨ Weakness. ¨ Pain. · You lose bladder or bowel control. Watch closely for changes in your health, and be sure to contact your doctor if: 
· You are not getting better as expected. Where can you learn more? Go to http://angelica-vishnu.info/. David Humphrey in the search box to learn more about \"Lumbar Spinal Stenosis: Care Instructions. \" Current as of: March 21, 2017 Content Version: 11.3 © 5935-6824 [employer] Care instructions adapted under license by [employer] (which disclaims liability or warranty for this information).  If you have questions about a medical condition or this instruction, always ask your healthcare professional. [place] [address] any warranty or liability for your use of this information. Introducing [facility]! [employer] introduces [employer] patient portal. Now you can access parts of your medical record, email your doctor's office, and request medication refills online. 1. In your internet browser, go to [email or web address] 2. Click on the First Time User? Click Here link in the Sign In box. You will see the New Member Sign Up page. 3. Enter your [employer] Access Code exactly as it appears below. You will not need to use this code after youve completed the sign-up process. If you do not sign up before the expiration date, you must request a new code. · [employer] Access Code: [email or web address] Expires: 12/19/2017 11:33 AM 
 
4. Enter the last four digits of your Social Security Number (xxxx) and Date of Birth (mm/dd/yyyy) as indicated and click Submit. You will be taken to the next sign-up page. 5. Create a [employer] ID. This will be your [employer] login ID and cannot be changed, so think of one that is secure and easy to remember. 6. Create a [employer] password. You can change your password at any time. 7. Enter your Password Reset Question and Answer. This can be used at a later time if you forget your password. 8. Enter your e-mail address. You will receive e-mail notification when new information is available in [address] 9. Click Sign Up. You can now view and download portions of your medical record. 10. Click the Download Summary menu link to download a portable copy of your medical information. If you have questions, please visit the Frequently Asked Questions section of the [employer] website. Remember, [employer] is NOT to be used for urgent needs. For medical emergencies, dial 911. Now available from your iPhone and Android! Please provide this summary of care documentation to your next provider. Your primary care clinician is listed as Anitha Guan.  If you have any questions after today's visit, please call [phone].

## 2017-09-22 NOTE — PROGRESS NOTES
Terrell [facility] [address]  [address], [address]  [place] [address]  Phone: [phone]  Fax: [phone]        Leonila Gary  : 1930  PCP: Aga Munguia MD    PROGRESS NOTE      ASSESSMENT AND PLAN    Diagnoses and all orders for this visit:    1. Lumbar stenosis  -     HYDROcodone-acetaminophen (NORCO)  mg tablet; Take 1 Tab by mouth two (2) times daily as needed for Pain. Max Daily Amount: 2 Tabs. Indications: Pain       1. Advised to continue HEP. 2.  Will call when ADAMARIS needed. Concerned about pain increasing in winter. 3.  Uses rare Norco    Follow-up Disposition:  Return 3-4 months. HISTORY OF PRESENT ILLNESS  Leah Dorman. is a 80 y.o. male. Pt presents to the office for a f/u visit for lumbar stenosis      Pt reportsLBP pain has been manageable, no LE paresthesias. Pt denies weakness in LEs. Pt denies saddle paresthesias. Pt states he has been using no medication. He had last ADAMARIS several months ago, reports flare of LBP before improvement. Does not feel like he needs another ADAMARIS yet. Previously annual ADAMARIS, now every few months. Denies persistent fevers, chills, weight changes, neurogenic bowel or bladder symptoms. Pt denies recent ED visits or hospitalizations. Stays active w/walking. Uses rare Norco, only for severe pain.  reviewed, last fill, several months ago. Pain Assessment  2017   Location of Pain Back   Location Modifiers -   Severity of Pain 3   Quality of Pain Aching; Throbbing   Quality of Pain Comment -   Duration of Pain -   Frequency of Pain Constant   Aggravating Factors Walking;Standing   Aggravating Factors Comment -   Limiting Behavior -   Relieving Factors (No Data)   Relieving Factors Comment epidural helped a lot, norco prn   Result of Injury -   Work-Related Injury -       PAST MEDICAL HISTORY   Past Medical History:   Diagnosis Date    Arthritis     Asthma     Back pain     Edema     Glaucoma  Gout     History of phimosis 06/20/2008    S/p circumcision     Hypertension     Low back pain radiating to both legs     Prostate cancer ([facility])     [id number], s/p RP f/b XRT in 1992 due to PSA recurrence >20 years ago    Renal mass     Sciatica     Spinal stenosis     Thromboembolus ([facility]) 2014    Thyroid disease        Past Surgical History:   Procedure Laterality Date    HX CIRCUMCISION  06/20/2008    HX KNEE REPLACEMENT      HX OTHER SURGICAL  10/92    [facility]GA-Laser surgery    HX OTHER SURGICAL  8/92    Z_PR REMV prostate,retropub, radical   .      MEDICATIONS    Current Outpatient Prescriptions   Medication Sig Dispense Refill    HYDROcodone-acetaminophen (NORCO)  mg tablet Take 1 Tab by mouth two (2) times daily as needed for Pain. Max Daily Amount: 2 Tabs. Indications: Pain 30 Tab 0    omeprazole (PRILOSEC) 20 mg capsule Take 20 mg by mouth daily.  aspirin delayed-release 81 mg tablet Take 81 mg by mouth daily.  fluticasone (FLONASE) 50 mcg/actuation nasal spray SPRAY 1 SPRAY INTO EACH NOSTRIL DAILY  1    furosemide (LASIX) 20 mg tablet TAKE 1 TABLET BY MOUTH DAILY  1    mirtazapine (REMERON) 15 mg tablet TAKE 1 TABLET BY MOUTH AT BEDTIME. PLEASE CALL MD IF SIDE EFFECTS OCCUR  1    senna (SENOKOT) 8.6 mg tablet Take 1 Tab by mouth daily. 14 Tab 0    albuterol (PROVENTIL HFA, VENTOLIN HFA, PROAIR HFA) 90 mcg/actuation inhaler Take 2 Puffs by inhalation every four (4) hours as needed for Wheezing. 1 Inhaler 0    COMBIGAN 0.2-0.5 % drop ophthalmic solution Administer 1 Drop to both eyes every twelve (12) hours.  latanoprost (XALATAN) 0.005 % ophthalmic solution Administer 1 Drop to both eyes nightly.  amLODIPine (NORVASC) 10 mg tablet Take 10 mg by mouth daily.           ALLERGIES  Allergies   Allergen Reactions    Sulfur Rash    Cephalexin Swelling     Pt denies this allergy      Colchicine Swelling    Tramadol Nausea Only          SOCIAL HISTORY    Social History Social History    Marital status: SINGLE     Spouse name: N/A    Number of children: N/A    Years of education: N/A     Occupational History    Not on file. Social History Main Topics    Smoking status: Former Smoker    Smokeless tobacco: Never Used      Comment: Quit in 1953    Alcohol use No    Drug use: No    Sexual activity: Not on file     Other Topics Concern    Not on file     Social History Narrative       FAMILY HISTORY  Family History   Problem Relation Age of Onset    Arthritis-osteo Father     Asthma Other        REVIEW OF SYSTEMS  ROS. Denies CP/SOB/falls/N/V    PHYSICAL EXAMINATION  Visit Vitals    /66 (BP 1 Location: Left arm, BP Patient Position: Sitting)    Pulse 86    Temp 98 °F (36.7 °C) (Oral)    Resp 16    Wt 170 lb 3.2 oz (77.2 kg)    BMI 24.42 kg/m2         Accompanied by self. Constitutional:  Well developed, well nourished elderly gentleman, in no acute distress. Psychiatric: Affect and mood are appropriate. Integumentary: No rashes or abrasions noted on exposed areas. Cardiovascular/Peripheral Vascular: Intact l pulses. No peripheral edema is noted. Lymphatic:  No evidence of lymphedema. No cervical lymphadenopathy. SPINE/MUSCULOSKELETAL EXAM      Lumbar spine:  No rash, ecchymosis, or gross obliquity. No fasciculations. No focal atrophy is noted. No lumbar TTP. No tenderness to palpation at the sciatic notch. SI joints non-tender. Trochanters non tender. Sensation grossly intact to light touch. MOTOR:         Hip Flex  Quads Hamstrings Ankle DF EHL Ankle PF   Right +4/5 +4/5 +4/5 +4/5 +4/5 +4/5   Left +4/5 +4/5 +4/5 +4/5 +4/5 +4/5       No hyper reflexia. Straight Leg raise -. Ambulation with single point cane. FWB. I, Dr. Soo Castorena MD, confirm that all documentation is accurate. Mr. Leoncio Gage may have a reminder for a \"due or due soon\" health maintenance.  I have asked that he contact his primary care provider for follow-up on this health maintenance.

## 2017-12-08 NOTE — PATIENT INSTRUCTIONS
Shoulder Arthritis: Exercises  Your Care Instructions  Here are some examples of typical rehabilitation exercises for your condition. Start each exercise slowly. Ease off the exercise if you start to have pain. Your doctor or physical therapist will tell you when you can start these exercises and which ones will work best for you. How to do the exercises  Shoulder flexion (lying down)    To make a wand for this exercise, use a piece of PVC pipe or a broom handle with the broom removed. Make the wand about a foot wider than your shoulders. 1. Lie on your back, holding a wand with both hands. Your palms should face down as you hold the wand. 2. Keeping your elbows straight, slowly raise your arms over your head. Raise them until you feel a stretch in your shoulders, upper back, and chest.  3. Hold for 15 to 30 seconds. 4. Repeat 2 to 4 times. Shoulder rotation (lying down)    To make a wand for this exercise, use a piece of PVC pipe or a broom handle with the broom removed. Make the wand about a foot wider than your shoulders. 1. Lie on your back. Hold a wand with both hands with your elbows bent and palms up. 2. Keep your elbows close to your body, and move the wand across your body toward the sore arm. 3. Hold for 8 to 12 seconds. 4. Repeat 2 to 4 times. Shoulder internal rotation with towel    1. Hold a towel above and behind your head with the arm that is not sore. 2. With your sore arm, reach behind your back and grasp the towel. 3. With the arm above your head, pull the towel upward. Do this until you feel a stretch on the front and outside of your sore shoulder. 4. Hold 15 to 30 seconds. 5. Repeat 2 to 4 times. Shoulder blade squeeze    1. Stand with your arms at your sides, and squeeze your shoulder blades together. Do not raise your shoulders up as you squeeze. 2. Hold 6 seconds. 3. Repeat 8 to 12 times.   Resisted rows    For this exercise, you will need elastic exercise material, such as surgical tubing or Thera-Band. 1. Put the band around a solid object at about waist level. (A bedpost will work well.) Each hand should hold an end of the band. 2. With your elbows at your sides and bent to 90 degrees, pull the band back. Your shoulder blades should move toward each other. Return to the starting position. 3. Repeat 8 to 12 times. External rotator strengthening exercise    1. Start by tying a piece of elastic exercise material to a doorknob. You can use surgical tubing or Thera-Band. (You may also hold one end of the band in each hand.)  2. Stand or sit with your shoulder relaxed and your elbow bent 90 degrees. Your upper arm should rest comfortably against your side. Squeeze a rolled towel between your elbow and your body for comfort. This will help keep your arm at your side. 3. Hold one end of the elastic band with the hand of the painful arm. 4. Start with your forearm across your belly. Slowly rotate the forearm out away from your body. Keep your elbow and upper arm tucked against the towel roll or the side of your body until you begin to feel tightness in your shoulder. Slowly move your arm back to where you started. 5. Repeat 8 to 12 times. Internal rotator strengthening exercise    1. Start by tying a piece of elastic exercise material to a doorknob. You can use surgical tubing or Thera-Band. 2. Stand or sit with your shoulder relaxed and your elbow bent 90 degrees. Your upper arm should rest comfortably against your side. Squeeze a rolled towel between your elbow and your body for comfort. This will help keep your arm at your side. 3. Hold one end of the elastic band in the hand of the painful arm. 4. Slowly rotate your forearm toward your body until it touches your belly. Slowly move it back to where you started. 5. Keep your elbow and upper arm firmly tucked against the towel roll or at your side. 6. Repeat 8 to 12 times.   Pendulum swing    If you have pain in your back, do not do this exercise. 1. Hold on to a table or the back of a chair with your good arm. Then bend forward a little and let your sore arm hang straight down. This exercise does not use the arm muscles. Rather, use your legs and your hips to create movement that makes your arm swing freely. 2. Use the movement from your hips and legs to guide the slightly swinging arm back and forth like a pendulum (or elephant trunk). Then guide it in circles that start small (about the size of a dinner plate). Make the circles a bit larger each day, as your pain allows. 3. Do this exercise for 5 minutes, 5 to 7 times each day. 4. As you have less pain, try bending over a little farther to do this exercise. This will increase the amount of movement at your shoulder. Follow-up care is a key part of your treatment and safety. Be sure to make and go to all appointments, and call your doctor if you are having problems. It's also a good idea to know your test results and keep a list of the medicines you take. Where can you learn more? Go to http://angelica-vishnu.info/. Enter H562 in the search box to learn more about \"Shoulder Arthritis: Exercises. \"  Current as of: March 21, 2017  Content Version: 11.4  © 1151-4829 [employer]. Care instructions adapted under license by [employer] (which disclaims liability or warranty for this information). If you have questions about a medical condition or this instruction, always ask your healthcare professional. [place] [address] any warranty or liability for your use of this information. Joint Injections: Care Instructions  Your Care Instructions  Joint injections are shots into a joint, such as the knee. They may be used to put in medicines, such as pain relievers. Or they can be used to take out fluid. Sometimes the fluid is tested in a lab. This can help find the cause of a joint problem.   A corticosteroid, or steroid, shot is used to reduce inflammation in tendons or joints. It is often used to treat problems such as arthritis, tendinitis, and bursitis. Steroids can be injected directly into a painful, inflamed joint. They can also help reduce inflammation of a bursa. A bursa is a sac of fluid. It cushions and lubricates areas where tendons, ligaments, skin, muscles, or bones rub against each other. A steroid shot can sometimes help with short-term pain relief when other treatments haven't worked. If steroid shots help, pain may improve for weeks or months. Follow-up care is a key part of your treatment and safety. Be sure to make and go to all appointments, and call your doctor if you are having problems. It's also a good idea to know your test results and keep a list of the medicines you take. How can you care for yourself at home? · Put ice or a cold pack on the area for 10 to 20 minutes at a time. Put a thin cloth between the ice and your skin. · Take anti-inflammatory medicines to reduce pain, swelling, or inflammation. These include ibuprofen (Advil, Motrin) and naproxen (Aleve). Read and follow all instructions on the label. · Avoid strenuous activities for several days, especially those that put stress on the area where you got the shot. · If you have dressings over the area, keep them clean and dry. You may remove them when your doctor tells you to. When should you call for help? Call your doctor now or seek immediate medical care if:  ? · You have signs of infection, such as:  ¨ Increased pain, swelling, warmth, or redness. ¨ Red streaks leading from the site. ¨ Pus draining from the site. ¨ A fever. ? Watch closely for changes in your health, and be sure to contact your doctor if you have any problems. Where can you learn more? Go to http://angelica-vishnu.info/. Enter N616 in the search box to learn more about \"Joint Injections: Care Instructions. \"  Current as of: March 21, 2017  Content Version: 11.4  © 6034-8063 [employer]orporated. Care instructions adapted under license by [employer] (which disclaims liability or warranty for this information). If you have questions about a medical condition or this instruction, always ask your healthcare professional. [address] any warranty or liability for your use of this information.

## 2017-12-08 NOTE — MR AVS SNAPSHOT
Visit Information Date & Time Provider Department Dept. Phone Encounter #  
 12/8/2017 11:20 AM Lalita Guajardo MD [address] [facility] [phone] [id number] Follow-up Instructions Return if symptoms worsen or fail to improve. Your Appointments 1/10/2018  1:15 PM  
Follow Up with Emeli Bustos MD  
[facility] ([address] [place] [address]) Appt Note: 4 mo f/u  
 [address] [place] [address]  
[phone]  
  
   
 [address] [place]  
  
    
 1/16/2018  2:00 PM  
[facility] Visit with [facility] ([address] [place] [address]) Appt Note: eligard 01/16/18-01/22/18  
 [address] 
[address] [place] [address]  
[phone]  
  
   
 [address] [facility] [address] [place] [address]  
  
    
 3/9/2018  9:30 AM  
[facility] Visit with [facility] ([address] [place] [address]) Appt Note: PSA Testosterone [address] [place] [address]  
[phone]  
  
    
  
 3/22/2018  1:45 PM  
Any with Tracey Agustin MD  
[facility] ([address] [place] [address]) Appt Note: Return in about 6 months (around 3/28/2018) for follow up, PSA and testosterone 1 week prior [address] [place] [address]  
[address] [facility] [address] [facility] [address] [place] [address] Upcoming Health Maintenance Date Due DTaP/Tdap/Td series (1 - Tdap) 2/28/1951 ZOSTER VACCINE AGE 60> 12/28/1989 GLAUCOMA SCREENING Q2Y 2/28/1995 Pneumococcal 65+ High/Highest Risk (1 of 2 - PCV13) 2/28/1995 MEDICARE YEARLY EXAM 2/28/1995 Influenza Age 5 to Adult 8/1/2017 Allergies as of 12/8/2017  Review Complete On: 12/8/2017 By: Lalita Guajardo MD  
  
 Severity Noted Reaction Type Reactions Sulfur Medium 04/10/2013   Side Effect Rash Cephalexin  07/07/2008    Swelling Pt denies this allergy Colchicine  08/22/2012    Swelling Tramadol Low 01/21/2016    Nausea Only Current Immunizations  Never Reviewed No immunizations on file. Not reviewed this visit You Were Diagnosed With   
  
 Codes Comments Primary osteoarthritis of right shoulder    -  Primary ICD-10-CM: M19.011 
ICD-9-CM: 715.11 Chronic right shoulder pain     ICD-10-CM: M25.511, G89.29 ICD-9-CM: 719.41, 338.29 Rotator cuff arthropathy, right     ICD-10-CM: R87.012 ICD-9-CM: 716.81 Arthrosis of right acromioclavicular joint     ICD-10-CM: M19.011 
ICD-9-CM: 715.91 Osteopenia determined by x-ray     ICD-10-CM: M85.80 ICD-9-CM: 733.90 Vitals BP Pulse Temp Resp Height(growth percentile) Weight(growth percentile) 171/63 72 95.1 °F (35.1 °C) (Oral) 18 5' 10\" (1.778 m) 171 lb 12.8 oz (77.9 kg) SpO2 BMI Smoking Status 100% 24.65 kg/m2 Former Smoker BMI and BSA Data Body Mass Index Body Surface Area  
 24.65 kg/m 2 1.96 m 2 Preferred Pharmacy Pharmacy Name Phone [id number] [phone] Your Updated Medication List  
  
   
This list is accurate as of: 12/8/17 12:43 PM.  Always use your most recent med list.  
  
  
  
  
 albuterol 90 mcg/actuation inhaler Commonly known as:  PROVENTIL HFA, VENTOLIN HFA, PROAIR HFA Take 2 Puffs by inhalation every four (4) hours as needed for Wheezing. amLODIPine 10 mg tablet Commonly known as:  [place] Conine Take 10 mg by mouth daily. aspirin delayed-release 81 mg tablet Take 81 mg by mouth daily. betamethasone 6 mg/mL injection Commonly known as:  CELESTONE SOLUSPAN  
1 mL by Intra artICUlar route once for 1 dose. bupivacaine (PF) 0.25 % (2.5 mg/mL) injection Commonly known as:  MARCAINE (PF)  
4 mL by Intra artICUlar route once for 1 dose. COMBIGAN 0.2-0.5 % Drop ophthalmic solution Generic drug:  brimonidine-timolol Administer 1 Drop to both eyes every twelve (12) hours. docusate sodium 100 mg capsule Commonly known as:  COLACE  
TAKE 1 CAPSULE BY MOUTH TWICE DAILY FOR CONSTIPATION, HOLD FOR DIARRHEA. fluticasone 50 mcg/actuation nasal spray Commonly known as:  [place] Peel SPRAY 1 SPRAY INTO EACH NOSTRIL DAILY  
  
 furosemide 20 mg tablet Commonly known as:  LASIX TAKE 1 TABLET BY MOUTH DAILY HYDROcodone-acetaminophen  mg tablet Commonly known as:  Emely Paez Take 1 Tab by mouth two (2) times daily as needed for Pain. Max Daily Amount: 2 Tabs. Indications: Pain  
  
 latanoprost 0.005 % ophthalmic solution Commonly known as:  [place] Ridgel Administer 1 Drop to both eyes nightly. mirabegron ER 50 mg ER tablet Commonly known as:  MYRBETRIQ Take 1 Tab by mouth daily. mirtazapine 15 mg tablet Commonly known as:  REMERON  
TAKE 1 TABLET BY MOUTH AT BEDTIME. PLEASE CALL MD IF SIDE EFFECTS OCCUR  
  
 omeprazole 20 mg capsule Commonly known as:  PRILOSEC Take 20 mg by mouth daily. senna 8.6 mg tablet Commonly known as:  Peter Kiewit Stephanie Take 1 Tab by mouth daily. We Performed the Following BETAMETHASONE ACETATE & SODIUM PHOSPHATE INJECTION 3 MG EA. [ [facility]] DRAIN/INJECT LARGE JOINT/BURSA [id number] CPT(R)] Follow-up Instructions Return if symptoms worsen or fail to improve. Patient Instructions Shoulder Arthritis: Exercises Your Care Instructions Here are some examples of typical rehabilitation exercises for your condition. Start each exercise slowly. Ease off the exercise if you start to have pain. Your doctor or physical therapist will tell you when you can start these exercises and which ones will work best for you. How to do the exercises Shoulder flexion (lying down) To make a wand for this exercise, use a piece of PVC pipe or a broom handle with the broom removed. Make the wand about a foot wider than your shoulders. 1. Lie on your back, holding a wand with both hands. Your palms should face down as you hold the wand. 2. Keeping your elbows straight, slowly raise your arms over your head. Raise them until you feel a stretch in your shoulders, upper back, and chest. 
3. Hold for 15 to 30 seconds. 4. Repeat 2 to 4 times. Shoulder rotation (lying down) To make a wand for this exercise, use a piece of PVC pipe or a broom handle with the broom removed. Make the wand about a foot wider than your shoulders. 1. Lie on your back. Hold a wand with both hands with your elbows bent and palms up. 2. Keep your elbows close to your body, and move the wand across your body toward the sore arm. 3. Hold for 8 to 12 seconds. 4. Repeat 2 to 4 times. Shoulder internal rotation with towel 1. Hold a towel above and behind your head with the arm that is not sore. 2. With your sore arm, reach behind your back and grasp the towel. 3. With the arm above your head, pull the towel upward. Do this until you feel a stretch on the front and outside of your sore shoulder. 4. Hold 15 to 30 seconds. 5. Repeat 2 to 4 times. Shoulder blade squeeze 1. Stand with your arms at your sides, and squeeze your shoulder blades together. Do not raise your shoulders up as you squeeze. 2. Hold 6 seconds. 3. Repeat 8 to 12 times. Resisted rows For this exercise, you will need elastic exercise material, such as surgical tubing or Thera-Band. 1. Put the band around a solid object at about waist level. (A bedpost will work well.) Each hand should hold an end of the band. 2. With your elbows at your sides and bent to 90 degrees, pull the band back. Your shoulder blades should move toward each other. Return to the starting position. 3. Repeat 8 to 12 times. External rotator strengthening exercise 1. Start by tying a piece of elastic exercise material to a doorknob. You can use surgical tubing or Thera-Band. (You may also hold one end of the band in each hand.) 2. Stand or sit with your shoulder relaxed and your elbow bent 90 degrees. Your upper arm should rest comfortably against your side. Squeeze a rolled towel between your elbow and your body for comfort. This will help keep your arm at your side. 3. Hold one end of the elastic band with the hand of the painful arm. 4. Start with your forearm across your belly. Slowly rotate the forearm out away from your body. Keep your elbow and upper arm tucked against the towel roll or the side of your body until you begin to feel tightness in your shoulder. Slowly move your arm back to where you started. 5. Repeat 8 to 12 times. Internal rotator strengthening exercise 1. Start by tying a piece of elastic exercise material to a doorknob. You can use surgical tubing or Thera-Band. 2. Stand or sit with your shoulder relaxed and your elbow bent 90 degrees. Your upper arm should rest comfortably against your side. Squeeze a rolled towel between your elbow and your body for comfort. This will help keep your arm at your side. 3. Hold one end of the elastic band in the hand of the painful arm. 4. Slowly rotate your forearm toward your body until it touches your belly. Slowly move it back to where you started. 5. Keep your elbow and upper arm firmly tucked against the towel roll or at your side. 6. Repeat 8 to 12 times. Pendulum swing If you have pain in your back, do not do this exercise. 1. Hold on to a table or the back of a chair with your good arm. Then bend forward a little and let your sore arm hang straight down. This exercise does not use the arm muscles. Rather, use your legs and your hips to create movement that makes your arm swing freely.  
2. Use the movement from your hips and legs to guide the slightly swinging arm back and forth like a pendulum (or elephant trunk). Then guide it in circles that start small (about the size of a dinner plate). Make the circles a bit larger each day, as your pain allows. 3. Do this exercise for 5 minutes, 5 to 7 times each day. 4. As you have less pain, try bending over a little farther to do this exercise. This will increase the amount of movement at your shoulder. Follow-up care is a key part of your treatment and safety. Be sure to make and go to all appointments, and call your doctor if you are having problems. It's also a good idea to know your test results and keep a list of the medicines you take. Where can you learn more? Go to http://angelica-vishnu.info/. Enter H562 in the search box to learn more about \"Shoulder Arthritis: Exercises. \" Current as of: March 21, 2017 Content Version: 11.4 © 9496-5336 [employer]. Care instructions adapted under license by [employer] (which disclaims liability or warranty for this information). If you have questions about a medical condition or this instruction, always ask your healthcare professional. [place] [address] any warranty or liability for your use of this information. Joint Injections: Care Instructions Your Care Instructions Joint injections are shots into a joint, such as the knee. They may be used to put in medicines, such as pain relievers. Or they can be used to take out fluid. Sometimes the fluid is tested in a lab. This can help find the cause of a joint problem. A corticosteroid, or steroid, shot is used to reduce inflammation in tendons or joints. It is often used to treat problems such as arthritis, tendinitis, and bursitis. Steroids can be injected directly into a painful, inflamed joint. They can also help reduce inflammation of a bursa. A bursa is a sac of fluid.  It cushions and lubricates areas where tendons, ligaments, skin, muscles, or bones rub against each other. A steroid shot can sometimes help with short-term pain relief when other treatments haven't worked. If steroid shots help, pain may improve for weeks or months. Follow-up care is a key part of your treatment and safety. Be sure to make and go to all appointments, and call your doctor if you are having problems. It's also a good idea to know your test results and keep a list of the medicines you take. How can you care for yourself at home? · Put ice or a cold pack on the area for 10 to 20 minutes at a time. Put a thin cloth between the ice and your skin. · Take anti-inflammatory medicines to reduce pain, swelling, or inflammation. These include ibuprofen (Advil, Motrin) and naproxen (Aleve). Read and follow all instructions on the label. · Avoid strenuous activities for several days, especially those that put stress on the area where you got the shot. · If you have dressings over the area, keep them clean and dry. You may remove them when your doctor tells you to. When should you call for help? Call your doctor now or seek immediate medical care if: 
? · You have signs of infection, such as: 
¨ Increased pain, swelling, warmth, or redness. ¨ Red streaks leading from the site. ¨ Pus draining from the site. ¨ A fever. ? Watch closely for changes in your health, and be sure to contact your doctor if you have any problems. Where can you learn more? Go to http://angelica-vishnu.info/. Enter N616 in the search box to learn more about \"Joint Injections: Care Instructions. \" Current as of: March 21, 2017 Content Version: 11.4 © 7907-5450 [employer]. Care instructions adapted under license by [employer] (which disclaims liability or warranty for this information).  If you have questions about a medical condition or this instruction, always ask your healthcare professional. Kenn Gipson[employer] disclaims any warranty or liability for your use of this information. Introducing [facility]! Anisha Azevedo introduces Zoomph patient portal. Now you can access parts of your medical record, email your doctor's office, and request medication refills online. 1. In your internet browser, go to [email or web address]. [email or web address]/[facility] 2. Click on the First Time User? Click Here link in the Sign In box. You will see the New Member Sign Up page. 3. Enter your Zoomph Access Code exactly as it appears below. You will not need to use this code after youve completed the sign-up process. If you do not sign up before the expiration date, you must request a new code. · Zoomph Access Code: [email or web address] Expires: 12/19/2017 10:33 AM 
 
4. Enter the last four digits of your Social Security Number (xxxx) and Date of Birth (mm/dd/yyyy) as indicated and click Submit. You will be taken to the next sign-up page. 5. Create a Zoomph ID. This will be your Zoomph login ID and cannot be changed, so think of one that is secure and easy to remember. 6. Create a Zoomph password. You can change your password at any time. 7. Enter your Password Reset Question and Answer. This can be used at a later time if you forget your password. 8. Enter your e-mail address. You will receive e-mail notification when new information is available in [address] 9. Click Sign Up. You can now view and download portions of your medical record. 10. Click the Download Summary menu link to download a portable copy of your medical information. If you have questions, please visit the Frequently Asked Questions section of the Zoomph website. Remember, Zoomph is NOT to be used for urgent needs. For medical emergencies, dial 911. Now available from your iPhone and Android! Please provide this summary of care documentation to your next provider. Your primary care clinician is listed as Haley Weeks. If you have any questions after today's visit, please call [phone].

## 2017-12-08 NOTE — PROGRESS NOTES
Patient: Yakov Frias MRN: [id number]       SSN: [id number]  YOB: 1930        AGE: 80 y.o. SEX: male    PCP: Gricelda Castro MD  12/08/17    Chief Complaint   Patient presents with    Shoulder Pain     Right     HISTORY:  Yakov Frias is a 80 y.o. male who is seen for increased right shoulder pain. He reports increased shoulder pain since 12/25/16 related to rotator cuff arthropathy. He was previously seen on 1/4/13 for shoulder pain. He has a h/o right shoulder arthritis and rotator cuff arthropathy in the past.  He has received injections from Dr. Corrie Coyle years ago. He notes pain with overhead activities and wood-working. He notes extreme pain at times when he raises his arm overhead. He reports increased shoulder pain while doing vigorous exercises. He notes some relief from shoulder strengthening exercises. He denies any previous shoulder injury or trauma. He is using a single-point cane to ambulate. He is s/p left TKR by Dr. Brian Cordoba in 2008. He was seen by Dr. Quezada for his right knee pain and received a cortisone injection with relief. He notes he received bilateral visco supplementation with relief over 10 years ago. Pain Assessment  12/8/2017   Location of Pain Shoulder   Location Modifiers Right   Severity of Pain 7   Quality of Pain Sharp; Aching   Quality of Pain Comment -   Duration of Pain Persistent   Frequency of Pain Intermittent   Aggravating Factors Bending;Stretching;Straightening   Aggravating Factors Comment -   Limiting Behavior Yes   Relieving Factors Other (Comment)   Relieving Factors Comment pain meds   Result of Injury No   Work-Related Injury -     Occupation, etc:  Mr. 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 He retired in [facility] as a . 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 Current weight is 171 pounds. He has high blood pressure. He is not diabetic.   He reports a h/o gout. He states that he almost cut his entire right hand off when he was 25years old as a result of a bakery accident. He is currently taking Coumadin. He is accompanied to the office by his daughter today who helps to take care of him. He lives alone in [place]- but has people with him most of the day. Weight Metrics 12/8/2017 9/28/2017 9/20/2017 7/5/2017 5/25/2017 5/23/2017 5/18/2017   Weight 171 lb 12.8 oz 170 lb 170 lb 3.2 oz 176 lb 174 lb - 174 lb   BMI 24.65 kg/m2 24.39 kg/m2 24.42 kg/m2 25.25 kg/m2 24.97 kg/m2 24.97 kg/m2 -     Patient Active Problem List   Diagnosis Code    Renal mass N28.89    Back pain M54.9    Prostate cancer ([facility] [address] C61    Gout M10.9    Asthma J45.909    Hypertension I10    Thyroid disease E07.9    Bone metastases (HCC) C79.51    Lumbar stenosis M48.061    Lumbar neuritis M54.16    Chronic bilateral low back pain with sciatica M54.40, G89.29    OAB (overactive bladder) N32.81    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 Cardiovascular: negative for chest pain, claudication, leg swelling, SOB, PRIETO   Gastrointestinal: Negative for pain, N/V/C/D, Blood in stool or urine, dysuria,  hematuria, incontinence, pelvic pain. Musculoskeletal: See HPI   Neurological: Negative for dizziness and weakness. Negative for headaches, Visual changes, confusion, seizures   Phychiatric/Behavioral: Negative for depression, memory loss, substance  abuse. Extremities: Negative for hair changes, rash, or skin lesion changes. Hematologic: Negative for bleeding problems, bruising, pallor or swollen lymph  nodes   Peripheral Vascular: No calf pain, no circulation deficits.     Social History     Social History    Marital status: SINGLE     Spouse name: N/A    Number of children: N/A    Years of education: N/A     Occupational History    Not on file. Social History Main Topics    Smoking status: Former Smoker    Smokeless tobacco: Never Used      Comment: Quit in 1953    Alcohol use No    Drug use: No    Sexual activity: Not on file     Other Topics Concern    Not on file     Social History Narrative      Allergies   Allergen Reactions    Sulfur Rash    Cephalexin Swelling     Pt denies this allergy      Colchicine Swelling    Tramadol Nausea Only      Current Outpatient Prescriptions   Medication Sig    docusate sodium (COLACE) 100 mg capsule TAKE 1 CAPSULE BY MOUTH TWICE DAILY FOR CONSTIPATION, HOLD FOR DIARRHEA.  omeprazole (PRILOSEC) 20 mg capsule Take 20 mg by mouth daily.  aspirin delayed-release 81 mg tablet Take 81 mg by mouth daily.  albuterol (PROVENTIL HFA, VENTOLIN HFA, PROAIR HFA) 90 mcg/actuation inhaler Take 2 Puffs by inhalation every four (4) hours as needed for Wheezing.  COMBIGAN 0.2-0.5 % drop ophthalmic solution Administer 1 Drop to both eyes every twelve (12) hours.  latanoprost (XALATAN) 0.005 % ophthalmic solution Administer 1 Drop to both eyes nightly.  amLODIPine (NORVASC) 10 mg tablet Take 10 mg by mouth daily.  mirabegron ER (MYRBETRIQ) 50 mg ER tablet Take 1 Tab by mouth daily.  HYDROcodone-acetaminophen (NORCO)  mg tablet Take 1 Tab by mouth two (2) times daily as needed for Pain. Max Daily Amount: 2 Tabs. Indications: Pain    fluticasone (FLONASE) 50 mcg/actuation nasal spray SPRAY 1 SPRAY INTO EACH NOSTRIL DAILY    furosemide (LASIX) 20 mg tablet TAKE 1 TABLET BY MOUTH DAILY    mirtazapine (REMERON) 15 mg tablet TAKE 1 TABLET BY MOUTH AT BEDTIME. PLEASE CALL MD IF SIDE EFFECTS OCCUR    senna (SENOKOT) 8.6 mg tablet Take 1 Tab by mouth daily. No current facility-administered medications for this visit.        PHYSICAL EXAMINATION:  Visit Vitals    /63    Pulse 72    Temp 95.1 °F (35.1 °C) (Oral)    Resp 18    Ht 5' 10\" (1.778 m)    Wt 171 lb 12.8 oz (77.9 kg)  SpO2 100%    BMI 24.65 kg/m2     ORTHO EXAMINATION:  Examination Right shoulder Left shoulder   Skin Intact Intact   Effusion - -   Biceps deformity - -   Atrophy +, deltoid -   AC joint tenderness - -   Acromial tenderness + +   Biceps tenderness - -   Forward flexion/Elevation , with crepitaiton 170   Active abduction , with crepitation 160   External rotation ROM 5 30   Internal rotation ROM 35 70   Apprehension - -   Impingement - -   Drop Arm Test + -   Neurovascular Intact Intact   Using a single-point cane to ambulate  Uses left arm to raise right arm    PROCEDURE:  After discussing treatment options, patient's right shoulder was injected with 4 cc Marcaine and 1/2 cc Celestone. Chart reviewed for the following:   Arabella New MD, have reviewed the History, Physical and updated the Allergic reactions for [address] [place] [address] performed immediately prior to start of procedure:  Arabella New MD, have performed the following reviews on [facility]. prior to the start of the procedure:            * Patient was identified by name and date of birth   * Agreement on procedure being performed was verified  * Risks and Benefits explained to the patient  * Procedure site verified and marked as necessary  * Patient was positioned for comfort  * Consent was obtained     Time: 12:40 PM     Date of procedure: 12/8/2017    Procedure performed by:  Jg Sewell MD    Mr. Gwendolyn Flores tolerated the procedure well with no complications. RADIOGRAPHS:  XR RIGHT SHOULDER 1/5/17  IMPRESSION:  No fractures, complete acromioclavicular narrowing, severe glenohumeral and subacromial narrowing, no calcific densities. XR RIGHT SHOULDER 1/4/13  IMPRESSION:  No fractures, severe acromioclavicular narrowing, severe glenohumeral narrowing, no calcific densities, severe subacromial narrowing, small inferior acromioclavicular spur formation, osteopenia by x ray.     IMPRESSION: ICD-10-CM ICD-9-CM    1. Primary osteoarthritis of right shoulder M19.011 715.11 betamethasone (CELESTONE SOLUSPAN) 6 mg/mL injection      BETAMETHASONE ACETATE & SODIUM PHOSPHATE INJECTION 3 MG EA.      DRAIN/INJECT LARGE JOINT/BURSA      bupivacaine, PF, (MARCAINE, PF,) 0.25 % (2.5 mg/mL) injection   2. Chronic right shoulder pain M25.511 719.41 betamethasone (CELESTONE SOLUSPAN) 6 mg/mL injection    G89.29 338.29 BETAMETHASONE ACETATE & SODIUM PHOSPHATE INJECTION 3 MG EA.      DRAIN/INJECT LARGE JOINT/BURSA      bupivacaine, PF, (MARCAINE, PF,) 0.25 % (2.5 mg/mL) injection   3. Rotator cuff arthropathy, right M12.811 716.81 betamethasone (CELESTONE SOLUSPAN) 6 mg/mL injection      BETAMETHASONE ACETATE & SODIUM PHOSPHATE INJECTION 3 MG EA.      DRAIN/INJECT LARGE JOINT/BURSA      bupivacaine, PF, (MARCAINE, PF,) 0.25 % (2.5 mg/mL) injection   4. 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      DRAIN/INJECT LARGE JOINT/BURSA      bupivacaine, PF, (MARCAINE, PF,) 0.25 % (2.5 mg/mL) injection   5. Osteopenia determined by x-ray M85.80 733.90      PLAN:  After discussing treatment options, patient's right shoulder was injected with 4 cc Marcaine and 1/2 cc Celestone. He will follow up as needed. We discussed possible need for reverse right total shoulder arthroplasty at some time in the future. He wishes to hold off on surgery for now due to his advanced age. Continue right shoulder exercises at home.      Scribed by Amanuel Cherry ([facility]) as dictated by Nan Reyna MD

## 2017-12-14 PROBLEM — M25.551 RIGHT HIP PAIN: Status: ACTIVE | Noted: 2017-01-01

## 2017-12-14 NOTE — PROGRESS NOTES
Terrell Saab [facility].  [address], [address]  [facility], [address]  Phone: [phone]  Fax: [phone]        Tessa Sky  : 1930  PCP: Li Garza MD    PROGRESS NOTE      ASSESSMENT AND PLAN    Diagnoses and all orders for this visit:    1. Right hip pain  -     [92367] Pelvis 1-2 views    2. Spinal stenosis of lumbar region with neurogenic claudication  -     HYDROcodone-acetaminophen (NORCO)  mg tablet; Take 1 Tab by mouth every six (6) hours as needed for Pain (severe pain). Max Daily Amount: 4 Tabs. Indications: Pain    1. Advised to continue HEP. 2. Safe use of opioids discussed with pt.    3. May use Voltaren gel on his hip. 4. If Pt may call and be scheduled for bursa injection next week, okay to add on. 5. Rx for Norco.     Follow-up Disposition:  Return for pt may call. HISTORY OF PRESENT ILLNESS  Leah Knight. is a 80 y.o. male. Pt presents to the office for a f/u visit for back pain. He feels a new pain in his R hip. He has started to have an ache starting in his R hip into his RLE. He had an injury many years ago to his hip. His pain started after his visit with Dr. Selena Maynard 6 days ago. He saw Dr. Selena Maynard for his shoulder pain, had injection w/benefit. He has a tightness in his RLE, muscle spasms at night. Pt denies any specific incident or injury that caused their pain. His last R hip XR was in . Pt feels weakness in RLE recently. He states that he will feel his leg go out on him. Pt denies saddle paresthesias. Pt states he has been using PRN Norco  mg with relief, has been using more often due to R hip pain. Pt denies any dizziness, confusion, uncontrolled constipation, and cravings due to controlled substances. He has Voltaren gel, has not tried on his hip. He takes Tylenol before he takes Norco. Denies persistent fevers, chills, weight changes, neurogenic bowel or bladder symptoms.  Pt denies recent ED visits or hospitalizations. Has had shoulder bursa injection 6 days ago with relief. Back feels a little sore, denies pain.  reviewed. Last Rx of Norco filled 9/17/17 #30. Has been 6 months since his last ADAMARIS. Pain Assessment  12/14/2017   Location of Pain Back   Location Modifiers -   Severity of Pain 8   Quality of Pain Aching   Quality of Pain Comment -   Duration of Pain -   Frequency of Pain Constant   Aggravating Factors (No Data)   Aggravating Factors Comment ongoing pain   Limiting Behavior -   Relieving Factors (No Data)   Relieving Factors Comment Pain Medication   Result of Injury -   Work-Related Injury -           R Hip XR from 2/2010 reviewed:  Result Narrative   Ordering MD: Soraya Ramirez MD  Interpreted By: Mary Coombs MD  ** FINAL **  ---------------------------------------------------------------------  INTERPRETATION:  Right Hip AP and Lateral Views  CPT code: 68694  History: Posterior right thigh pain  Comparison: None  Findings: The right hip is well seated.  No evidence of acute   fracture or dislocation identified.  No radiographic evidence of   avascular necrosis or arthritic manifestation identified.    Visualized portion of the pelvic bones and the contralateral hip   appear unremarkable. Multiple surgical clips are present in the   bilateral nor pelvis. IMPRESSION[de-identified]  No acute finding.   Thank you for your referral.            PAST MEDICAL HISTORY   Past Medical History:   Diagnosis Date    Arthritis     Asthma     Back pain     Edema     Glaucoma     Gout     History of phimosis 06/20/2008    S/p circumcision     Hypertension     Low back pain radiating to both legs     Prostate cancer ([address]     [id number], s/p RP f/b XRT in 1992 due to PSA recurrence >20 years ago    Renal mass     Sciatica     Spinal stenosis     Thromboembolus ([address] 2014    Thyroid disease        Past Surgical History:   Procedure Laterality Date    HX CIRCUMCISION  06/20/2008    HX KNEE REPLACEMENT      HX OTHER SURGICAL  10/92    [facility]-Laser surgery    HX OTHER SURGICAL  8/92    Z_PR REMV prostate,retropub, radical   .      MEDICATIONS      Current Outpatient Prescriptions   Medication Sig Dispense Refill    azithromycin (ZITHROMAX) 250 mg tablet TAKE 2 TABLETS BY MOUTH TODAY, THEN TAKE 1 TABLET DAILY FOR 4 DAYS  0    diclofenac (VOLTAREN) 1 % gel APPLY SPARINGLY TO AFFECTED AREA(S) ONCE DAILY  0    ferrous sulfate 325 mg (65 mg iron) tablet TAKE 1 TABLET BY MOUTH ONCE A DAY  0    HYDROcodone-acetaminophen (NORCO)  mg tablet Take 1 Tab by mouth every six (6) hours as needed for Pain (severe pain). Max Daily Amount: 4 Tabs. Indications: Pain 28 Tab 0    docusate sodium (COLACE) 100 mg capsule TAKE 1 CAPSULE BY MOUTH TWICE DAILY FOR CONSTIPATION, HOLD FOR DIARRHEA.  0    mirabegron ER (MYRBETRIQ) 50 mg ER tablet Take 1 Tab by mouth daily. 90 Tab 3    omeprazole (PRILOSEC) 20 mg capsule Take 20 mg by mouth daily.  aspirin delayed-release 81 mg tablet Take 81 mg by mouth daily.  fluticasone (FLONASE) 50 mcg/actuation nasal spray SPRAY 1 SPRAY INTO EACH NOSTRIL DAILY  1    furosemide (LASIX) 20 mg tablet TAKE 1 TABLET BY MOUTH DAILY  1    mirtazapine (REMERON) 15 mg tablet TAKE 1 TABLET BY MOUTH AT BEDTIME. PLEASE CALL MD IF SIDE EFFECTS OCCUR  1    senna (SENOKOT) 8.6 mg tablet Take 1 Tab by mouth daily. 14 Tab 0    albuterol (PROVENTIL HFA, VENTOLIN HFA, PROAIR HFA) 90 mcg/actuation inhaler Take 2 Puffs by inhalation every four (4) hours as needed for Wheezing. 1 Inhaler 0    COMBIGAN 0.2-0.5 % drop ophthalmic solution Administer 1 Drop to both eyes every twelve (12) hours.  latanoprost (XALATAN) 0.005 % ophthalmic solution Administer 1 Drop to both eyes nightly.  amLODIPine (NORVASC) 10 mg tablet Take 10 mg by mouth daily.           ALLERGIES    Allergies   Allergen Reactions    Sulfur Rash    Cephalexin Swelling     Pt denies this allergy      Colchicine Swelling    Tramadol Nausea Only          SOCIAL HISTORY    Social History     Social History    Marital status: SINGLE     Spouse name: N/A    Number of children: N/A    Years of education: N/A     Occupational History    Not on file. Social History Main Topics    Smoking status: Former Smoker    Smokeless tobacco: Never Used      Comment: Quit in 1953    Alcohol use No    Drug use: No    Sexual activity: Not on file     Other Topics Concern    Not on file     Social History Narrative       FAMILY HISTORY    Family History   Problem Relation Age of Onset    Arthritis-osteo Father     Asthma Other        REVIEW OF SYSTEMS  Review of Systems   Constitutional: Negative for chills, fever and weight loss. Respiratory: Negative for shortness of breath. Cardiovascular: Negative for chest pain. Gastrointestinal: Negative for constipation. Negative for fecal incontinence   Genitourinary: Negative for dysuria. Negative for urinary incontinence   Musculoskeletal:        Per HPI   Skin: Negative for rash. Neurological: Positive for focal weakness. Negative for dizziness, tingling, tremors and headaches. Endo/Heme/Allergies: Does not bruise/bleed easily. Psychiatric/Behavioral: The patient does not have insomnia. PHYSICAL EXAMINATION  Visit Vitals    /49    Pulse 80    Temp 98.3 °F (36.8 °C) (Oral)    Resp 18    Ht 5' 10\" (1.778 m)    Wt 170 lb 6.4 oz (77.3 kg)    SpO2 97%    BMI 24.45 kg/m2         Accompanied by family member. Constitutional:  Well developed, well nourished, in no acute distress. Psychiatric: Affect and mood are appropriate. Integumentary: No rashes or abrasions noted on exposed areas. Cardiovascular/Peripheral Vascular: Intact l pulses. No peripheral edema is noted. Lymphatic:  No evidence of lymphedema. No cervical lymphadenopathy. SPINE/MUSCULOSKELETAL EXAM    Lumbar spine:  No rash, ecchymosis, or gross obliquity.  No fasciculations. No focal atrophy is noted. No tenderness to palpation at the sciatic notch. SI joints non-tender. Trochanters non tender. Sensation grossly intact to light touch. MOTOR:       Hip Flex  Quads Hamstrings Ankle DF EHL Ankle PF   Right +4/5 +4/5 +4/5 +4/5 +4/5 +4/5   Left +4/5 +4/5 +4/5 +4/5 +4/5 +4/5       Ambulation with single point cane. FWB. RADIOGRAPHS  Pelvis xray films reviewed:  1) No Acute osseous findings. Written by Shi Churchill, as dictated by Miranda Toledo MD.    I, Dr. Miranda Toledo MD, confirm that all documentation is accurate. Mr. Brian Hinton may have a reminder for a \"due or due soon\" health maintenance. I have asked that he contact his primary care provider for follow-up on this health maintenance.

## 2017-12-14 NOTE — PROGRESS NOTES
Verbal order entered per Dr. Jenny Blank as documented on blue sheet:Norco 10/325mg take 1 tab po q6 hours prn severe pain. Disp 28 no refill.

## 2018-01-01 ENCOUNTER — OFFICE VISIT (OUTPATIENT)
Dept: ORTHOPEDIC SURGERY | Age: 83
End: 2018-01-01

## 2018-01-01 ENCOUNTER — HOSPITAL ENCOUNTER (EMERGENCY)
Age: 83
Discharge: HOME | DRG: 699 | End: 2018-08-01
Attending: EMERGENCY MEDICINE | Admitting: EMERGENCY MEDICINE
Payer: MEDICARE

## 2018-01-01 ENCOUNTER — APPOINTMENT (OUTPATIENT)
Dept: CT IMAGING | Age: 83
DRG: 699 | End: 2018-01-01
Attending: UROLOGY
Payer: MEDICARE

## 2018-01-01 ENCOUNTER — APPOINTMENT (OUTPATIENT)
Dept: GENERAL RADIOLOGY | Age: 83
End: 2018-01-01
Attending: PHYSICAL MEDICINE & REHABILITATION
Payer: MEDICARE

## 2018-01-01 ENCOUNTER — HOSPITAL ENCOUNTER (INPATIENT)
Age: 83
LOS: 3 days | DRG: 699 | End: 2018-08-05
Attending: EMERGENCY MEDICINE | Admitting: FAMILY MEDICINE
Payer: MEDICARE

## 2018-01-01 ENCOUNTER — HOSPITAL ENCOUNTER (OUTPATIENT)
Age: 83
Setting detail: OUTPATIENT SURGERY
Discharge: HOME | End: 2018-04-24
Attending: PHYSICAL MEDICINE & REHABILITATION | Admitting: PHYSICAL MEDICINE & REHABILITATION
Payer: MEDICARE

## 2018-01-01 ENCOUNTER — OFFICE VISIT (OUTPATIENT)
Dept: ORTHOPEDIC SURGERY | Facility: CLINIC | Age: 83
End: 2018-01-01

## 2018-01-01 VITALS
SYSTOLIC BLOOD PRESSURE: 140 MMHG | WEIGHT: 176 LBS | TEMPERATURE: 98.2 F | HEIGHT: 70 IN | HEART RATE: 90 BPM | OXYGEN SATURATION: 97 % | BODY MASS INDEX: 25.2 KG/M2 | DIASTOLIC BLOOD PRESSURE: 56 MMHG | RESPIRATION RATE: 18 BRPM

## 2018-01-01 VITALS
RESPIRATION RATE: 16 BRPM | WEIGHT: 176 LBS | TEMPERATURE: 98 F | DIASTOLIC BLOOD PRESSURE: 63 MMHG | SYSTOLIC BLOOD PRESSURE: 127 MMHG | HEART RATE: 95 BPM | HEIGHT: 70 IN | BODY MASS INDEX: 25.2 KG/M2 | OXYGEN SATURATION: 98 %

## 2018-01-01 VITALS
WEIGHT: 170.8 LBS | SYSTOLIC BLOOD PRESSURE: 149 MMHG | DIASTOLIC BLOOD PRESSURE: 62 MMHG | TEMPERATURE: 97.1 F | HEART RATE: 72 BPM | BODY MASS INDEX: 24.45 KG/M2 | HEIGHT: 70 IN

## 2018-01-01 VITALS
SYSTOLIC BLOOD PRESSURE: 216 MMHG | RESPIRATION RATE: 16 BRPM | DIASTOLIC BLOOD PRESSURE: 54 MMHG | BODY MASS INDEX: 25.05 KG/M2 | WEIGHT: 175 LBS | TEMPERATURE: 98 F | HEIGHT: 70 IN | OXYGEN SATURATION: 100 % | HEART RATE: 78 BPM

## 2018-01-01 VITALS
RESPIRATION RATE: 16 BRPM | OXYGEN SATURATION: 97 % | TEMPERATURE: 98.3 F | DIASTOLIC BLOOD PRESSURE: 60 MMHG | SYSTOLIC BLOOD PRESSURE: 163 MMHG | HEART RATE: 83 BPM

## 2018-01-01 VITALS
BODY MASS INDEX: 25.2 KG/M2 | OXYGEN SATURATION: 97 % | WEIGHT: 176 LBS | HEART RATE: 92 BPM | HEIGHT: 70 IN | RESPIRATION RATE: 18 BRPM | SYSTOLIC BLOOD PRESSURE: 195 MMHG | TEMPERATURE: 97.9 F | DIASTOLIC BLOOD PRESSURE: 73 MMHG

## 2018-01-01 DIAGNOSIS — R31.0 GROSS HEMATURIA: Primary | ICD-10-CM

## 2018-01-01 DIAGNOSIS — N13.9 URINARY OBSTRUCTION: ICD-10-CM

## 2018-01-01 DIAGNOSIS — M48.062 SPINAL STENOSIS OF LUMBAR REGION WITH NEUROGENIC CLAUDICATION: Chronic | ICD-10-CM

## 2018-01-01 DIAGNOSIS — M25.551 ARTHRALGIA OF RIGHT HIP: ICD-10-CM

## 2018-01-01 DIAGNOSIS — R31.9 HEMATURIA, UNSPECIFIED TYPE: Primary | ICD-10-CM

## 2018-01-01 DIAGNOSIS — Z79.891 ENCOUNTER FOR LONG-TERM OPIATE ANALGESIC USE: Primary | ICD-10-CM

## 2018-01-01 DIAGNOSIS — M70.61 TROCHANTERIC BURSITIS OF RIGHT HIP: Primary | ICD-10-CM

## 2018-01-01 LAB
ANION GAP SERPL CALC-SCNC: 5 MMOL/L (ref 3–18)
ANION GAP SERPL CALC-SCNC: 7 MMOL/L (ref 3–18)
ANION GAP SERPL CALC-SCNC: 8 MMOL/L (ref 3–18)
APPEARANCE UR: ABNORMAL
APPEARANCE UR: ABNORMAL
APTT PPP: 32.6 SEC (ref 23–36.4)
BACTERIA SPEC CULT: NORMAL
BACTERIA URNS QL MICRO: ABNORMAL /HPF
BACTERIA URNS QL MICRO: ABNORMAL /HPF
BASOPHILS # BLD: 0 K/UL (ref 0–0.1)
BASOPHILS NFR BLD: 1 % (ref 0–2)
BILIRUB UR QL: ABNORMAL
BILIRUB UR QL: NEGATIVE
BUN SERPL-MCNC: 37 MG/DL (ref 7–18)
BUN SERPL-MCNC: 39 MG/DL (ref 7–18)
BUN SERPL-MCNC: 39 MG/DL (ref 7–18)
BUN SERPL-MCNC: 40 MG/DL (ref 7–18)
BUN SERPL-MCNC: 45 MG/DL (ref 7–18)
BUN/CREAT SERPL: 16 (ref 12–20)
BUN/CREAT SERPL: 17 (ref 12–20)
BUN/CREAT SERPL: 18 (ref 12–20)
CALCIUM SERPL-MCNC: 7.3 MG/DL (ref 8.5–10.1)
CALCIUM SERPL-MCNC: 8 MG/DL (ref 8.5–10.1)
CALCIUM SERPL-MCNC: 8.4 MG/DL (ref 8.5–10.1)
CALCIUM SERPL-MCNC: 8.9 MG/DL (ref 8.5–10.1)
CALCIUM SERPL-MCNC: 9.7 MG/DL (ref 8.5–10.1)
CHLORIDE SERPL-SCNC: 104 MMOL/L (ref 100–108)
CHLORIDE SERPL-SCNC: 105 MMOL/L (ref 100–108)
CHLORIDE SERPL-SCNC: 105 MMOL/L (ref 100–108)
CHLORIDE SERPL-SCNC: 106 MMOL/L (ref 100–108)
CHLORIDE SERPL-SCNC: 107 MMOL/L (ref 100–108)
CO2 SERPL-SCNC: 21 MMOL/L (ref 21–32)
CO2 SERPL-SCNC: 25 MMOL/L (ref 21–32)
CO2 SERPL-SCNC: 27 MMOL/L (ref 21–32)
CO2 SERPL-SCNC: 29 MMOL/L (ref 21–32)
CO2 SERPL-SCNC: 30 MMOL/L (ref 21–32)
COLOR UR: ABNORMAL
COLOR UR: ABNORMAL
CREAT SERPL-MCNC: 2.11 MG/DL (ref 0.6–1.3)
CREAT SERPL-MCNC: 2.18 MG/DL (ref 0.6–1.3)
CREAT SERPL-MCNC: 2.32 MG/DL (ref 0.6–1.3)
CREAT SERPL-MCNC: 2.49 MG/DL (ref 0.6–1.3)
CREAT SERPL-MCNC: 2.55 MG/DL (ref 0.6–1.3)
DIFFERENTIAL METHOD BLD: ABNORMAL
EOSINOPHIL # BLD: 0.4 K/UL (ref 0–0.4)
EOSINOPHIL NFR BLD: 8 % (ref 0–5)
EPITH CASTS URNS QL MICRO: ABNORMAL /LPF (ref 0–5)
EPITH CASTS URNS QL MICRO: ABNORMAL /LPF (ref 0–5)
ERYTHROCYTE [DISTWIDTH] IN BLOOD BY AUTOMATED COUNT: 13 % (ref 11.6–14.5)
ERYTHROCYTE [DISTWIDTH] IN BLOOD BY AUTOMATED COUNT: 13.2 % (ref 11.6–14.5)
ERYTHROCYTE [DISTWIDTH] IN BLOOD BY AUTOMATED COUNT: 13.3 % (ref 11.6–14.5)
ERYTHROCYTE [DISTWIDTH] IN BLOOD BY AUTOMATED COUNT: 13.3 % (ref 11.6–14.5)
ERYTHROCYTE [DISTWIDTH] IN BLOOD BY AUTOMATED COUNT: 13.4 % (ref 11.6–14.5)
GLUCOSE BLD STRIP.AUTO-MCNC: 501 MG/DL (ref 70–110)
GLUCOSE SERPL-MCNC: 111 MG/DL (ref 74–99)
GLUCOSE SERPL-MCNC: 128 MG/DL (ref 74–99)
GLUCOSE SERPL-MCNC: 184 MG/DL (ref 74–99)
GLUCOSE SERPL-MCNC: 186 MG/DL (ref 74–99)
GLUCOSE SERPL-MCNC: 294 MG/DL (ref 74–99)
GLUCOSE UR STRIP.AUTO-MCNC: NEGATIVE MG/DL
GLUCOSE UR STRIP.AUTO-MCNC: NEGATIVE MG/DL
HCT VFR BLD AUTO: 21.9 % (ref 36–48)
HCT VFR BLD AUTO: 22 % (ref 36–48)
HCT VFR BLD AUTO: 22.7 % (ref 36–48)
HCT VFR BLD AUTO: 22.8 % (ref 36–48)
HCT VFR BLD AUTO: 26 % (ref 36–48)
HCT VFR BLD AUTO: 26.3 % (ref 36–48)
HCT VFR BLD AUTO: 26.5 % (ref 36–48)
HCT VFR BLD AUTO: 27.3 % (ref 36–48)
HCT VFR BLD AUTO: 27.6 % (ref 36–48)
HCT VFR BLD AUTO: 31.4 % (ref 36–48)
HCT VFR BLD AUTO: 32.5 % (ref 36–48)
HGB BLD-MCNC: 10.5 G/DL (ref 13–16)
HGB BLD-MCNC: 7 G/DL (ref 13–16)
HGB BLD-MCNC: 7.2 G/DL (ref 13–16)
HGB BLD-MCNC: 7.5 G/DL (ref 13–16)
HGB BLD-MCNC: 7.5 G/DL (ref 13–16)
HGB BLD-MCNC: 8.4 G/DL (ref 13–16)
HGB BLD-MCNC: 8.6 G/DL (ref 13–16)
HGB BLD-MCNC: 8.6 G/DL (ref 13–16)
HGB BLD-MCNC: 8.9 G/DL (ref 13–16)
HGB BLD-MCNC: 9.1 G/DL (ref 13–16)
HGB UR QL STRIP: ABNORMAL
HGB UR QL STRIP: ABNORMAL
INR PPP: 1 (ref 0.8–1.2)
KETONES UR QL STRIP.AUTO: NEGATIVE MG/DL
KETONES UR QL STRIP.AUTO: NEGATIVE MG/DL
LEUKOCYTE ESTERASE UR QL STRIP.AUTO: ABNORMAL
LEUKOCYTE ESTERASE UR QL STRIP.AUTO: NEGATIVE
LYMPHOCYTES # BLD: 2.4 K/UL (ref 0.9–3.6)
LYMPHOCYTES NFR BLD: 47 % (ref 21–52)
MAGNESIUM SERPL-MCNC: 2.6 MG/DL (ref 1.6–2.6)
MCH RBC QN AUTO: 29.7 PG (ref 24–34)
MCH RBC QN AUTO: 30 PG (ref 24–34)
MCH RBC QN AUTO: 30.3 PG (ref 24–34)
MCH RBC QN AUTO: 30.4 PG (ref 24–34)
MCH RBC QN AUTO: 30.4 PG (ref 24–34)
MCHC RBC AUTO-ENTMCNC: 32 G/DL (ref 31–37)
MCHC RBC AUTO-ENTMCNC: 32.3 G/DL (ref 31–37)
MCHC RBC AUTO-ENTMCNC: 32.5 G/DL (ref 31–37)
MCHC RBC AUTO-ENTMCNC: 33 G/DL (ref 31–37)
MCHC RBC AUTO-ENTMCNC: 33.4 G/DL (ref 31–37)
MCV RBC AUTO: 91 FL (ref 74–97)
MCV RBC AUTO: 91.8 FL (ref 74–97)
MCV RBC AUTO: 91.9 FL (ref 74–97)
MCV RBC AUTO: 93.3 FL (ref 74–97)
MCV RBC AUTO: 94 FL (ref 74–97)
MONOCYTES # BLD: 0.5 K/UL (ref 0.05–1.2)
MONOCYTES NFR BLD: 9 % (ref 3–10)
NEUTS SEG # BLD: 1.7 K/UL (ref 1.8–8)
NEUTS SEG NFR BLD: 35 % (ref 40–73)
NITRITE UR QL STRIP.AUTO: NEGATIVE
NITRITE UR QL STRIP.AUTO: POSITIVE
PH UR STRIP: 6.5 [PH] (ref 5–8)
PH UR STRIP: 7 [PH] (ref 5–8)
PLATELET # BLD AUTO: 199 K/UL (ref 135–420)
PLATELET # BLD AUTO: 203 K/UL (ref 135–420)
PLATELET # BLD AUTO: 239 K/UL (ref 135–420)
PLATELET # BLD AUTO: 245 K/UL (ref 135–420)
PLATELET # BLD AUTO: 246 K/UL (ref 135–420)
PMV BLD AUTO: 8.7 FL (ref 9.2–11.8)
PMV BLD AUTO: 8.9 FL (ref 9.2–11.8)
PMV BLD AUTO: 9 FL (ref 9.2–11.8)
PMV BLD AUTO: 9.1 FL (ref 9.2–11.8)
PMV BLD AUTO: 9.2 FL (ref 9.2–11.8)
POTASSIUM SERPL-SCNC: 4.1 MMOL/L (ref 3.5–5.5)
POTASSIUM SERPL-SCNC: 4.5 MMOL/L (ref 3.5–5.5)
POTASSIUM SERPL-SCNC: 4.6 MMOL/L (ref 3.5–5.5)
POTASSIUM SERPL-SCNC: 4.7 MMOL/L (ref 3.5–5.5)
POTASSIUM SERPL-SCNC: 5 MMOL/L (ref 3.5–5.5)
PROT UR STRIP-MCNC: 100 MG/DL
PROT UR STRIP-MCNC: 100 MG/DL
PROTHROMBIN TIME: 13.3 SEC (ref 11.5–15.2)
PSA SERPL-MCNC: <0.1 NG/ML (ref 0–4)
RBC # BLD AUTO: 2.33 M/UL (ref 4.7–5.5)
RBC # BLD AUTO: 2.47 M/UL (ref 4.7–5.5)
RBC # BLD AUTO: 2.84 M/UL (ref 4.7–5.5)
RBC # BLD AUTO: 3.45 M/UL (ref 4.7–5.5)
RBC # BLD AUTO: 3.54 M/UL (ref 4.7–5.5)
RBC #/AREA URNS HPF: ABNORMAL /HPF (ref 0–5)
RBC #/AREA URNS HPF: ABNORMAL /HPF (ref 0–5)
SERVICE CMNT-IMP: NORMAL
SODIUM SERPL-SCNC: 135 MMOL/L (ref 136–145)
SODIUM SERPL-SCNC: 137 MMOL/L (ref 136–145)
SODIUM SERPL-SCNC: 139 MMOL/L (ref 136–145)
SODIUM SERPL-SCNC: 142 MMOL/L (ref 136–145)
SODIUM SERPL-SCNC: 142 MMOL/L (ref 136–145)
SP GR UR REFRACTOMETRY: 1 (ref 1–1.03)
SP GR UR REFRACTOMETRY: 1.01 (ref 1–1.03)
UROBILINOGEN UR QL STRIP.AUTO: 0.2 EU/DL (ref 0.2–1)
UROBILINOGEN UR QL STRIP.AUTO: 0.2 EU/DL (ref 0.2–1)
WBC # BLD AUTO: 10.9 K/UL (ref 4.6–13.2)
WBC # BLD AUTO: 5 K/UL (ref 4.6–13.2)
WBC # BLD AUTO: 7 K/UL (ref 4.6–13.2)
WBC # BLD AUTO: 7.4 K/UL (ref 4.6–13.2)
WBC # BLD AUTO: 7.7 K/UL (ref 4.6–13.2)
WBC URNS QL MICRO: ABNORMAL /HPF (ref 0–4)
WBC URNS QL MICRO: ABNORMAL /HPF (ref 0–4)

## 2018-01-01 PROCEDURE — 74011000258 HC RX REV CODE- 258: Performed by: UROLOGY

## 2018-01-01 PROCEDURE — 74011250637 HC RX REV CODE- 250/637: Performed by: STUDENT IN AN ORGANIZED HEALTH CARE EDUCATION/TRAINING PROGRAM

## 2018-01-01 PROCEDURE — 77030014124 HC TY EPDRL BBMI -A: Performed by: PHYSICAL MEDICINE & REHABILITATION

## 2018-01-01 PROCEDURE — 80048 BASIC METABOLIC PNL TOTAL CA: CPT

## 2018-01-01 PROCEDURE — 36415 COLL VENOUS BLD VENIPUNCTURE: CPT

## 2018-01-01 PROCEDURE — 65270000029 HC RM PRIVATE

## 2018-01-01 PROCEDURE — 74176 CT ABD & PELVIS W/O CONTRAST: CPT

## 2018-01-01 PROCEDURE — 81001 URINALYSIS AUTO W/SCOPE: CPT | Performed by: STUDENT IN AN ORGANIZED HEALTH CARE EDUCATION/TRAINING PROGRAM

## 2018-01-01 PROCEDURE — 85014 HEMATOCRIT: CPT | Performed by: STUDENT IN AN ORGANIZED HEALTH CARE EDUCATION/TRAINING PROGRAM

## 2018-01-01 PROCEDURE — 99283 EMERGENCY DEPT VISIT LOW MDM: CPT

## 2018-01-01 PROCEDURE — 85018 HEMOGLOBIN: CPT

## 2018-01-01 PROCEDURE — 74011250636 HC RX REV CODE- 250/636: Performed by: PHYSICAL MEDICINE & REHABILITATION

## 2018-01-01 PROCEDURE — 74011250636 HC RX REV CODE- 250/636: Performed by: STUDENT IN AN ORGANIZED HEALTH CARE EDUCATION/TRAINING PROGRAM

## 2018-01-01 PROCEDURE — 96374 THER/PROPH/DIAG INJ IV PUSH: CPT

## 2018-01-01 PROCEDURE — 36415 COLL VENOUS BLD VENIPUNCTURE: CPT | Performed by: STUDENT IN AN ORGANIZED HEALTH CARE EDUCATION/TRAINING PROGRAM

## 2018-01-01 PROCEDURE — 85027 COMPLETE CBC AUTOMATED: CPT

## 2018-01-01 PROCEDURE — 74011250636 HC RX REV CODE- 250/636: Performed by: FAMILY MEDICINE

## 2018-01-01 PROCEDURE — 77030034849

## 2018-01-01 PROCEDURE — 51798 US URINE CAPACITY MEASURE: CPT

## 2018-01-01 PROCEDURE — 80048 BASIC METABOLIC PNL TOTAL CA: CPT | Performed by: EMERGENCY MEDICINE

## 2018-01-01 PROCEDURE — 74011000250 HC RX REV CODE- 250

## 2018-01-01 PROCEDURE — 85610 PROTHROMBIN TIME: CPT | Performed by: EMERGENCY MEDICINE

## 2018-01-01 PROCEDURE — 51702 INSERT TEMP BLADDER CATH: CPT

## 2018-01-01 PROCEDURE — 84153 ASSAY OF PSA TOTAL: CPT

## 2018-01-01 PROCEDURE — 85027 COMPLETE CBC AUTOMATED: CPT | Performed by: UROLOGY

## 2018-01-01 PROCEDURE — 74011250636 HC RX REV CODE- 250/636: Performed by: EMERGENCY MEDICINE

## 2018-01-01 PROCEDURE — 74011636320 HC RX REV CODE- 636/320

## 2018-01-01 PROCEDURE — 83735 ASSAY OF MAGNESIUM: CPT | Performed by: EMERGENCY MEDICINE

## 2018-01-01 PROCEDURE — 87086 URINE CULTURE/COLONY COUNT: CPT | Performed by: UROLOGY

## 2018-01-01 PROCEDURE — 74011000250 HC RX REV CODE- 250: Performed by: EMERGENCY MEDICINE

## 2018-01-01 PROCEDURE — 96361 HYDRATE IV INFUSION ADD-ON: CPT

## 2018-01-01 PROCEDURE — 87086 URINE CULTURE/COLONY COUNT: CPT

## 2018-01-01 PROCEDURE — 74011250637 HC RX REV CODE- 250/637: Performed by: FAMILY MEDICINE

## 2018-01-01 PROCEDURE — 85025 COMPLETE CBC W/AUTO DIFF WBC: CPT | Performed by: EMERGENCY MEDICINE

## 2018-01-01 PROCEDURE — 96360 HYDRATION IV INFUSION INIT: CPT

## 2018-01-01 PROCEDURE — 92950 HEART/LUNG RESUSCITATION CPR: CPT

## 2018-01-01 PROCEDURE — 82962 GLUCOSE BLOOD TEST: CPT

## 2018-01-01 PROCEDURE — 74011000250 HC RX REV CODE- 250: Performed by: STUDENT IN AN ORGANIZED HEALTH CARE EDUCATION/TRAINING PROGRAM

## 2018-01-01 PROCEDURE — 74011250637 HC RX REV CODE- 250/637: Performed by: PHYSICAL MEDICINE & REHABILITATION

## 2018-01-01 PROCEDURE — 74011000250 HC RX REV CODE- 250: Performed by: FAMILY MEDICINE

## 2018-01-01 PROCEDURE — 77030018846 HC SOL IRR STRL H20 ICUM -A

## 2018-01-01 PROCEDURE — 85730 THROMBOPLASTIN TIME PARTIAL: CPT | Performed by: EMERGENCY MEDICINE

## 2018-01-01 PROCEDURE — 0T9B70Z DRAINAGE OF BLADDER WITH DRAINAGE DEVICE, VIA NATURAL OR ARTIFICIAL OPENING: ICD-10-PCS | Performed by: FAMILY MEDICINE

## 2018-01-01 PROCEDURE — 76010000009 HC PAIN MGT 0 TO 30 MIN PROC: Performed by: PHYSICAL MEDICINE & REHABILITATION

## 2018-01-01 PROCEDURE — 74011636320 HC RX REV CODE- 636/320: Performed by: PHYSICAL MEDICINE & REHABILITATION

## 2018-01-01 PROCEDURE — 77030005538 HC CATH URETH FOL44 BARD -B

## 2018-01-01 PROCEDURE — 85018 HEMOGLOBIN: CPT | Performed by: EMERGENCY MEDICINE

## 2018-01-01 PROCEDURE — 77030010522

## 2018-01-01 PROCEDURE — 99282 EMERGENCY DEPT VISIT SF MDM: CPT

## 2018-01-01 PROCEDURE — 74011250636 HC RX REV CODE- 250/636

## 2018-01-01 PROCEDURE — 85014 HEMATOCRIT: CPT

## 2018-01-01 PROCEDURE — 81001 URINALYSIS AUTO W/SCOPE: CPT | Performed by: EMERGENCY MEDICINE

## 2018-01-01 RX ORDER — ATROPA BELLADONNA AND OPIUM 16.2; 6 MG/1; MG/1
1 SUPPOSITORY RECTAL
Status: DISCONTINUED | OUTPATIENT
Start: 2018-01-01 | End: 2018-01-01 | Stop reason: HOSPADM

## 2018-01-01 RX ORDER — NABUMETONE 500 MG/1
750 TABLET, FILM COATED ORAL DAILY
Status: DISCONTINUED | OUTPATIENT
Start: 2018-01-01 | End: 2018-01-01

## 2018-01-01 RX ORDER — MORPHINE SULFATE 2 MG/ML
1 INJECTION, SOLUTION INTRAMUSCULAR; INTRAVENOUS
Status: DISCONTINUED | OUTPATIENT
Start: 2018-01-01 | End: 2018-01-01

## 2018-01-01 RX ORDER — SODIUM BICARBONATE 1 MEQ/ML
SYRINGE (ML) INTRAVENOUS
Status: COMPLETED | OUTPATIENT
Start: 2018-01-01 | End: 2018-01-01

## 2018-01-01 RX ORDER — POLYETHYLENE GLYCOL 3350 17 G/17G
17 POWDER, FOR SOLUTION ORAL DAILY
Status: DISCONTINUED | OUTPATIENT
Start: 2018-01-01 | End: 2018-01-01 | Stop reason: HOSPADM

## 2018-01-01 RX ORDER — DEXTROSE MONOHYDRATE AND SODIUM CHLORIDE 5; .45 G/100ML; G/100ML
125 INJECTION, SOLUTION INTRAVENOUS CONTINUOUS
Status: DISCONTINUED | OUTPATIENT
Start: 2018-01-01 | End: 2018-01-01 | Stop reason: HOSPADM

## 2018-01-01 RX ORDER — DIAZEPAM 5 MG/1
5-20 TABLET ORAL ONCE
Status: COMPLETED | OUTPATIENT
Start: 2018-01-01 | End: 2018-01-01

## 2018-01-01 RX ORDER — NALOXONE HYDROCHLORIDE 0.4 MG/ML
INJECTION, SOLUTION INTRAMUSCULAR; INTRAVENOUS; SUBCUTANEOUS
Status: COMPLETED | OUTPATIENT
Start: 2018-01-01 | End: 2018-01-01

## 2018-01-01 RX ORDER — OXYBUTYNIN CHLORIDE 5 MG/1
10 TABLET, EXTENDED RELEASE ORAL DAILY
Status: DISCONTINUED | OUTPATIENT
Start: 2018-01-01 | End: 2018-01-01 | Stop reason: HOSPADM

## 2018-01-01 RX ORDER — THERA TABS 400 MCG
1 TAB ORAL DAILY
Status: DISCONTINUED | OUTPATIENT
Start: 2018-01-01 | End: 2018-01-01 | Stop reason: HOSPADM

## 2018-01-01 RX ORDER — ACETAMINOPHEN 325 MG/1
650 TABLET ORAL
Status: DISCONTINUED | OUTPATIENT
Start: 2018-01-01 | End: 2018-01-01 | Stop reason: HOSPADM

## 2018-01-01 RX ORDER — LIDOCAINE HYDROCHLORIDE 20 MG/ML
JELLY TOPICAL ONCE
Status: COMPLETED | OUTPATIENT
Start: 2018-01-01 | End: 2018-01-01

## 2018-01-01 RX ORDER — NALOXONE HYDROCHLORIDE 0.4 MG/ML
0.4 INJECTION, SOLUTION INTRAMUSCULAR; INTRAVENOUS; SUBCUTANEOUS AS NEEDED
Status: DISCONTINUED | OUTPATIENT
Start: 2018-01-01 | End: 2018-01-01 | Stop reason: HOSPADM

## 2018-01-01 RX ORDER — HYDRALAZINE HYDROCHLORIDE 10 MG/1
10 TABLET, FILM COATED ORAL 3 TIMES DAILY
Status: DISCONTINUED | OUTPATIENT
Start: 2018-01-01 | End: 2018-01-01 | Stop reason: HOSPADM

## 2018-01-01 RX ORDER — NABUMETONE 750 MG/1
TABLET, FILM COATED ORAL
Qty: 60 TAB | Refills: 1 | Status: SHIPPED | OUTPATIENT
Start: 2018-01-01

## 2018-01-01 RX ORDER — MORPHINE SULFATE 4 MG/ML
4 INJECTION INTRAVENOUS
Status: COMPLETED | OUTPATIENT
Start: 2018-01-01 | End: 2018-01-01

## 2018-01-01 RX ORDER — DOCUSATE SODIUM 100 MG/1
100 CAPSULE, LIQUID FILLED ORAL 2 TIMES DAILY
Status: DISCONTINUED | OUTPATIENT
Start: 2018-01-01 | End: 2018-01-01 | Stop reason: HOSPADM

## 2018-01-01 RX ORDER — TRIAMCINOLONE ACETONIDE 40 MG/ML
40 INJECTION, SUSPENSION INTRA-ARTICULAR; INTRAMUSCULAR ONCE
Qty: 1 ML | Refills: 0
Start: 2018-01-01 | End: 2018-01-01

## 2018-01-01 RX ORDER — LIDOCAINE HYDROCHLORIDE 10 MG/ML
INJECTION, SOLUTION EPIDURAL; INFILTRATION; INTRACAUDAL; PERINEURAL AS NEEDED
Status: DISCONTINUED | OUTPATIENT
Start: 2018-01-01 | End: 2018-01-01 | Stop reason: HOSPADM

## 2018-01-01 RX ORDER — SENNOSIDES 8.6 MG/1
1 TABLET ORAL DAILY
Status: DISCONTINUED | OUTPATIENT
Start: 2018-01-01 | End: 2018-01-01

## 2018-01-01 RX ORDER — MORPHINE SULFATE 2 MG/ML
1 INJECTION, SOLUTION INTRAMUSCULAR; INTRAVENOUS
Status: COMPLETED | OUTPATIENT
Start: 2018-01-01 | End: 2018-01-01

## 2018-01-01 RX ORDER — NABUMETONE 750 MG/1
750 TABLET, FILM COATED ORAL 2 TIMES DAILY
Qty: 60 TAB | Refills: 1 | Status: SHIPPED | OUTPATIENT
Start: 2018-01-01 | End: 2018-01-01 | Stop reason: SDUPTHER

## 2018-01-01 RX ORDER — PANTOPRAZOLE SODIUM 40 MG/1
40 TABLET, DELAYED RELEASE ORAL
Status: DISCONTINUED | OUTPATIENT
Start: 2018-01-01 | End: 2018-01-01 | Stop reason: HOSPADM

## 2018-01-01 RX ORDER — DEXAMETHASONE SODIUM PHOSPHATE 100 MG/10ML
INJECTION INTRAMUSCULAR; INTRAVENOUS AS NEEDED
Status: DISCONTINUED | OUTPATIENT
Start: 2018-01-01 | End: 2018-01-01 | Stop reason: HOSPADM

## 2018-01-01 RX ORDER — OXYBUTYNIN CHLORIDE 5 MG/1
5 TABLET, EXTENDED RELEASE ORAL DAILY
Status: DISCONTINUED | OUTPATIENT
Start: 2018-01-01 | End: 2018-01-01

## 2018-01-01 RX ORDER — AMLODIPINE BESYLATE 10 MG/1
10 TABLET ORAL DAILY
Status: DISCONTINUED | OUTPATIENT
Start: 2018-01-01 | End: 2018-01-01 | Stop reason: HOSPADM

## 2018-01-01 RX ORDER — HYDROCODONE BITARTRATE AND ACETAMINOPHEN 10; 325 MG/1; MG/1
1 TABLET ORAL
Qty: 30 TAB | Refills: 0 | Status: SHIPPED | OUTPATIENT
Start: 2018-01-01

## 2018-01-01 RX ORDER — CALCIUM CHLORIDE INJECTION 100 MG/ML
INJECTION, SOLUTION INTRAVENOUS
Status: COMPLETED | OUTPATIENT
Start: 2018-01-01 | End: 2018-01-01

## 2018-01-01 RX ORDER — NABUMETONE 750 MG/1
TABLET, FILM COATED ORAL
Qty: 60 TAB | Refills: 1 | Status: SHIPPED | OUTPATIENT
Start: 2018-01-01 | End: 2018-01-01 | Stop reason: SDUPTHER

## 2018-01-01 RX ORDER — ONDANSETRON 2 MG/ML
4 INJECTION INTRAMUSCULAR; INTRAVENOUS
Status: DISCONTINUED | OUTPATIENT
Start: 2018-01-01 | End: 2018-01-01 | Stop reason: HOSPADM

## 2018-01-01 RX ORDER — GLYCERIN ADULT
1 SUPPOSITORY, RECTAL RECTAL
Status: COMPLETED | OUTPATIENT
Start: 2018-01-01 | End: 2018-01-01

## 2018-01-01 RX ORDER — PHENAZOPYRIDINE HYDROCHLORIDE 200 MG/1
200 TABLET, FILM COATED ORAL
Status: DISCONTINUED | OUTPATIENT
Start: 2018-01-01 | End: 2018-01-01

## 2018-01-01 RX ORDER — MORPHINE SULFATE 2 MG/ML
2 INJECTION, SOLUTION INTRAMUSCULAR; INTRAVENOUS
Status: DISCONTINUED | OUTPATIENT
Start: 2018-01-01 | End: 2018-01-01 | Stop reason: HOSPADM

## 2018-01-01 RX ORDER — EPINEPHRINE 0.1 MG/ML
INJECTION INTRACARDIAC; INTRAVENOUS
Status: COMPLETED | OUTPATIENT
Start: 2018-01-01 | End: 2018-01-01

## 2018-01-01 RX ORDER — LATANOPROST 50 UG/ML
1 SOLUTION/ DROPS OPHTHALMIC
Status: DISCONTINUED | OUTPATIENT
Start: 2018-01-01 | End: 2018-01-01 | Stop reason: HOSPADM

## 2018-01-01 RX ORDER — BRIMONIDINE TARTRATE 2 MG/ML
1 SOLUTION/ DROPS OPHTHALMIC EVERY 12 HOURS
Status: DISCONTINUED | OUTPATIENT
Start: 2018-01-01 | End: 2018-01-01 | Stop reason: HOSPADM

## 2018-01-01 RX ORDER — HYDROCODONE BITARTRATE AND ACETAMINOPHEN 10; 325 MG/1; MG/1
1 TABLET ORAL
Qty: 28 TAB | Refills: 0 | Status: SHIPPED | OUTPATIENT
Start: 2018-01-01 | End: 2018-01-01 | Stop reason: SDUPTHER

## 2018-01-01 RX ORDER — ASPIRIN 81 MG/1
81 TABLET ORAL DAILY
Status: DISCONTINUED | OUTPATIENT
Start: 2018-01-01 | End: 2018-01-01 | Stop reason: HOSPADM

## 2018-01-01 RX ORDER — TIMOLOL MALEATE 5 MG/ML
1 SOLUTION/ DROPS OPHTHALMIC EVERY 12 HOURS
Status: DISCONTINUED | OUTPATIENT
Start: 2018-01-01 | End: 2018-01-01 | Stop reason: HOSPADM

## 2018-01-01 RX ADMIN — CALCIUM CHLORIDE 1 G: 100 INJECTION, SOLUTION INTRAVENOUS at 03:11

## 2018-01-01 RX ADMIN — AMLODIPINE BESYLATE 10 MG: 10 TABLET ORAL at 08:41

## 2018-01-01 RX ADMIN — DEXTROSE MONOHYDRATE AND SODIUM CHLORIDE 125 ML/HR: 5; .45 INJECTION, SOLUTION INTRAVENOUS at 01:30

## 2018-01-01 RX ADMIN — TIMOLOL MALEATE 1 DROP: 5 SOLUTION OPHTHALMIC at 22:25

## 2018-01-01 RX ADMIN — BRIMONIDINE TARTRATE 1 DROP: 2 SOLUTION/ DROPS OPHTHALMIC at 21:11

## 2018-01-01 RX ADMIN — EPINEPHRINE 1 MG: 0.1 INJECTION, SOLUTION ENDOTRACHEAL; INTRACARDIAC; INTRAVENOUS at 03:15

## 2018-01-01 RX ADMIN — DOCUSATE SODIUM 100 MG: 100 CAPSULE, LIQUID FILLED ORAL at 18:10

## 2018-01-01 RX ADMIN — ASPIRIN 81 MG: 81 TABLET, COATED ORAL at 08:45

## 2018-01-01 RX ADMIN — EPINEPHRINE 1 MG: 0.1 INJECTION, SOLUTION ENDOTRACHEAL; INTRACARDIAC; INTRAVENOUS at 03:05

## 2018-01-01 RX ADMIN — HYDRALAZINE HYDROCHLORIDE 10 MG: 10 TABLET, FILM COATED ORAL at 21:11

## 2018-01-01 RX ADMIN — DEXTROSE MONOHYDRATE AND SODIUM CHLORIDE 125 ML/HR: 5; .45 INJECTION, SOLUTION INTRAVENOUS at 08:55

## 2018-01-01 RX ADMIN — DOCUSATE SODIUM 100 MG: 100 CAPSULE, LIQUID FILLED ORAL at 08:45

## 2018-01-01 RX ADMIN — TIMOLOL MALEATE 1 DROP: 5 SOLUTION OPHTHALMIC at 15:08

## 2018-01-01 RX ADMIN — EPINEPHRINE 1 MG: 0.1 INJECTION, SOLUTION ENDOTRACHEAL; INTRACARDIAC; INTRAVENOUS at 03:00

## 2018-01-01 RX ADMIN — Medication 1 MG: at 09:40

## 2018-01-01 RX ADMIN — PANTOPRAZOLE SODIUM 40 MG: 40 TABLET, DELAYED RELEASE ORAL at 08:42

## 2018-01-01 RX ADMIN — SODIUM BICARBONATE 50 MEQ: 84 INJECTION, SOLUTION INTRAVENOUS at 03:01

## 2018-01-01 RX ADMIN — Medication 1 MG: at 15:20

## 2018-01-01 RX ADMIN — AMLODIPINE BESYLATE 10 MG: 10 TABLET ORAL at 08:45

## 2018-01-01 RX ADMIN — Medication 1 MG: at 21:11

## 2018-01-01 RX ADMIN — BRIMONIDINE TARTRATE 1 DROP: 2 SOLUTION/ DROPS OPHTHALMIC at 21:00

## 2018-01-01 RX ADMIN — LATANOPROST 1 DROP: 50 SOLUTION/ DROPS OPHTHALMIC at 22:00

## 2018-01-01 RX ADMIN — TIMOLOL MALEATE 1 DROP: 5 SOLUTION OPHTHALMIC at 21:00

## 2018-01-01 RX ADMIN — PANTOPRAZOLE SODIUM 40 MG: 40 TABLET, DELAYED RELEASE ORAL at 08:45

## 2018-01-01 RX ADMIN — GLYCERIN 1 SUPPOSITORY: 2.1 SUPPOSITORY RECTAL at 09:59

## 2018-01-01 RX ADMIN — HYDRALAZINE HYDROCHLORIDE 10 MG: 10 TABLET, FILM COATED ORAL at 16:31

## 2018-01-01 RX ADMIN — HYDRALAZINE HYDROCHLORIDE 10 MG: 10 TABLET, FILM COATED ORAL at 08:45

## 2018-01-01 RX ADMIN — HYDRALAZINE HYDROCHLORIDE 10 MG: 10 TABLET, FILM COATED ORAL at 15:08

## 2018-01-01 RX ADMIN — TIMOLOL MALEATE 1 DROP: 5 SOLUTION OPHTHALMIC at 21:12

## 2018-01-01 RX ADMIN — TIMOLOL MALEATE 1 DROP: 5 SOLUTION OPHTHALMIC at 08:45

## 2018-01-01 RX ADMIN — Medication 2 MG: at 01:08

## 2018-01-01 RX ADMIN — Medication 1 MG: at 02:20

## 2018-01-01 RX ADMIN — OXYBUTYNIN CHLORIDE 5 MG: 5 TABLET, FILM COATED, EXTENDED RELEASE ORAL at 08:45

## 2018-01-01 RX ADMIN — Medication 1 MG: at 08:45

## 2018-01-01 RX ADMIN — Medication 1 MG: at 23:01

## 2018-01-01 RX ADMIN — ONDANSETRON 4 MG: 2 INJECTION INTRAMUSCULAR; INTRAVENOUS at 23:34

## 2018-01-01 RX ADMIN — TIMOLOL MALEATE 1 DROP: 5 SOLUTION OPHTHALMIC at 09:00

## 2018-01-01 RX ADMIN — DOCUSATE SODIUM 100 MG: 100 CAPSULE, LIQUID FILLED ORAL at 18:13

## 2018-01-01 RX ADMIN — LIDOCAINE HYDROCHLORIDE: 20 JELLY TOPICAL at 03:00

## 2018-01-01 RX ADMIN — SODIUM CHLORIDE 1000 ML: 900 INJECTION, SOLUTION INTRAVENOUS at 06:08

## 2018-01-01 RX ADMIN — HYDRALAZINE HYDROCHLORIDE 10 MG: 10 TABLET, FILM COATED ORAL at 18:12

## 2018-01-01 RX ADMIN — HYDRALAZINE HYDROCHLORIDE 10 MG: 10 TABLET, FILM COATED ORAL at 08:41

## 2018-01-01 RX ADMIN — BRIMONIDINE TARTRATE 1 DROP: 2 SOLUTION/ DROPS OPHTHALMIC at 22:24

## 2018-01-01 RX ADMIN — Medication 1 MG: at 21:05

## 2018-01-01 RX ADMIN — BRIMONIDINE TARTRATE 1 DROP: 2 SOLUTION/ DROPS OPHTHALMIC at 15:08

## 2018-01-01 RX ADMIN — EPINEPHRINE 1 MG: 0.1 INJECTION, SOLUTION ENDOTRACHEAL; INTRACARDIAC; INTRAVENOUS at 03:10

## 2018-01-01 RX ADMIN — HYDRALAZINE HYDROCHLORIDE 10 MG: 10 TABLET, FILM COATED ORAL at 22:00

## 2018-01-01 RX ADMIN — DEXTROSE MONOHYDRATE AND SODIUM CHLORIDE 125 ML/HR: 5; .45 INJECTION, SOLUTION INTRAVENOUS at 09:40

## 2018-01-01 RX ADMIN — ACETAMINOPHEN 650 MG: 325 TABLET ORAL at 14:10

## 2018-01-01 RX ADMIN — DIAZEPAM 5 MG: 5 TABLET ORAL at 15:02

## 2018-01-01 RX ADMIN — Medication 1 MG: at 21:45

## 2018-01-01 RX ADMIN — DEXTROSE MONOHYDRATE AND SODIUM CHLORIDE 125 ML/HR: 5; .45 INJECTION, SOLUTION INTRAVENOUS at 18:15

## 2018-01-01 RX ADMIN — DEXTROSE MONOHYDRATE AND SODIUM CHLORIDE 125 ML/HR: 5; .45 INJECTION, SOLUTION INTRAVENOUS at 17:14

## 2018-01-01 RX ADMIN — BRIMONIDINE TARTRATE 1 DROP: 2 SOLUTION/ DROPS OPHTHALMIC at 09:00

## 2018-01-01 RX ADMIN — HYDRALAZINE HYDROCHLORIDE 10 MG: 10 TABLET, FILM COATED ORAL at 22:23

## 2018-01-01 RX ADMIN — DOCUSATE SODIUM 100 MG: 100 CAPSULE, LIQUID FILLED ORAL at 17:14

## 2018-01-01 RX ADMIN — MORPHINE SULFATE 4 MG: 4 INJECTION INTRAVENOUS at 01:10

## 2018-01-01 RX ADMIN — DEXTROSE MONOHYDRATE AND SODIUM CHLORIDE 125 ML/HR: 5; .45 INJECTION, SOLUTION INTRAVENOUS at 00:25

## 2018-01-01 RX ADMIN — BRIMONIDINE TARTRATE 1 DROP: 2 SOLUTION/ DROPS OPHTHALMIC at 08:45

## 2018-01-01 RX ADMIN — THERA TABS 1 TABLET: TAB at 08:45

## 2018-01-01 RX ADMIN — NALOXONE HYDROCHLORIDE 0.4 MG: 0.4 INJECTION, SOLUTION INTRAMUSCULAR; INTRAVENOUS; SUBCUTANEOUS at 03:08

## 2018-01-01 RX ADMIN — EPINEPHRINE 1 MG: 0.1 INJECTION, SOLUTION ENDOTRACHEAL; INTRACARDIAC; INTRAVENOUS at 03:17

## 2018-01-22 NOTE — PROGRESS NOTES
HISTORY OF PRESENT ILLNESS:  Marlen Barnes presents to the office following for right hip and thigh pain. He sees Dr. Janis Anthony for low back pain. He was provided Hydrocodone back her in her office back in December. He has used that sparingly and has had improvement of his symptoms with the medication. He has recently been increasing his activity around the house from an exercise standpoint. He has been exercising twice a day and believes that his increased activities may have contributed to his worsening, right, outer hip pain. He has a history of osteoarthritis of the bilateral hips and generally has been doing well managing his intermittence occurrence of symptoms conservatively. He is status post a left total knee replacement of over 10 years and doing well with the replacement. REVIEW OF SYSTEMS:  No chest pain. Exertional dyspnea. No fever, chills, or night sweats. No rash, no itching. No nausea and no vomiting. He is not a diabetic. He is not allergic to Betadine. PHYSICAL EXAM:  He is a generally fit, 63-year-old, -American male, atraumatic, normocephalic, alert, and oriented times three sitting on the table comfortably. He lies supine with no difficulties. He is in shorts. Examination to the right hip reveals no fracture deformity, lesions, masses, or step-offs. He has pronounced tenderness over the right hip in the area of the greater trochanteric region. With him supine, his passive internal and external rotation of the right hip is noted at 8° and 12° respectively. Internal rotation increases pain to the proximal lateral hip in the area of the trochanteric region. The patient has a negative straight leg raise of the right lower extremity. He has no calf tenderness or evidence of DVT to the right lower extremity. His right knee, with him supine, easily flexes to 95° and extends -5°.      RADIOGRAPHS:  X-rays, AP of the pelvis, which was performed on December 14, 2017, reveals bilateral osteoarthritis of the hips. There are multiple retained clips from his prior pelvic surgeries. There is an intact zipper in the center of the imaging. No fracture deformities or other lesions nor masses identified. IMPRESSION:      1. Right hip pain. 2. Right hip trochanteric bursitis. 3. Bilateral hip osteoarthritis. 4. Status post left total knee replacement. 5. Right knee pain with arthritis. 6. Low back pain, spine following. PROCEDURE:  Using sterile technique, after verbal and written consent were obtained and appropriate time out performed, 2 cc of Kenalog at 40 mg per mL mixed with 4 mL of 0.25% Marcaine, was administered to the right hip in the area of the greater trochanteric bursa. There were no complications. The patient tolerated the procedure well. PLAN:   I am currently recommending a low-dose cortisone injection for his right hip trochanteric bursitic symptoms. We are going to plan on seeing him back on a prn basis. Therapy was recommended. However, he declined. For anti-inflammatory oral systemic activity, I would like to trial him on Relafen 750 mg with food twice daily. He is to avoid taking any Naprosyn, Aleve, or Motrin with the Relafen. Today, his x-rays were reviewed and copies were provided. He will continue his exercise but back off to one period of exercise per day as opposed to two.

## 2018-03-28 NOTE — PROGRESS NOTES
Terrell Saab [facility] 2.  [address], [address]  [facility], [address]  Phone: [phone]  Fax: [phone]        Angelo Ervin  : 1930  PCP: Cassandra Garnica MD    PROGRESS NOTE      ASSESSMENT AND PLAN    Diagnoses and all orders for this visit:    1. Spinal stenosis of lumbar region with neurogenic claudication  -     HYDROcodone-acetaminophen (NORCO)  mg tablet; Take 1 Tab by mouth every six (6) hours as needed for Pain (severe pain). Max Daily Amount: 4 Tabs. Indications: Pain     1. Advised to continue HEP. 2. ADAMARIS scheduled. 3. Given small Norco prescription. 4. Given information on spinal stenosis. Follow-up Disposition:  Return in about 3 months (around 2018). HISTORY OF PRESENT ILLNESS  Leah Farr is a 80 y.o. male. Pt presents to the office for a f/u visit for lumbar stenosis. The patient had an epidural given 10 months ago w/benefit. Pt reports increasing lower back pain. Pt reports pain radiate into his RLE after hitting his right hip against a table (R>L)He describes his right calf and posterior thigh feels tight. Sitting makes his pain worse. Walking helps to relieve his pain. Pt has weakness in RLE. Pt denies saddle paresthesias. Pt states he has been using Vicoden when needed with good relief. He also uses Tylenol. Denies persistent fevers, chills, weight changes, neurogenic bowel or bladder symptoms. Pt denies recent ED visits or hospitalizations. Pt denies any recent fevers, chills, antibiotics, recent cortisone injections, or infections. Requesting ADAMARIS, last injection about 10 months ago, was doing ok until about a few weeks ago.  reviewed. Last fill of [place] was through this office #28.      Pain Assessment  3/28/2018   Location of Pain Back   Location Modifiers -   Severity of Pain 6   Quality of Pain Aching   Quality of Pain Comment -   Duration of Pain -   Frequency of Pain Intermittent   Aggravating Factors -   Aggravating Factors Comment -   Limiting Behavior -   Relieving Factors (No Data)   Relieving Factors Comment vicodin when has it   Result of Injury -   Work-Related Injury -       PAST MEDICAL HISTORY   Past Medical History:   Diagnosis Date    Arthritis     Asthma     Back pain     Edema     Glaucoma     Gout     History of phimosis 06/20/2008    S/p circumcision     Hypertension     Low back pain radiating to both legs     Prostate cancer ([facility] [address]     [id number], s/p RP f/b XRT in 1992 due to PSA recurrence >20 years ago    Renal mass     Sciatica     Spinal stenosis     Thromboembolus ([facility] [address] 2014    Thyroid disease        Past Surgical History:   Procedure Laterality Date    HX CIRCUMCISION  06/20/2008    HX KNEE REPLACEMENT      HX OTHER SURGICAL  10/92    [facility]-Laser surgery    HX OTHER SURGICAL  8/92    Z_PR REMV prostate,retropub, radical   .      MEDICATIONS    Current Outpatient Prescriptions   Medication Sig Dispense Refill    amoxicillin-clavulanate (AUGMENTIN) 875-125 mg per tablet TAKE 1 TABLET EVERY 12 HOURS DAILY. 0    CHEST CONGESTION RELIEF 400 mg tablet TAKE 1/2 TABLET BY MOUTH EVERY 4 HOURS AS NEEDED  0    hydrALAZINE (APRESOLINE) 10 mg tablet TAKE 1 TABLET BY MOUTH BEFORE MEALS  3    multivitamin (ONE-TABLET-DAILY) tablet Take  by mouth.  solifenacin (VESICARE) 5 mg tablet Take 1 Tab by mouth daily. 90 Tab 3    nabumetone (RELAFEN) 750 mg tablet Take 1 Tab by mouth two (2) times a day. Indications: OSTEOARTHRITIS 60 Tab 1    diclofenac (VOLTAREN) 1 % gel APPLY SPARINGLY TO AFFECTED AREA(S) ONCE DAILY  0    ferrous sulfate 325 mg (65 mg iron) tablet TAKE 1 TABLET BY MOUTH ONCE A DAY  0    docusate sodium (COLACE) 100 mg capsule TAKE 1 CAPSULE BY MOUTH TWICE DAILY FOR CONSTIPATION, HOLD FOR DIARRHEA.  0    mirabegron ER (MYRBETRIQ) 50 mg ER tablet Take 1 Tab by mouth daily. 90 Tab 3    omeprazole (PRILOSEC) 20 mg capsule Take 20 mg by mouth daily.       aspirin delayed-release 81 mg tablet Take 81 mg by mouth daily.  fluticasone (FLONASE) 50 mcg/actuation nasal spray SPRAY 1 SPRAY INTO EACH NOSTRIL DAILY  1    furosemide (LASIX) 20 mg tablet TAKE 1 TABLET BY MOUTH DAILY  1    mirtazapine (REMERON) 15 mg tablet TAKE 1 TABLET BY MOUTH AT BEDTIME. PLEASE CALL MD IF SIDE EFFECTS OCCUR  1    senna (SENOKOT) 8.6 mg tablet Take 1 Tab by mouth daily. 14 Tab 0    albuterol (PROVENTIL HFA, VENTOLIN HFA, PROAIR HFA) 90 mcg/actuation inhaler Take 2 Puffs by inhalation every four (4) hours as needed for Wheezing. 1 Inhaler 0    COMBIGAN 0.2-0.5 % drop ophthalmic solution Administer 1 Drop to both eyes every twelve (12) hours.  latanoprost (XALATAN) 0.005 % ophthalmic solution Administer 1 Drop to both eyes nightly.  amLODIPine (NORVASC) 10 mg tablet Take 10 mg by mouth daily.  azithromycin (ZITHROMAX) 250 mg tablet TAKE 2 TABLETS BY MOUTH TODAY, THEN TAKE 1 TABLET DAILY FOR 4 DAYS  0    HYDROcodone-acetaminophen (NORCO)  mg tablet Take 1 Tab by mouth every six (6) hours as needed for Pain (severe pain). Max Daily Amount: 4 Tabs. Indications: Pain 28 Tab 0        ALLERGIES  Allergies   Allergen Reactions    Sulfur Rash    Bee Pollen Runny Nose    Cephalexin Swelling     Pt denies this allergy      Colchicine Swelling    Tramadol Nausea Only          SOCIAL HISTORY    Social History     Social History    Marital status: SINGLE     Spouse name: N/A    Number of children: N/A    Years of education: N/A     Occupational History    Not on file.      Social History Main Topics    Smoking status: Former Smoker    Smokeless tobacco: Never Used      Comment: Quit in 1953    Alcohol use No    Drug use: No    Sexual activity: Not on file     Other Topics Concern    Not on file     Social History Narrative       FAMILY HISTORY  Family History   Problem Relation Age of Onset    Arthritis-osteo Father     Asthma Other        REVIEW OF SYSTEMS  Review of Systems   Constitutional: Negative for chills, fever and weight loss. Respiratory: Negative for shortness of breath. Cardiovascular: Negative for chest pain. Gastrointestinal: Negative for constipation. Negative for fecal incontinence   Genitourinary: Negative for dysuria. Negative for urinary incontinence   Musculoskeletal:        Per HPI   Skin: Negative for rash. Neurological: Negative for dizziness, tingling, tremors, focal weakness and headaches. Endo/Heme/Allergies: Does not bruise/bleed easily. Psychiatric/Behavioral: The patient does not have insomnia. PHYSICAL EXAMINATION  Visit Vitals    /63    Pulse 95    Temp 98 °F (36.7 °C) (Oral)    Resp 16    Ht 5' 10\" (1.778 m)    Wt 176 lb (79.8 kg)    SpO2 98%    BMI 25.25 kg/m2         Accompanied by self. Constitutional:  Well developed, well nourished, in no acute distress. Psychiatric: Affect and mood are appropriate. Integumentary: No rashes or abrasions noted on exposed areas. Cardiovascular/Peripheral Vascular: Intact l pulses. +1 nonpitting edema to LLE is noted. Lymphatic:  No evidence of lymphedema. No cervical lymphadenopathy. SPINE/MUSCULOSKELETAL EXAM    Lumbar spine:  No rash, ecchymosis, or gross obliquity. No fasciculations. No focal atrophy is noted. Tenderness to palpation right L4-5, and L5-S1. No tenderness to palpation at the sciatic notch. SI joints non-tender. Trochanters non tender. Right sided dorsiflexion weakness. MOTOR:       Hip Flex  Quads Hamstrings Ankle DF EHL Ankle PF   Right +4/5 +4/5 +4/5 4/5 +4/5 +4/5   Left +4/5 +4/5 +4/5 +4/5 +4/5 +4/5         Straight Leg raise positive on the right at 90 degress. Mild right sided shuffling gait. Ambulation without assistive device. FWB. Written by Mohsen Guerrero, as dictated by Francesca Stearns MD.    I, Dr. Francesca Stearns MD, confirm that all documentation is accurate.        Dre George may have a reminder for a \"due or due soon\" health maintenance. I have asked that he contact his primary care provider for follow-up on this health maintenance.

## 2018-03-28 NOTE — MR AVS SNAPSHOT
[address] [place] [address] [address] [place] [address] 
[phone] Patient: Armin Mcintyre MRN: [id number] DNW:[id number]/[id number] Visit Information Date & Time Provider Department Dept. Phone Encounter #  
 3/28/2018  1:15 PM Norma Henriquez MD 4 [facility], [address] [facility] [phone] [id number] Upcoming Health Maintenance Date Due DTaP/Tdap/Td series (1 - Tdap) 2/28/1951 ZOSTER VACCINE AGE 60> 12/28/1989 GLAUCOMA SCREENING Q2Y 2/28/1995 Pneumococcal 65+ High/Highest Risk (1 of 2 - PCV13) 2/28/1995 Influenza Age 5 to Adult 8/1/2017 MEDICARE YEARLY EXAM 3/14/2018 Allergies as of 3/28/2018  Review Complete On: 3/28/2018 By: Chao Gould Severity Noted Reaction Type Reactions Sulfur Medium 04/10/2013   Side Effect Rash Bee Pollen    Runny Nose Cephalexin  07/07/2008    Swelling Pt denies this allergy Colchicine  08/22/2012    Swelling Tramadol Low 01/21/2016    Nausea Only Current Immunizations  Never Reviewed No immunizations on file. Not reviewed this visit You Were Diagnosed With   
  
 Codes Comments Spinal stenosis of lumbar region with neurogenic claudication     ICD-10-CM: M48.062 
ICD-9-CM: 724.03 Vitals BP Pulse Temp Resp Height(growth percentile) Weight(growth percentile) 127/63 95 98 °F (36.7 °C) (Oral) 16 5' 10\" (1.778 m) 176 lb (79.8 kg) SpO2 BMI Smoking Status 98% 25.25 kg/m2 Former Smoker Vitals History BMI and BSA Data Body Mass Index Body Surface Area  
 25.25 kg/m 2 1.99 m 2 Preferred Pharmacy Pharmacy Name Phone [address] [facility], [address] [facility] [address] Your Updated Medication List  
  
   
This list is accurate as of 3/28/18  1:55 PM.  Always use your most recent med list.  
  
  
  
  
 albuterol 90 mcg/actuation inhaler Commonly known as:  PROVENTIL HFA, VENTOLIN HFA, PROAIR HFA Take 2 Puffs by inhalation every four (4) hours as needed for Wheezing. amLODIPine 10 mg tablet Commonly known as:  Bosie Shield Take 10 mg by mouth daily. amoxicillin-clavulanate 875-125 mg per tablet Commonly known as:  AUGMENTIN  
TAKE 1 TABLET EVERY 12 HOURS DAILY. aspirin delayed-release 81 mg tablet Take 81 mg by mouth daily. azithromycin 250 mg tablet Commonly known as:  Zorita Cull TAKE 2 TABLETS BY MOUTH TODAY, THEN TAKE 1 TABLET DAILY FOR 4 DAYS  
  
 CHEST CONGESTION RELIEF 400 mg tablet Generic drug:  guaiFENesin TAKE 1/2 TABLET BY MOUTH EVERY 4 HOURS AS NEEDED  
  
 COMBIGAN 0.2-0.5 % Drop ophthalmic solution Generic drug:  brimonidine-timolol Administer 1 Drop to both eyes every twelve (12) hours. diclofenac 1 % Gel Commonly known as:  VOLTAREN  
APPLY SPARINGLY TO AFFECTED AREA(S) ONCE DAILY docusate sodium 100 mg capsule Commonly known as:  COLACE  
TAKE 1 CAPSULE BY MOUTH TWICE DAILY FOR CONSTIPATION, HOLD FOR DIARRHEA. ferrous sulfate 325 mg (65 mg iron) tablet TAKE 1 TABLET BY MOUTH ONCE A DAY  
  
 fluticasone 50 mcg/actuation nasal spray Commonly known as:  Severa Lillian SPRAY 1 SPRAY INTO EACH NOSTRIL DAILY  
  
 furosemide 20 mg tablet Commonly known as:  LASIX TAKE 1 TABLET BY MOUTH DAILY  
  
 hydrALAZINE 10 mg tablet Commonly known as:  APRESOLINE  
TAKE 1 TABLET BY MOUTH BEFORE MEALS HYDROcodone-acetaminophen  mg tablet Commonly known as:  Edgardo Silvius Take 1 Tab by mouth every six (6) hours as needed for Pain (severe pain). Max Daily Amount: 4 Tabs. Indications: Pain  
  
 latanoprost 0.005 % ophthalmic solution Commonly known as:  [place] Sky Administer 1 Drop to both eyes nightly. mirabegron ER 50 mg ER tablet Commonly known as:  MYRBETRIQ Take 1 Tab by mouth daily. mirtazapine 15 mg tablet Commonly known as:  Rex Roche  
 TAKE 1 TABLET BY MOUTH AT BEDTIME. PLEASE CALL MD IF SIDE EFFECTS OCCUR  
  
 nabumetone 750 mg tablet Commonly known as:  RELAFEN Take 1 Tab by mouth two (2) times a day. Indications: OSTEOARTHRITIS  
  
 omeprazole 20 mg capsule Commonly known as:  PRILOSEC Take 20 mg by mouth daily. ONE-TABLET-DAILY tablet Generic drug:  multivitamin Take  by mouth. senna 8.6 mg tablet Commonly known as:  [employer] Take 1 Tab by mouth daily. solifenacin 5 mg tablet Commonly known as:  Nicole Borwn Take 1 Tab by mouth daily. Patient Instructions Lumbar Spinal Stenosis: Care Instructions Your Care Instructions Stenosis in the spine is a narrowing of the canal that is around the spinal cord and nerve roots in your back. It can happen as part of aging. Sometimes bone and other tissue grow into this canal and press on the nerves that branch out from the spinal cord. This can cause pain, numbness, and weakness. When it happens in the lower part of your back, it is called lumbar spinal stenosis. It can cause problems in the legs, feet, and rear end (buttocks). You may be able to get relief from the symptoms of spinal stenosis by taking pain medicine. Your doctor may suggest physical therapy and exercises to keep your spine strong and flexible. Some people try steroid shots to reduce swelling. If pain and numbness in your legs are still so bad that you cannot do your normal activities, you may need surgery. Follow-up care is a key part of your treatment and safety. Be sure to make and go to all appointments, and call your doctor if you are having problems. It's also a good idea to know your test results and keep a list of the medicines you take. How can you care for yourself at home? · Take an over-the-counter pain medicine, such as acetaminophen (Tylenol), ibuprofen (Advil, Motrin), or naproxen (Aleve). Be safe with medicines. Read and follow all instructions on the label. · Do not take two or more pain medicines at the same time unless the doctor told you to. Many pain medicines have acetaminophen, which is Tylenol. Too much acetaminophen (Tylenol) can be harmful. · Stay at a healthy weight. Being overweight puts extra strain on your spine. · Change positions often when you sit or stand. This can ease pain. It may also reduce pressure on the spinal cord and its nerves. · Avoid doing things that make your symptoms worse. Walking downhill and standing for a long time may cause pain. · Stretch and strengthen your back muscles as your doctor or physical therapist recommends. If your doctor says it is okay to do them, these exercises may help. ¨ Lie on your back with your knees bent. Gently pull one bent knee to your chest. Put that foot back on the floor, and then pull the other knee to your chest. 
¨ Do pelvic tilts. Lie on your back with your knees bent. Tighten your stomach muscles. Pull your belly button (navel) in and up toward your ribs. You should feel like your back is pressing to the floor and your hips and pelvis are slightly lifting off the floor. Hold for 6 seconds while breathing smoothly. ¨ Stand with your back flat against a wall. Slowly slide down until your knees are slightly bent. Hold for 10 seconds, then slide back up the wall. · Remove or change anything in your house that may cause you to fall. Keep walkways clear of clutter, electrical cords, and throw rugs. When should you call for help? Call 911 anytime you think you may need emergency care. For example, call if: 
? · You are unable to move a leg at all. ?Call your doctor now or seek immediate medical care if: 
? · You have new or worse symptoms in your legs, belly, or buttocks. Symptoms may include: ¨ Numbness or tingling. ¨ Weakness. ¨ Pain. ? · You lose bladder or bowel control. ? Watch closely for changes in your health, and be sure to contact your doctor if you are not getting better as expected. Where can you learn more? Go to http://angelica-vishnu.info/. Brittani Scott in the search box to learn more about \"Lumbar Spinal Stenosis: Care Instructions. \" Current as of: March 21, 2017 Content Version: 11.4 © 3964-7854 [employer]. Care instructions adapted under license by [employer] (which disclaims liability or warranty for this information). If you have questions about a medical condition or this instruction, always ask your healthcare professional. [address] any warranty or liability for your use of this information. Introducing [facility]! [employer] introduces [employer] patient portal. Now you can access parts of your medical record, email your doctor's office, and request medication refills online. 1. In your internet browser, go to [email or web address] 2. Click on the First Time User? Click Here link in the Sign In box. You will see the New Member Sign Up page. 3. Enter your [employer] Access Code exactly as it appears below. You will not need to use this code after youve completed the sign-up process. If you do not sign up before the expiration date, you must request a new code. · [employer] Access Code: [email or web address] Expires: 6/20/2018  4:07 PM 
 
4. Enter the last four digits of your Social Security Number (xxxx) and Date of Birth (mm/dd/yyyy) as indicated and click Submit. You will be taken to the next sign-up page. 5. Create a [employer] ID. This will be your [employer] login ID and cannot be changed, so think of one that is secure and easy to remember. 6. Create a [employer] password. You can change your password at any time. 7. Enter your Password Reset Question and Answer. This can be used at a later time if you forget your password. 8. Enter your e-mail address.  You will receive e-mail notification when new information is available in CURRENT. 9. Click Sign Up. You can now view and download portions of your medical record. 10. Click the Download Summary menu link to download a portable copy of your medical information. If you have questions, please visit the Frequently Asked Questions section of the CURRENT website. Remember, CURRENT is NOT to be used for urgent needs. For medical emergencies, dial 911. Now available from your iPhone and Android! Please provide this summary of care documentation to your next provider. Your primary care clinician is listed as David Hammer. If you have any questions after today's visit, please call [phone].

## 2018-03-28 NOTE — PATIENT INSTRUCTIONS
Lumbar Spinal Stenosis: Care Instructions  Your Care Instructions    Stenosis in the spine is a narrowing of the canal that is around the spinal cord and nerve roots in your back. It can happen as part of aging. Sometimes bone and other tissue grow into this canal and press on the nerves that branch out from the spinal cord. This can cause pain, numbness, and weakness. When it happens in the lower part of your back, it is called lumbar spinal stenosis. It can cause problems in the legs, feet, and rear end (buttocks). You may be able to get relief from the symptoms of spinal stenosis by taking pain medicine. Your doctor may suggest physical therapy and exercises to keep your spine strong and flexible. Some people try steroid shots to reduce swelling. If pain and numbness in your legs are still so bad that you cannot do your normal activities, you may need surgery. Follow-up care is a key part of your treatment and safety. Be sure to make and go to all appointments, and call your doctor if you are having problems. It's also a good idea to know your test results and keep a list of the medicines you take. How can you care for yourself at home? · Take an over-the-counter pain medicine, such as acetaminophen (Tylenol), ibuprofen (Advil, Motrin), or naproxen (Aleve). Be safe with medicines. Read and follow all instructions on the label. · Do not take two or more pain medicines at the same time unless the doctor told you to. Many pain medicines have acetaminophen, which is Tylenol. Too much acetaminophen (Tylenol) can be harmful. · Stay at a healthy weight. Being overweight puts extra strain on your spine. · Change positions often when you sit or stand. This can ease pain. It may also reduce pressure on the spinal cord and its nerves. · Avoid doing things that make your symptoms worse. Walking downhill and standing for a long time may cause pain.   · Stretch and strengthen your back muscles as your doctor or physical therapist recommends. If your doctor says it is okay to do them, these exercises may help. ¨ Lie on your back with your knees bent. Gently pull one bent knee to your chest. Put that foot back on the floor, and then pull the other knee to your chest.  ¨ Do pelvic tilts. Lie on your back with your knees bent. Tighten your stomach muscles. Pull your belly button (navel) in and up toward your ribs. You should feel like your back is pressing to the floor and your hips and pelvis are slightly lifting off the floor. Hold for 6 seconds while breathing smoothly. ¨ Stand with your back flat against a wall. Slowly slide down until your knees are slightly bent. Hold for 10 seconds, then slide back up the wall. · Remove or change anything in your house that may cause you to fall. Keep walkways clear of clutter, electrical cords, and throw rugs. When should you call for help? Call 911 anytime you think you may need emergency care. For example, call if:  ? · You are unable to move a leg at all. ?Call your doctor now or seek immediate medical care if:  ? · You have new or worse symptoms in your legs, belly, or buttocks. Symptoms may include:  ¨ Numbness or tingling. ¨ Weakness. ¨ Pain. ? · You lose bladder or bowel control. ? Watch closely for changes in your health, and be sure to contact your doctor if you are not getting better as expected. Where can you learn more? Go to http://angelica-vishnu.info/. Ankur Liang in the search box to learn more about \"Lumbar Spinal Stenosis: Care Instructions. \"  Current as of: March 21, 2017  Content Version: 11.4  © 9583-8784 [employer]. Care instructions adapted under license by [employer] (which disclaims liability or warranty for this information).  If you have questions about a medical condition or this instruction, always ask your healthcare professional. [address]  any warranty or liability for your use of this information.

## 2018-04-24 NOTE — H&P (VIEW-ONLY)
Terrell Sa[facility] [address].  [address], [address]  [facility], [address]  Phone: [phone]  Fax: [phone]        Matt Serra  : 1930  PCP: Clovis Dunn MD    PROGRESS NOTE      ASSESSMENT AND PLAN    Diagnoses and all orders for this visit:    1. Spinal stenosis of lumbar region with neurogenic claudication  -     HYDROcodone-acetaminophen (NORCO)  mg tablet; Take 1 Tab by mouth every six (6) hours as needed for Pain (severe pain). Max Daily Amount: 4 Tabs. Indications: Pain     1. Advised to continue HEP. 2. ADAMARIS scheduled. 3. Given small Norco prescription. 4. Given information on spinal stenosis. Follow-up Disposition:  Return in about 3 months (around 2018). HISTORY OF PRESENT ILLNESS  Leah Del Cid. is a 80 y.o. male. Pt presents to the office for a f/u visit for lumbar stenosis. The patient had an epidural given 10 months ago w/benefit. Pt reports increasing lower back pain. Pt reports pain radiate into his RLE after hitting his right hip against a table (R>L)He describes his right calf and posterior thigh feels tight. Sitting makes his pain worse. Walking helps to relieve his pain. Pt has weakness in RLE. Pt denies saddle paresthesias. Pt states he has been using [place] when needed with good relief. He also uses Tylenol. Denies persistent fevers, chills, weight changes, neurogenic bowel or bladder symptoms. Pt denies recent ED visits or hospitalizations. Pt denies any recent fevers, chills, antibiotics, recent cortisone injections, or infections. Requesting ADAMARIS, last injection about 10 months ago, was doing ok until about a few weeks ago.  reviewed. Last fill of [place] was through this office #28.      Pain Assessment  3/28/2018   Location of Pain Back   Location Modifiers -   Severity of Pain 6   Quality of Pain Aching   Quality of Pain Comment -   Duration of Pain -   Frequency of Pain Intermittent   Aggravating Factors -   Aggravating Factors Comment -   Limiting Behavior -   Relieving Factors (No Data)   Relieving Factors Comment vicodin when has it   Result of Injury -   Work-Related Injury -       PAST MEDICAL HISTORY   Past Medical History:   Diagnosis Date    Arthritis     Asthma     Back pain     Edema     Glaucoma     Gout     History of phimosis 06/20/2008    S/p circumcision     Hypertension     Low back pain radiating to both legs     Prostate cancer ([facility] [facility]     [id number], s/p RP f/b XRT in 1992 due to PSA recurrence >20 years ago    Renal mass     Sciatica     Spinal stenosis     Thromboembolus ([facility] [facility] 2014    Thyroid disease        Past Surgical History:   Procedure Laterality Date    HX CIRCUMCISION  06/20/2008    HX KNEE REPLACEMENT      HX OTHER SURGICAL  10/92    [facility]-Laser surgery    HX OTHER SURGICAL  8/92    Z_PR REMV prostate,retropub, radical   .      MEDICATIONS    Current Outpatient Prescriptions   Medication Sig Dispense Refill    amoxicillin-clavulanate (AUGMENTIN) 875-125 mg per tablet TAKE 1 TABLET EVERY 12 HOURS DAILY. 0    CHEST CONGESTION RELIEF 400 mg tablet TAKE 1/2 TABLET BY MOUTH EVERY 4 HOURS AS NEEDED  0    hydrALAZINE (APRESOLINE) 10 mg tablet TAKE 1 TABLET BY MOUTH BEFORE MEALS  3    multivitamin (ONE-TABLET-DAILY) tablet Take  by mouth.  solifenacin (VESICARE) 5 mg tablet Take 1 Tab by mouth daily. 90 Tab 3    nabumetone (RELAFEN) 750 mg tablet Take 1 Tab by mouth two (2) times a day. Indications: OSTEOARTHRITIS 60 Tab 1    diclofenac (VOLTAREN) 1 % gel APPLY SPARINGLY TO AFFECTED AREA(S) ONCE DAILY  0    ferrous sulfate 325 mg (65 mg iron) tablet TAKE 1 TABLET BY MOUTH ONCE A DAY  0    docusate sodium (COLACE) 100 mg capsule TAKE 1 CAPSULE BY MOUTH TWICE DAILY FOR CONSTIPATION, HOLD FOR DIARRHEA.  0    mirabegron ER (MYRBETRIQ) 50 mg ER tablet Take 1 Tab by mouth daily. 90 Tab 3    omeprazole (PRILOSEC) 20 mg capsule Take 20 mg by mouth daily.       aspirin delayed-release 81 mg tablet Take 81 mg by mouth daily.  fluticasone (FLONASE) 50 mcg/actuation nasal spray SPRAY 1 SPRAY INTO EACH NOSTRIL DAILY  1    furosemide (LASIX) 20 mg tablet TAKE 1 TABLET BY MOUTH DAILY  1    mirtazapine (REMERON) 15 mg tablet TAKE 1 TABLET BY MOUTH AT BEDTIME. PLEASE CALL MD IF SIDE EFFECTS OCCUR  1    senna (SENOKOT) 8.6 mg tablet Take 1 Tab by mouth daily. 14 Tab 0    albuterol (PROVENTIL HFA, VENTOLIN HFA, PROAIR HFA) 90 mcg/actuation inhaler Take 2 Puffs by inhalation every four (4) hours as needed for Wheezing. 1 Inhaler 0    COMBIGAN 0.2-0.5 % drop ophthalmic solution Administer 1 Drop to both eyes every twelve (12) hours.  latanoprost (XALATAN) 0.005 % ophthalmic solution Administer 1 Drop to both eyes nightly.  amLODIPine (NORVASC) 10 mg tablet Take 10 mg by mouth daily.  azithromycin (ZITHROMAX) 250 mg tablet TAKE 2 TABLETS BY MOUTH TODAY, THEN TAKE 1 TABLET DAILY FOR 4 DAYS  0    HYDROcodone-acetaminophen (NORCO)  mg tablet Take 1 Tab by mouth every six (6) hours as needed for Pain (severe pain). Max Daily Amount: 4 Tabs. Indications: Pain 28 Tab 0        ALLERGIES  Allergies   Allergen Reactions    Sulfur Rash    Bee Pollen Runny Nose    Cephalexin Swelling     Pt denies this allergy      Colchicine Swelling    Tramadol Nausea Only          SOCIAL HISTORY    Social History     Social History    Marital status: SINGLE     Spouse name: N/A    Number of children: N/A    Years of education: N/A     Occupational History    Not on file.      Social History Main Topics    Smoking status: Former Smoker    Smokeless tobacco: Never Used      Comment: Quit in 1953    Alcohol use No    Drug use: No    Sexual activity: Not on file     Other Topics Concern    Not on file     Social History Narrative       FAMILY HISTORY  Family History   Problem Relation Age of Onset    Arthritis-osteo Father     Asthma Other        REVIEW OF SYSTEMS  Review of Systems   Constitutional: Negative for chills, fever and weight loss. Respiratory: Negative for shortness of breath. Cardiovascular: Negative for chest pain. Gastrointestinal: Negative for constipation. Negative for fecal incontinence   Genitourinary: Negative for dysuria. Negative for urinary incontinence   Musculoskeletal:        Per HPI   Skin: Negative for rash. Neurological: Negative for dizziness, tingling, tremors, focal weakness and headaches. Endo/Heme/Allergies: Does not bruise/bleed easily. Psychiatric/Behavioral: The patient does not have insomnia. PHYSICAL EXAMINATION  Visit Vitals    /63    Pulse 95    Temp 98 °F (36.7 °C) (Oral)    Resp 16    Ht 5' 10\" (1.778 m)    Wt 176 lb (79.8 kg)    SpO2 98%    BMI 25.25 kg/m2         Accompanied by self. Constitutional:  Well developed, well nourished, in no acute distress. Psychiatric: Affect and mood are appropriate. Integumentary: No rashes or abrasions noted on exposed areas. Cardiovascular/Peripheral Vascular: Intact l pulses. +1 nonpitting edema to LLE is noted. Lymphatic:  No evidence of lymphedema. No cervical lymphadenopathy. SPINE/MUSCULOSKELETAL EXAM    Lumbar spine:  No rash, ecchymosis, or gross obliquity. No fasciculations. No focal atrophy is noted. Tenderness to palpation right L4-5, and L5-S1. No tenderness to palpation at the sciatic notch. SI joints non-tender. Trochanters non tender. Right sided dorsiflexion weakness. MOTOR:       Hip Flex  Quads Hamstrings Ankle DF EHL Ankle PF   Right +4/5 +4/5 +4/5 4/5 +4/5 +4/5   Left +4/5 +4/5 +4/5 +4/5 +4/5 +4/5         Straight Leg raise positive on the right at 90 degress. Mild right sided shuffling gait. Ambulation without assistive device. FWB. Written by Rukhsana Pacheco, as dictated by Zakia Blakely MD.    I, Dr. Zakia Blakely MD, confirm that all documentation is accurate.       Mr. Tyrone Cooney may have a reminder for a \"due or due soon\" health maintenance. I have asked that he contact his primary care provider for follow-up on this health maintenance.

## 2018-04-24 NOTE — INTERVAL H&P NOTE
H&P Update:  [facility]. was seen and briefly examined. History and physical has been reviewed.  There have been no significant clinical changes since the completion of the originally dated History and Physical.    Signed By: Mathis Phalen, MD     April 24, 2018 3:27 PM

## 2018-04-24 NOTE — DISCHARGE INSTRUCTIONS
[facility]   ([place]O)  Dr. Bautista Morales, Dr. Mary Guillaume, Dr. Noemi Fox not drive a car, operate heavy machinery or dangerous equipment for 24 hours. * Activity as tolerated; rest for the remainder of the day. * Resume pre-block medications including those for your family doctor. * Do not drink alcoholic beverages for 24 hours. Alcohol and the medications you have received may interact and cause an adverse reaction. * You may feel better this evening and worse tomorrow, as the numbing medications wears off and the steroid has yet to begin to work. After 48 hrs the steroid should begin to release bringing you relief. * You may shower this evening and remove any bandages. * Avoid hot tubs and heating pads for 24 hours. You may use cold packs on the procedure site as tolerated for the first 24 hours. * If a headache develops, drink plenty of fluids and rest.  Take over the counter medications for headache if needed. If the headache continues longer than 24 hours, call MD at the [address] [place] [address]. [phone]    * Continue taking pain medications as needed. * You may resume your regular diet if tolerated. Otherwise, start with sips of water and advance slowly. * If Diabetic: check your blood sugar three times a day for the next 3 days. If your sugar is greater than 300 call your family doctor. If your sugar is greater than 400, have someone transport you to the nearest Emergency Room. * If you experience any of the following problems, Please Call the [address] [place] [address] at [phone].         * Shortness of Breath    * Fever of 101 or higher    * Nausea / Vomiting    * Severe Headache    * Weakness or numbness in arms or legs that is not      resolving    * Prolonged increase in pain greater than 4 days      DISCHARGE SUMMARY from Nurse      PATIENT INSTRUCTIONS:    After oral sedation, for 24 hours or while taking prescription Narcotics:  · Limit your activities  · Do not drive and operate hazardous machinery  · Do not make important personal or business decisions  · Do  not drink alcoholic beverages  · If you have not urinated within 8 hours after discharge, please contact your surgeon on call. Report the following to your surgeon:  · Excessive pain, swelling, redness or odor of or around the surgical area  · Temperature over 101  · Nausea and vomiting lasting longer than 4 hours or if unable to take medications  · Any signs of decreased circulation or nerve impairment to extremity: change in color, persistent  numbness, tingling, coldness or increase pain  · Any questions            What to do at Home:  Recommended activity: Activity as tolerated, NO DRIVING FOR 12 Hours post injection          *  Please give a list of your current medications to your Primary Care Provider. *  Please update this list whenever your medications are discontinued, doses are      changed, or new medications (including over-the-counter products) are added. *  Please carry medication information at all times in case of emergency situations. These are general instructions for a healthy lifestyle:    No smoking/ No tobacco products/ Avoid exposure to second hand smoke    Surgeon General's Warning:  Quitting smoking now greatly reduces serious risk to your health. Obesity, smoking, and sedentary lifestyle greatly increases your risk for illness    A healthy diet, regular physical exercise & weight monitoring are important for maintaining a healthy lifestyle    You may be retaining fluid if you have a history of heart failure or if you experience any of the following symptoms:  Weight gain of 3 pounds or more overnight or 5 pounds in a week, increased swelling in our hands or feet or shortness of breath while lying flat in bed.   Please call your doctor as soon as you notice any of these symptoms; do not wait until your next office visit. Recognize signs and symptoms of STROKE:    F-face looks uneven    A-arms unable to move or move unevenly    S-speech slurred or non-existent    T-time-call 911 as soon as signs and symptoms begin-DO NOT go       Back to bed or wait to see if you get better-TIME IS BRAIN.

## 2018-04-24 NOTE — PROCEDURES
Intralaminar Epidural Steroid Procedure Note        Patient Name   [address] [place] [address] Date of Procedure: April 24, 2018  Preoperative Diagnosis: Spinal stenosis, lumbar region, with neurogenic claudication [M48.062]  Postoperative Diagnosis: Same  Location [facility], [address]      Procedure:  Epidural Steroid Injection    Consent:  Informed consent was obtained prior to the procedure. In addition to the potential risks associated with the procedure itself, the patient was informed both verbally and in writing of the potential side effects of the use of glucocorticoid. The patient appeared to comprehend the informed consent and desired to have the procedure performed. Procedure in Detail:  The patient was taken to the procedure suite and placed in the prone position on the operating table on appropriate padding. The posterior lumbar region was prepped and draped in the usual sterile fashion. Intraoperative fluoroscopy was used to localize the L5-S1 interspace. The skin was infiltrated with 1% lidocaine. An 18-gauge Tuohy needle was advanced into the epidural space at L5-S1 under fluoroscopic guidance using the loss of resistance technique. No cerebrospinal fluid was seen throughout the procedure. Yes  A small amount of Isovue was injected into the epidural space, confirming appropriated needle placement on fluoroscopy. No vascular uptake was identified. Next, 2ml of 1% Lidocaine and 30mg of preservative free Dexamethasone were injected via the Tuohy needle. The needle was removed from the patient. The patient tolerated the procedure well and was discharged home with designated  and care instructions.       Signed By: Bushra Estrada MD                        April 24, 2018

## 2018-07-02 NOTE — PROGRESS NOTES
[facility] [address]  [address], [address]  [facility], [address]  Phone: [phone]  Fax: [phone]    Anton Lynn  : 1930  PCP: Dariana Suarez MD    PROGRESS NOTE    HISTORY OF PRESENT ILLNESS:  Chief Complaint   Patient presents with    Back Pain     increased pain, f/u injection     Jessica Pulliam Sr. is a 80 y.o.  male with history of lumbar stenosis and pain. Patient was last seen with Dr. Joy Isaacs. An ES (L5-S1)I was scheduled. A small rx of norco was given for increased pain after hitting a table. Today, he states the [facility] worked a couple of weeks. He will have radiating left leg pains that will radiate down his posterior thigh, L>R. Denies bladder/bowel dysfunction, saddle paresthesia, weakness, gait disturbance, or other neurological deficit. Pt at this time desires to  continue with current care/proceed with medication evaluation. Opioid Assessment    Least pain over the last week has been 8/10. Worst pain over the last week has been 10/10. Aberrant behaviors: None. Urine Drug Screen: due - order placed.  reviewed:yes  Pill count is consistent with his prescription: yes and n/a  Concomitant use of a benzodiazepine: no  MME: 5  Naloxone prescription is warranted. It has been provided or is already on file. Also,  abstinence syndrome was reviewed and discussed with her today N/A    Reports that pain would be a 10/10 w/out medication and that it goes down to a 4/10 after medication. This enables the pt to be functional, achieve ADLs and engage in social activities. Risks and benefits of  opiate therapy have been reviewed with the patient. No pain behaviors. Denies thoughts of harming self or others. Pt has a good risk to benefit ratio which allows the pt to function in a home environment without side effects      This is a chronic problem that is stable.   Per review of available records and patients , there are not sign of overuse, misuse, diversion, or concerning side effects. Today we reviewed: the risk of overdose, addiction, and dependency proper storage and disposal of medications the goals of treatment (improve functionality, quality of life, and pain)  The following changes were made to the patients current treatment plan: nothing, medications refilled. ASSESSMENT  80 y.o. male with lumbar pain. Diagnoses and all orders for this visit:    1. Spinal stenosis of lumbar region with neurogenic claudication  -     SCHEDULE SURGERY  -     HYDROcodone-acetaminophen (NORCO)  mg tablet; Take 1 Tab by mouth daily as needed for Pain (severe pain). Indications: Pain, chronic pain  -     DRUG SCREEN UR - W/ CONFIRM; Future         IMPRESSION/PLAN    1) Pt was given information on back care. 2) UDS today. [place] refill today. 28 pills last him 3 months. 3) Schedule L5/S1 ADAMARIS today. 4) Mr. Thompson Neville has a reminder for a \"due or due soon\" health maintenance. I have asked that he contact his primary care provider, Geo Morales MD, for follow-up on this health maintenance. 5) We have informed patient to notify us for immediate appointment if he has any worsening neurogical symptoms or if an emergency situation presents, then call 911  6) Pt will follow-up in 3 months for FU. Risks and benefits of ongoing opiate therapy have been reviewed with the patient.  is appropriate. No pain behaviors. Denies thoughts of harming self or others. Pt has a good risk to benefit ratio which allows the pt to function in a home environment without side effects.          PAST MEDICAL HISTORY  Past Medical History:   Diagnosis Date    Arthritis     Asthma     Back pain     Edema     Glaucoma     Gout     History of phimosis 06/20/2008    S/p circumcision     Hypertension     Low back pain radiating to both legs     Prostate cancer ([facility]     [id number], s/p RP f/b XRT in 1992 due to PSA recurrence >20 years ago    Renal mass     Sciatica     Spinal stenosis     Thromboembolus ([facility] [facility] 2014    Thyroid disease         MEDICATIONS  Current Outpatient Prescriptions   Medication Sig Dispense Refill    HYDROcodone-acetaminophen (NORCO)  mg tablet Take 1 Tab by mouth daily as needed for Pain (severe pain). Indications: Pain, chronic pain 30 Tab 0    nabumetone (RELAFEN) 750 mg tablet TAKE 1 TAB BY MOUTH 2 TIMES A DAY 60 Tab 1    hydrALAZINE (APRESOLINE) 10 mg tablet TAKE 1 TABLET BY MOUTH BEFORE MEALS  3    multivitamin (ONE-TABLET-DAILY) tablet Take  by mouth.  solifenacin (VESICARE) 5 mg tablet Take 1 Tab by mouth daily. 90 Tab 3    diclofenac (VOLTAREN) 1 % gel APPLY SPARINGLY TO AFFECTED AREA(S) ONCE DAILY  0    ferrous sulfate 325 mg (65 mg iron) tablet TAKE 1 TABLET BY MOUTH ONCE A DAY  0    docusate sodium (COLACE) 100 mg capsule TAKE 1 CAPSULE BY MOUTH TWICE DAILY FOR CONSTIPATION, HOLD FOR DIARRHEA.  0    mirabegron ER (MYRBETRIQ) 50 mg ER tablet Take 1 Tab by mouth daily. 90 Tab 3    omeprazole (PRILOSEC) 20 mg capsule Take 20 mg by mouth daily.  aspirin delayed-release 81 mg tablet Take 81 mg by mouth daily.  mirtazapine (REMERON) 15 mg tablet TAKE 1 TABLET BY MOUTH AT BEDTIME. Nerissa Jordan MD IF SIDE EFFECTS OCCUR  1    albuterol (PROVENTIL HFA, VENTOLIN HFA, PROAIR HFA) 90 mcg/actuation inhaler Take 2 Puffs by inhalation every four (4) hours as needed for Wheezing. 1 Inhaler 0    COMBIGAN 0.2-0.5 % drop ophthalmic solution Administer 1 Drop to both eyes every twelve (12) hours.  latanoprost (XALATAN) 0.005 % ophthalmic solution Administer 1 Drop to both eyes nightly.  amLODIPine (NORVASC) 10 mg tablet Take 10 mg by mouth daily.  furosemide (LASIX) 20 mg tablet TAKE 1 TABLET BY MOUTH DAILY  1    senna (SENOKOT) 8.6 mg tablet Take 1 Tab by mouth daily.  14 Tab 0       ALLERGIES  Allergies   Allergen Reactions    Sulfur Rash    Bee Pollen Runny Nose    Cephalexin Swelling     Pt denies this allergy      Colchicine Swelling    Tramadol Nausea Only       SOCIAL HISTORY    Social History     Social History    Marital status: SINGLE     Spouse name: N/A    Number of children: N/A    Years of education: N/A     Occupational History    Not on file. Social History Main Topics    Smoking status: Former Smoker    Smokeless tobacco: Never Used      Comment: Quit in 1953    Alcohol use No    Drug use: No    Sexual activity: Not on file     Other Topics Concern    Not on file     Social History Narrative       SUBJECTIVE    Pain Scale: 6/10    Pain Assessment  3/28/2018   Location of Pain Back   Location Modifiers -   Severity of Pain 6   Quality of Pain Aching   Quality of Pain Comment -   Duration of Pain -   Frequency of Pain Intermittent   Aggravating Factors -   Aggravating Factors Comment -   Limiting Behavior -   Relieving Factors (No Data)   Relieving Factors Comment vicodin when has it   Result of Injury -   Work-Related Injury -       Accompanied by family member. REVIEW OF SYSTEMS  ROS    Constitutional: Negative for fever, chills, or weight change. Respiratory: Negative for cough or shortness of breath. Cardiovascular: Negative for chest pain or palpitations. Gastrointestinal: Negative for acid reflux, change in bowel habits, or constipation. Genitourinary: Negative for incontinence, dysuria and flank pain. Musculoskeletal: Positive for lumbar pain. Skin: Negative for rash. Neurological: Negative for headaches, dizziness, or numbness. Endo/Heme/Allergies: Negative . Psychiatric/Behavioral: Negative. PHYSICAL EXAMINATION  Visit Vitals    /56    Pulse 90    Temp 98.2 °F (36.8 °C)    Resp 18    Ht 5' 10\" (1.778 m)    Wt 176 lb (79.8 kg)    SpO2 97%    BMI 25.25 kg/m2       Constitutional: Well developed,  well nourished,  awake, alert, and in no acute distress. Neurological:  Sensation to light touch is intact.    Psychiatric: Affect and mood are appropriate. Integumentary: No rashes or abrasions noted on exposed areas,  warm, dry and intact. Cardiovascular/Peripheral Vascular:  No peripheral edema is noted. Lymphatic:  No evidence of lymphedema. No cervical lymphadenopathy. SPINE/MUSCULOSKELETAL EXAM        Lumbar spine:  No rash, ecchymosis, or gross obliquity. No fasciculations. No focal atrophy is noted. Range of motion is intact. Tenderness to palpation to lumbar spine . SI joints non-tender. Trochanters non tender. Musculoskeletal:  No pain with extension, axial loading, or forward flexion. No pain with internal or external rotation of his hips. MOTOR     Hip Flex  Quads Hamstrings Ankle DF EHL Ankle PF   Right +4/5 +4/5 +4/5 +4/5 +4/5 +4/5   Left +4/5 +4/5 +4/5 +4/5 +4/5 +4/5       Straight Leg raise negative bilaterally. normal gait and station    Ambulation without assistive device. full weight bearing, non-antalgic gait.     Kaley Reich NP

## 2018-07-02 NOTE — MR AVS SNAPSHOT
[address] [place] [address] [address] [place] [address] 
[phone] Patient: Nandini Quevedo MRN: [id number] Visit Information Date & Time Provider Department Dept. Phone Encounter #  
 7/2/2018  1:40 PM Satish Gould NP [facility] [phone] [id number] Follow-up Instructions Return in about 3 months (around 10/2/2018) for garay . Follow-up and Disposition History Upcoming Health Maintenance Date Due DTaP/Tdap/Td series (1 - Tdap) 2/28/1951 ZOSTER VACCINE AGE 60> 12/28/1989 GLAUCOMA SCREENING Q2Y 2/28/1995 Pneumococcal 65+ High/Highest Risk (1 of 2 - PCV13) 2/28/1995 MEDICARE YEARLY EXAM 3/14/2018 Influenza Age 5 to Adult 8/1/2018 Allergies as of 7/2/2018  Review Complete On: 4/24/2018 By: Martha Christine MD  
  
 Severity Noted Reaction Type Reactions Sulfur Medium 04/10/2013   Side Effect Rash Bee Pollen    Runny Nose Cephalexin  07/07/2008    Swelling Pt denies this allergy Colchicine  08/22/2012    Swelling Tramadol Low 01/21/2016    Nausea Only Current Immunizations  Never Reviewed No immunizations on file. Not reviewed this visit You Were Diagnosed With   
  
 Codes Comments Encounter for long-term opiate analgesic use    -  Primary ICD-10-CM: G97.211 ICD-9-CM: V58.69 Spinal stenosis of lumbar region with neurogenic claudication     ICD-10-CM: M48.062 
ICD-9-CM: 724.03 Vitals BP Pulse Temp Resp Height(growth percentile) Weight(growth percentile) 140/56 90 98.2 °F (36.8 °C) 18 5' 10\" (1.778 m) 176 lb (79.8 kg) SpO2 BMI Smoking Status 97% 25.25 kg/m2 Former Smoker BMI and BSA Data Body Mass Index Body Surface Area  
 25.25 kg/m 2 1.99 m 2 Preferred Pharmacy Pharmacy Name Phone [id number] [place]  [phone] Your Updated Medication List  
  
   
This list is accurate as of 7/2/18  2:40 PM.  Always use your most recent med list.  
  
  
  
  
 albuterol 90 mcg/actuation inhaler Commonly known as:  PROVENTIL HFA, VENTOLIN HFA, PROAIR HFA Take 2 Puffs by inhalation every four (4) hours as needed for Wheezing. amLODIPine 10 mg tablet Commonly known as:  Lelia Alka Take 10 mg by mouth daily. aspirin delayed-release 81 mg tablet Take 81 mg by mouth daily. COMBIGAN 0.2-0.5 % Drop ophthalmic solution Generic drug:  brimonidine-timolol Administer 1 Drop to both eyes every twelve (12) hours. diclofenac 1 % Gel Commonly known as:  VOLTAREN  
APPLY SPARINGLY TO AFFECTED AREA(S) ONCE DAILY docusate sodium 100 mg capsule Commonly known as:  COLACE  
TAKE 1 CAPSULE BY MOUTH TWICE DAILY FOR CONSTIPATION, HOLD FOR DIARRHEA. ferrous sulfate 325 mg (65 mg iron) tablet TAKE 1 TABLET BY MOUTH ONCE A DAY  
  
 furosemide 20 mg tablet Commonly known as:  LASIX TAKE 1 TABLET BY MOUTH DAILY  
  
 hydrALAZINE 10 mg tablet Commonly known as:  APRESOLINE  
TAKE 1 TABLET BY MOUTH BEFORE MEALS HYDROcodone-acetaminophen  mg tablet Commonly known as:  Julaine Kava Take 1 Tab by mouth daily as needed for Pain (severe pain). Indications: Pain, chronic pain  
  
 latanoprost 0.005 % ophthalmic solution Commonly known as:  Jaren Benton Administer 1 Drop to both eyes nightly. mirabegron ER 50 mg ER tablet Commonly known as:  MYRBETRIQ Take 1 Tab by mouth daily. mirtazapine 15 mg tablet Commonly known as:  REMERON  
TAKE 1 TABLET BY MOUTH AT BEDTIME. PLEASE CALL MD IF SIDE EFFECTS OCCUR  
  
 nabumetone 750 mg tablet Commonly known as:  RELAFEN  
TAKE 1 TAB BY MOUTH 2 TIMES A DAY  
  
 omeprazole 20 mg capsule Commonly known as:  PRILOSEC Take 20 mg by mouth daily. ONE-TABLET-DAILY tablet Generic drug:  multivitamin Take  by mouth. senna 8.6 mg tablet Commonly known as:  [employer] Take 1 Tab by mouth daily. solifenacin 5 mg tablet Commonly known as:  Florette Labor Take 1 Tab by mouth daily. Prescriptions Printed Refills HYDROcodone-acetaminophen (NORCO)  mg tablet 0 Sig: Take 1 Tab by mouth daily as needed for Pain (severe pain). Indications: Pain, chronic pain  
 Class: Print Route: Oral  
  
We Performed the Following SCHEDULE SURGERY [IOZ2148 Custom] Follow-up Instructions Return in about 3 months (around 10/2/2018) for garay . To-Do List   
 07/02/2018 Lab:  DRUG SCREEN UR - W/ CONFIRM Introducing [facility]! [employer] introduces [employer] patient portal. Now you can access parts of your medical record, email your doctor's office, and request medication refills online. 1. In your internet browser, go to [email or web address] 2. Click on the First Time User? Click Here link in the Sign In box. You will see the New Member Sign Up page. 3. Enter your [employer] Access Code exactly as it appears below. You will not need to use this code after youve completed the sign-up process. If you do not sign up before the expiration date, you must request a new code. · [employer] Access Code: [email or web address] Expires: 9/30/2018  1:33 PM 
 
4. Enter the last four digits of your Social Security Number (xxxx) and Date of Birth (mm/dd/yyyy) as indicated and click Submit. You will be taken to the next sign-up page. 5. Create a [employer] ID. This will be your [employer] login ID and cannot be changed, so think of one that is secure and easy to remember. 6. Create a [employer] password. You can change your password at any time. 7. Enter your Password Reset Question and Answer. This can be used at a later time if you forget your password. 8. Enter your e-mail address. You will receive e-mail notification when new information is available in [address] 9. Click Sign Up. You can now view and download portions of your medical record. 10. Click the Download Summary menu link to download a portable copy of your medical information. If you have questions, please visit the Frequently Asked Questions section of the [employer] website. Remember, [employer] is NOT to be used for urgent needs. For medical emergencies, dial 911. Now available from your iPhone and Android! Please provide this summary of care documentation to your next provider. Your primary care clinician is listed as Jeremy Covington. If you have any questions after today's visit, please call [phone].

## 2018-08-01 NOTE — ED TRIAGE NOTES
Pt in ED with c/o blood in urine pt reports woke up this morning to urinate and blood was dripping pt reports had prostate cancer in 1992.

## 2018-08-01 NOTE — Clinical Note
Status[de-identified] Inpatient [101] Type of Bed: Medical [8] Inpatient Hospitalization Certified Necessary for the Following Reasons: 3. Patient receiving treatment that can only be provided in an inpatient setting (further clarification in H&P documentation) Admitting Diagnosis: Hematuria [[id number]] Admitting Diagnosis: Urinary obstruction [[id number]] Admitting Physician: Susanna Patten [[id number]] Attending Physician: Susanna Patten [[id number]] Estimated Length of Stay: 2 Midnights Discharge Plan[de-identified] Home with Office Follow-up

## 2018-08-01 NOTE — ED PROVIDER NOTES
HPI Comments: Ana M Andino is a 80 y.o. Male with past history of asthma, HTN, renal mass, prostate cancer presenting with 1 hour of acute onset hematuria with no associated symptoms or modifying factors. Pt reports that he got up to urinate this morning and had several drops of blood come out with no urine output. Pt reports he was able to pass urine erlier in the morning with no blood. Pt states he has pressure like he needs to urinate. Pt had some hematuria status post his prostate surgery but not since. Pt denies flank pain, nausea, vomiting, fever, dysuria. No other concerns or symptoms at this time. The history is provided by the patient. Past Medical History:  
Diagnosis Date  Arthritis  Asthma  Back pain  Edema  Glaucoma  Gout  History of phimosis 06/20/2008 S/p circumcision  Hypertension  Low back pain radiating to both legs  Prostate cancer ([address] [id number], s/p RP f/b XRT in 1992 due to PSA recurrence >20 years ago  Renal mass  Sciatica  Spinal stenosis  Thromboembol[address] ([facility] [address] 2014  Thyroid disease Past Surgical History:  
Procedure Laterality Date  HX CIRCUMCISION  06/20/2008  HX KNEE REPLACEMENT    
 HX OTHER SURGICAL  10/92 [facility]-Laser surgery  HX OTHER SURGICAL  8/92 [facility] REMV prostate,retropub, radical  
 
   
Family History:  
Problem Relation Age of Onset  Arthritis-osteo Father  Asthma Other Social History Social History  Marital status:  Spouse name: N/A  
 Number of children: N/A  
 Years of education: N/A Occupational History  Not on file. Social History Main Topics  Smoking status: Former Smoker  Smokeless tobacco: Never Used Comment: Quit in 1953  Alcohol use No  
 Drug use: No  
 Sexual activity: Not on file Other Topics Concern  Not on file Social History Narrative ALLERGIES: Sulfur; Bee pollen; Cephalexin; Colchicine; and Tramadol Review of Systems Constitutional: Positive for fever. Gastrointestinal: Negative for nausea and vomiting. Genitourinary: Positive for hematuria. Negative for dysuria and flank pain. All other systems reviewed and are negative. Vitals:  
 08/01/18 0528 08/01/18 0600 BP: (!) 230/87 (!) 216/54 Pulse: 78 Resp: 16 Temp: 98 °F (36.7 °C) SpO2: 100% 100% Weight: 79.4 kg (175 lb) Height: 5' 10\" (1.778 m) Physical Exam  
Constitutional: He is oriented to person, place, and time. He appears well-developed and well-nourished. No distress. HENT:  
Head: Normocephalic and atraumatic. Eyes: Conjunctivae and EOM are normal. Pupils are equal, round, and reactive to light. Neck: Normal range of motion. Neck supple. Cardiovascular: Normal rate, regular rhythm, S1 normal and S2 normal.  Exam reveals no gallop and no friction rub. No murmur heard. Pulmonary/Chest: Effort normal and breath sounds normal. No accessory muscle usage. No respiratory distress. Abdominal: Soft. Normal appearance. He exhibits no distension. There is no tenderness. There is no rigidity, no rebound and no guarding. Mild suprapubic discomfort with palpation Musculoskeletal: Normal range of motion. He exhibits no edema or tenderness. Neurological: He is alert and oriented to person, place, and time. He has normal strength. No cranial nerve deficit or sensory deficit. Coordination normal.  
Skin: Skin is warm and intact. No rash noted. Psychiatric: He has a normal mood and affect. His speech is normal and behavior is normal.  
Vitals reviewed. MDM Number of Diagnoses or Management Options Gross hematuria:  
Urinary obstruction:  
Diagnosis management comments: Alberto Velasco. is a 80 y.o. Male coming in with hematuria and urinary obstruction for a few hours. HX of prostate cancer, closely followed by Lady [facility]. Also with a hx of renal cyst followed by Kimberlee. Cerda placed and urine flowing well. Will check UA, and plan for outpatient follow up with urology. ED Course Procedures Medications ordered:  
Medications  
sodium chloride 0.9 % bolus infusion 1,000 mL (0 mL IntraVENous IV Completed 8/1/18 0838) Progress notes, Consult notes or additional Procedure notes:  
 
Reevaluation of patient:  
I have reassessed the patient. Patient tried to void again but was only able to void a few drops of red blood and continues to have urge to urinate. Will place 3 way cerda catheter and do continuous bladder irrigation to relieve obstruction. Disposition: 
Diagnosis: 1. Gross hematuria 2. Urinary obstruction Disposition:  
7:00 AM : Pt care transferred to Dr. Kole Dugan  ,ED provider. History of patient complaint(s), available diagnostic reports and current treatment plan has been discussed thoroughly. Bedside rounding on patient occured : yes . Intended disposition of patient : TBD Pending diagnostics reports and/or labs (please list): CBI, and UA Follow-up Information Follow up With Details Comments Contact Info Julia Morgan MD Call in 1 day  [address] [facility] [address] [facility] 
[phone] [facility] EMERGENCY DEPT  As needed, If symptoms worsen [address] [place] [address] [address] Discharge Medication List as of 8/1/2018  8:08 AM  
  
CONTINUE these medications which have NOT CHANGED Details HYDROcodone-acetaminophen (NORCO)  mg tablet Take 1 Tab by mouth daily as needed for Pain (severe pain).  Indications: Pain, chronic pain, Print, Disp-30 Tab, R-0  
  
nabumetone (RELAFEN) 750 mg tablet TAKE 1 TAB BY MOUTH 2 TIMES A DAY, Normal, Disp-60 Tab, R-1  
  
hydrALAZINE (APRESOLINE) 10 mg tablet TAKE 1 TABLET BY MOUTH BEFORE MEALS, Historical Med, R-3  
  
multivitamin (ONE-TABLET-DAILY) tablet Take  by mouth., Historical Med  
  
solifenacin (VESICARE) 5 mg tablet Take 1 Tab by mouth daily. , Normal, Disp-90 Tab, R-3, LACHELLE  
  
diclofenac (VOLTAREN) 1 % gel APPLY SPARINGLY TO AFFECTED AREA(S) ONCE DAILY, Historical Med, R-0  
  
ferrous sulfate 325 mg (65 mg iron) tablet TAKE 1 TABLET BY MOUTH ONCE A DAY, Historical Med, R-0  
  
docusate sodium (COLACE) 100 mg capsule TAKE 1 CAPSULE BY MOUTH TWICE DAILY FOR CONSTIPATION, HOLD FOR DIARRHEA., Historical Med, R-0  
  
mirabegron ER (MYRBETRIQ) 50 mg ER tablet Take 1 Tab by mouth daily. , Print, Disp-90 Tab, R-3  
  
omeprazole (PRILOSEC) 20 mg capsule Take 20 mg by mouth daily. , Historical Med  
  
aspirin delayed-release 81 mg tablet Take 81 mg by mouth daily. , Historical Med  
  
furosemide (LASIX) 20 mg tablet TAKE 1 TABLET BY MOUTH DAILY, Historical Med, R-1  
  
mirtazapine (REMERON) 15 mg tablet TAKE 1 TABLET BY MOUTH AT BEDTIME. Matilda Khoury MD IF SIDE EFFECTS OCCUR, Historical Med, R-1  
  
senna (SENOKOT) 8.6 mg tablet Take 1 Tab by mouth daily. , Print, Disp-14 Tab, R-0  
  
albuterol (PROVENTIL HFA, VENTOLIN HFA, PROAIR HFA) 90 mcg/actuation inhaler Take 2 Puffs by inhalation every four (4) hours as needed for Wheezing., Print, Disp-1 Inhaler, R-0  
  
COMBIGAN 0.2-0.5 % drop ophthalmic solution Administer 1 Drop to both eyes every twelve (12) hours. , Historical Med, LACHELLE  
  
latanoprost (XALATAN) 0.005 % ophthalmic solution Administer 1 Drop to both eyes nightly., Historical Med  
  
amLODIPine (NORVASC) 10 mg tablet Take 10 mg by mouth daily. , Historical Med  
  
  
 
 
 
Scribe Attestation Crissy Helm acting as a scribe for and in the presence of Mirela Mota MD     
August 01, 2018 at 5:41 AM 
    
Provider Attestation:     
I personally performed the services described in the documentation, reviewed the documentation, as recorded by the scribe in my presence, and it accurately and completely records my words and actions.  August 01, 2018 at 5:41 AM - Mierla Mota MD

## 2018-08-01 NOTE — DISCHARGE INSTRUCTIONS
1) Leave the catheter in place for now    2) STOP taking your Aspirin for now. 3) Drink plenty of fluids. Your Urologist wants you to urinate at least 64ounces per day. Please measure it. 4) Call Dr. Katie Orosco today. He wants to see you in the office early next week to have the Catheter taken out. 5) If the catheter becomes glogged/stops flowing, please come back to the ER. 6) Feel free to come back to the ER if you have any problems and concerns. Blood in the Urine: Care Instructions  Your Care Instructions    Blood in the urine, or hematuria, may make the urine look red, brown, or pink. There may be blood every time you urinate or just from time to time. You cannot always see blood in the urine, but it will show up in a urine test.  Blood in the urine may be serious. It should always be checked by a doctor. Your doctor may recommend more tests, including an X-ray, a CT scan, or a cystoscopy (which lets a doctor look inside the urethra and bladder). Blood in the urine can be a sign of another problem. Common causes are bladder infections and kidney stones. An injury to your groin or your genital area can also cause bleeding in the urinary tract. Very hard exercise-such as running a marathon-can cause blood in the urine. Blood in the urine can also be a sign of kidney disease or cancer in the bladder or kidney. Many cases of blood in the urine are caused by a harmless condition that runs in families. This is called benign familial hematuria. It does not need any treatment. Sometimes your urine may look red or brown even though it does not contain blood. For example, not getting enough fluids (dehydration), taking certain medicines, or having a liver problem can change the color of your urine. Eating foods such as beets, rhubarb, or blackberries or foods with red food coloring can make your urine look red or pink. Follow-up care is a key part of your treatment and safety.  Be sure to make and go to all appointments, and call your doctor if you are having problems. It's also a good idea to know your test results and keep a list of the medicines you take. When should you call for help? Call your doctor now or seek immediate medical care if:    · You have symptoms of a urinary infection. For example:  ¨ You have pus in your urine. ¨ You have pain in your back just below your rib cage. This is called flank pain. ¨ You have a fever, chills, or body aches. ¨ It hurts to urinate. ¨ You have groin or belly pain.     · You have more blood in your urine.    Watch closely for changes in your health, and be sure to contact your doctor if:    · You have new urination problems.     · You do not get better as expected. Where can you learn more? Go to http://angelica-vishnu.info/. Enter H666 in the search box to learn more about \"Blood in the Urine: Care Instructions. \"  Current as of: May 12, 2017  Content Version: 11.7  © 2668-4210 [employer]. Care instructions adapted under license by [employer] (which disclaims liability or warranty for this information). If you have questions about a medical condition or this instruction, always ask your healthcare professional. [address] any warranty or liability for your use of this information.

## 2018-08-01 NOTE — ED NOTES
7:11 AM :Pt care assumed from Dr. Jake Schrader , ED provider. Pt complaint(s), current treatment plan, progression and available diagnostic results have been discussed thoroughly. Rounding occurred: yes Intended Disposition: Home Pending diagnostic reports and/or labs (please list): Reevaluate patient 8:01 AM Consult: I discussed care with Dr. Isma Tsai (Urology). It was a standard discussion including patient history, chief complaint, available diagnostic results, and predicted treatment course. Recommends keeping the cerda in place with a leg bag, encouraged patient to stop taking Aspirin, drink plenty of fluids and to come see him in the office next week. Will discharge. Scribe Attestation Charity Zavala acting as a scribe for and in the presence of Oksana Holder MD     
August 01, 2018 at 8:12 AM 
    
Provider Attestation:     
I personally performed the services described in the documentation, reviewed the documentation, as recorded by the scribe in my presence, and it accurately and completely records my words and actions.  August 01, 2018 at 8:12 AM - Oksana Holder MD

## 2018-08-02 PROBLEM — N13.9 URINARY OBSTRUCTION: Status: ACTIVE | Noted: 2018-01-01

## 2018-08-02 PROBLEM — R31.9 HEMATURIA: Status: ACTIVE | Noted: 2018-01-01

## 2018-08-02 PROBLEM — R31.0 HEMATURIA, GROSS: Status: ACTIVE | Noted: 2018-01-01

## 2018-08-02 NOTE — CONSULTS
1. Hematuria, unspecified type 2. Urinary obstruction ASSESSMENT:  
GROSS HEMATURIA:  ?  RADIATION CYSTITIS,  ?   UTI,   ? BLADDER TUMOR PLAN:   
HYDRATION 
ANALAGESIA 
ANTIBIOTICS  PENDING C/S 
WILL NEED CYSTO, BILAT RETRO,  ?  TURBT  PRIOR TO D/C 
FOLLOW  H/H Wero Schreiber MD 
 
[phone] Chief Complaint Patient presents with  Urinary Retention HISTORY OF PRESENT ILLNESS:  Benny Pritchard is a 80 y.o. male who is seen in consultation as referred by Lilia Suarez  for  GROSS HEMATURIA. HPI: 
  
Clinical Stage [id number], Unknown Grade Adenocarcinoma of the Prostate, diagnosed  Negative NM Bone Scan 17 Positive MRI Thoracic Spine 10/29/14  metastatic disease T8 S/p radical prostatectomy ~ S/p XRT ~ On Intermittent ADT, started 10/2014 Last Eligard: 17 Last Xgeva: 10/19/16 PSA lalit: < 0.03 ng/mL (3/3/15) Most recent PSA: <0.1 ng/mL (18) ECO 
 
welll known  to me,  s/p  radical prostatectomy in  ,   with recurrence,  on ADT 
admitted  with gross hematuria 
no flank  pain 
no fever or chills AUA Symptom Score 2017 Over the past month how often have you had the sensation that your bladder was not completely empty after you finished urinating? 3 Over the past month, how often have had to urinate again less than 2 hours after you last finished urinating? 3 Over the past month, how often have you found you stopped and started again several times when you urinated? 3 Over the past month, how often have you found it difficult to postpone urination? 3 Over the past month, how often have you had a weak urinary stream? 3 Over the past month, how often have you had to push or strain to begin urinating? 3 Over the past month, how many times did you most typically get up to urinate from the time you went to bed at night until the time you got up in the morning? 3  
AUA Score 21 If you were to spend the rest of your life with your urinary condition the way it is now, how would you feel about that? Mixed-about equally satisfied Past Medical History:  
Diagnosis Date  Arthritis  Asthma  Back pain  Edema  Glaucoma  Gout  History of phimosis 06/20/2008 S/p circumcision  Hypertension  Low back pain radiating to both legs  Prostate cancer ([facility] [address] [id number], s/p RP f/b XRT in 1992 due to PSA recurrence >20 years ago  Renal mass  Sciatica  Spinal stenosis  Thromboembolus ([facility] [address] 2014  Thyroid disease Past Surgical History:  
Procedure Laterality Date  HX CIRCUMCISION  06/20/2008  HX KNEE REPLACEMENT    
 HX OTHER SURGICAL  10/92 [facility]-Laser surgery  HX OTHER SURGICAL  8/92 [facility] prostate,retropub, radical  
 
 
Social History Substance Use Topics  Smoking status: Former Smoker  Smokeless tobacco: Never Used Comment: Quit in 1953  Alcohol use No  
 
 
Allergies Allergen Reactions  Sulfur Rash  Bee Pollen Runny Nose  Cephalexin Swelling Pt denies this allergy  Colchicine Swelling  Tramadol Nausea Only Family History Problem Relation Age of Onset  Arthritis-osteo Father  Asthma Other Current Outpatient Prescriptions Medication Sig Dispense Refill  
 HYDROcodone-acetaminophen (NORCO)  mg tablet Take 1 Tab by mouth daily as needed for Pain (severe pain). Indications: Pain, chronic pain 30 Tab 0  
 nabumetone (RELAFEN) 750 mg tablet TAKE 1 TAB BY MOUTH 2 TIMES A DAY 60 Tab 1  
 hydrALAZINE (APRESOLINE) 10 mg tablet TAKE 1 TABLET BY MOUTH BEFORE MEALS  3  
 multivitamin (ONE-TABLET-DAILY) tablet Take  by mouth.  solifenacin (VESICARE) 5 mg tablet Take 1 Tab by mouth daily.  90 Tab 3  
 diclofenac (VOLTAREN) 1 % gel APPLY SPARINGLY TO AFFECTED AREA(S) ONCE DAILY  0  
 ferrous sulfate 325 mg (65 mg iron) tablet TAKE 1 TABLET BY MOUTH ONCE A DAY  0  
 docusate sodium (COLACE) 100 mg capsule TAKE 1 CAPSULE BY MOUTH TWICE DAILY FOR CONSTIPATION, HOLD FOR DIARRHEA.  0  
 mirabegron ER (MYRBETRIQ) 50 mg ER tablet Take 1 Tab by mouth daily. 90 Tab 3  
 omeprazole (PRILOSEC) 20 mg capsule Take 20 mg by mouth daily.  aspirin delayed-release 81 mg tablet Take 81 mg by mouth daily.  furosemide (LASIX) 20 mg tablet TAKE 1 TABLET BY MOUTH DAILY  1  
 mirtazapine (REMERON) 15 mg tablet TAKE 1 TABLET BY MOUTH AT BEDTIME. PLEASE CALL MD IF SIDE EFFECTS OCCUR  1  
 senna (SENOKOT) 8.6 mg tablet Take 1 Tab by mouth daily. 14 Tab 0  
 albuterol (PROVENTIL HFA, VENTOLIN HFA, PROAIR HFA) 90 mcg/actuation inhaler Take 2 Puffs by inhalation every four (4) hours as needed for Wheezing. 1 Inhaler 0  
 COMBIGAN 0.2-0.5 % drop ophthalmic solution Administer 1 Drop to both eyes every twelve (12) hours.  latanoprost (XALATAN) 0.005 % ophthalmic solution Administer 1 Drop to both eyes nightly.  amLODIPine (NORVASC) 10 mg tablet Take 10 mg by mouth daily. Review of Systems ROS is: 
 
 
 
Positive for:    hematuria,  chronic low  back pain PHYSICAL EXAMINATION:  
Visit Vitals  /64 (BP 1 Location: Left arm, BP Patient Position: At rest)  Pulse 71  Temp 97.8 °F (36.6 °C)  Resp 18  SpO2 99% Constitutional: Well developed, well nourished male. No acute distress. HEENT: Normocephalic, Atraumatic, EOM's intact CV:  RRR NL 
Respiratory: No respiratory distress or difficulties breathing Abdomen:  No abdominal masses or tenderness.  Male:  No CVA tenderness CLAUDIO:Perineum normal to visual inspection, no erythema or irritation, Sphincter with good tone, Rectum with no hemorrhoids, fissures or masses, Prostate smooth, symmetric and anodular. Prostate is ABSENT, NO MASSES 
SCROTUM:  No scrotal rash or lesions noticed. Normal bilateral testes and epididymis. PENIS: Urethral meatus normal in location and size.  No urethral discharge. Circumsized Skin: No evidence of jaundice. Normal color Neuro/Psych:  Alert and oriented. Affect appropriate. Lymphatic:   No enlarged inguinal lymph nodes. REVIEW OF LABS AND IMAGING:   
 
Labs: Results:  
Chemistry Recent Labs 08/01/18 
 0600 GLU  111* NA  142  
K  4.7 CL  105 CO2  29 BUN  45* CREA  2.55* CA  9.7 AGAP  8  
BUCR  18 CBC w/Diff Recent Labs 08/02/18 
 0700  08/01/18 
 0600 WBC   --   5.0  
RBC   --   3.45* HGB  10.5*  10.5* HCT   --   31.4* PLT   --   246 GRANS   --   35* LYMPH   --   47 EOS   --   8* Cultures No results for input(s): CULT in the last 72 hours. All Micro Results None Urinalysis Color Date Value Ref Range Status 08/01/2018 RED   Final  
 
Appearance Date Value Ref Range Status 08/01/2018 CLOUDY   Final  
 
Specific gravity Date Value Ref Range Status 08/01/2018 1.015 1.003 - 1.030   Final  
 
pH (UA) Date Value Ref Range Status 08/01/2018 7.0 5.0 - 8.0   Final  
 
Protein Date Value Ref Range Status 08/01/2018 100 (A) NEG mg/dL Final  
 
Ketone Date Value Ref Range Status 08/01/2018 NEGATIVE  NEG mg/dL Final  
 
Bilirubin Date Value Ref Range Status 08/01/2018 NEGATIVE  NEG   Final  
 
Blood Date Value Ref Range Status 08/01/2018 LARGE (A) NEG   Final  
 
Urobilinogen Date Value Ref Range Status 08/01/2018 0.2 0.2 - 1.0 EU/dL Final  
 
Nitrites Date Value Ref Range Status 08/01/2018 NEGATIVE  NEG   Final  
 
Leukocyte Esterase Date Value Ref Range Status 08/01/2018 NEGATIVE  NEG   Final  
 
Potassium Date Value Ref Range Status 08/01/2018 4.7 3.5 - 5.5 mmol/L Final  
 
Creatinine Date Value Ref Range Status 08/01/2018 2.55 (H) 0.6 - 1.3 MG/DL Final  
 
BUN Date Value Ref Range Status 08/01/2018 45 (H) 7.0 - 18 MG/DL Final  
 
Prostate Specific Ag Date Value Ref Range Status 09/15/2014 15.80 (A) 0.00 - 4.00 ng/mL Final  
 PSA No results for input(s): PSA in the last 72 hours. Coagulation Lab Results Component Value Date/Time  Prothrombin time 13.3 08/02/2018 07:00 AM  
 INR 1.0 08/02/2018 07:00 AM  
 aPTT 32.6 08/02/2018 07:00 AM

## 2018-08-02 NOTE — PROGRESS NOTES
Bedside and Verbal shift change report given to TIERRA Arana (oncoming nurse) by Liset Britt RN (offgoing nurse). Report included the following information SBAR, Kardex, Intake/Output, MAR and Recent Results.

## 2018-08-02 NOTE — PROGRESS NOTES
visited patient to discuss an Advance Medical Directive. Patient said his daughter, who he says has legal and medical power of , will be visiting later. He asked  to provide a blank Advance Medical Directive which was left in the room so that when his daughter comes they can discuss the best way for this hospital to have a copy of his Advance Medical Directive whether it is to procure it from his safety deposit box or to complete a new one provided by this hospital. Nilda Rendon will continue to provide pastoral care as needed as requested. Dylan Kam MDiv, [address] [facility] Office [phone]

## 2018-08-02 NOTE — DISCHARGE SUMMARY
Death Summary [address] Patient: Sarah Benitez Sr. Age: 80 y.o. Sex: male  : 1930 MRN: [id number] DOA: 2018 Death Date: 18 Attending:No att. providers found PCP: Concha Niño MD     
 
================================================================ Reason for Admission: 
Hematuria, gross Urinary obstruction Consultants: 
Dr. Katie Orosco (Urology) Brief Hospital Course (including pertinent history and physical findings) Gross Hematuria Pt was admitted with gross hematuria likely 2/2 radiation cystitis as in the past.  3-way catheter was placed and was on continuous irrigation as per Dr. Katie Orosco, Urologist.  Evening of  pt started having increasing painful clots, Dr. Katie Orosco then planned to scope . 
Pt was seen in room with RN soon thereafter, was A&OX3, somewhat uncomfortable, and c/o suprapubic abd pain and nausea, abd was soft but tender. Morphine and Zofran ordered for pt's discomfort and given. He was reportedly more comfortable afterwards and was able to go to sleep for a bit. RN checked on pt later that evening and found him with copious emesis covering his mouth and neck, apneic and pulseless. Code blue then called. Pt remained in asystole with intermittent PEA lasting <10seconds, returning to asystole with the code concluding at [address] . Summarized key findings and results (labs, imaging studies, ECHO, cardiac cath, endoscopies, etc): CBC w/Diff Lab Results Component Value Date/Time WBC 10.9 2018 12:33 AM  
 Hemoglobin, POC 10.2 (L) 2017 02:24 PM  
 HGB 7.0 (L) 2018 12:33 AM  
 Hematocrit, POC 30 (L) 2017 02:24 PM  
 HCT 21.9 (L) 2018 12:33 AM  
 PLATELET 623 10/59/8413 12:33 AM  
 MCV 94.0 2018 12:33 AM  
   
 
 
Chemistry Lab Results Component Value Date/Time  Sodium 135 (L) 2018 12:33 AM  
 Potassium 4.6 2018 12:33 AM  
 Chloride 106 08/05/2018 12:33 AM  
 CO2 21 08/05/2018 12:33 AM  
 Anion gap 8 08/05/2018 12:33 AM  
 Glucose 294 (H) 08/05/2018 12:33 AM  
 BUN 39 (H) 08/05/2018 12:33 AM  
 Creatinine 2.18 (H) 08/05/2018 12:33 AM  
 BUN/Creatinine ratio 18 08/05/2018 12:33 AM  
 GFR est AA 35 (L) 08/05/2018 12:33 AM  
 GFR est non-AA 29 (L) 08/05/2018 12:33 AM  
 Calcium 7.3 (L) 08/05/2018 12:33 AM  
   
 
 
================================================================ Dellie Nail, DO, PGY I 
[facility] 
08/05/18 4:45 PM

## 2018-08-02 NOTE — H&P
Admission History and Physical 
[address] [facility] Patient: Flip Reyna MRN: [id number]  CSN: [id number] YOB: 1930  Age: 80 y.o. Sex: male DOA: 8/1/2018 HPI:  
 
Flip Reyna is a 80 y.o. male with PMH Prostate CA s/p radical prostatectomy (1992, chemo and radiation), HTN, now presenting with complaint of gross hematuria. Pt came in to ED complaining of asymptomatic hematuria yesterday, he was sent home with a catheter. Shortly after he returned to the ED with pain and blood. Urology was consulted, a 3-way catheter was placed under morphine. Pt states he has had multiple episodes since 1992 where he is typically given a 3-way cath and irrigated. However, none have been this painful. He has had no associated pain, light headedness, dizziness. This was sudden in onset. He is followed by Dr. Shonda Simms. Has had positive metastatic MRI 2014 and negative bone scan 2017. D/c leuprolide therapy earlier this year d/t good response. ED Course: 
 Labs CBC (Hb 10.5>9.1), BMP (BUN 45, Cr 2.55, GFR 24) 3-way catheter was placed and is draining Urinalysis (Red, Blood, no Ketones, LE) Review of Systems Constitutional: Negative for fever, chills and diaphoresis. HENT: Negative for hearing loss and sore throat. Eyes: Negative for blurred vision and double vision. Respiratory: Negative for cough and hemoptysis. Positive for URI. Cardiovascular: Negative for chest pain and palpitations. Gastrointestinal: Negative for nausea and vomiting. Genitourinary: Negative for dysuria and Positive hematuria. Musculoskeletal: Negative for myalgias and joint pain. Skin: Negative for itching and rash. Neurological: Negative for dizziness and headaches. Psychiatric: Negative for depression and suicidal ideas. Past Medical History:  
Diagnosis Date  Arthritis  Asthma  Back pain  Edema  Glaucoma  Gout  History of phimosis 06/20/2008 S/p circumcision  Hypertension  Low back pain radiating to both legs  [facility] [id number], s/p RP f/b XRT in 1992 due to PSA recurrence >20 years ago  Renal mass  Sciatica  Spinal stenosis  Thromboembolus ([facility] 2014  Thyroid disease Past Surgical History:  
Procedure Laterality Date  HX CIRCUMCISION  06/20/2008  HX KNEE REPLACEMENT    
 HX OTHER SURGICAL  10/92 [facility]-Laser surgery  HX OTHER SURGICAL  8/92 Z_PR REMV prostate,retropub, radical  
 
 
Family History Problem Relation Age of Onset  Arthritis-osteo Father  Asthma Other Social History Social History  Marital status:  Spouse name: N/A  
 Number of children: N/A  
 Years of education: N/A Social History Main Topics  Smoking status: Former Smoker  Smokeless tobacco: Never Used Comment: Quit in 1953  Alcohol use No  
 Drug use: No  
 Sexual activity: Not on file Other Topics Concern  Not on file Social History Narrative Allergies Allergen Reactions  Sulfur Rash  Bee Pollen Runny Nose  Cephalexin Swelling Pt denies this allergy  Colchicine Swelling  Tramadol Nausea Only Prior to Admission Medications Prescriptions Last Dose Informant Patient Reported? Taking? COMBIGAN 0.2-0.5 % drop ophthalmic solution   Yes No  
Sig: Administer 1 Drop to both eyes every twelve (12) hours. HYDROcodone-acetaminophen (NORCO)  mg tablet   No No  
Sig: Take 1 Tab by mouth daily as needed for Pain (severe pain). Indications: Pain, chronic pain  
albuterol (PROVENTIL HFA, VENTOLIN HFA, PROAIR HFA) 90 mcg/actuation inhaler   No No  
Sig: Take 2 Puffs by inhalation every four (4) hours as needed for Wheezing. amLODIPine (NORVASC) 10 mg tablet   Yes No  
Sig: Take 10 mg by mouth daily. aspirin delayed-release 81 mg tablet   Yes No  
Sig: Take 81 mg by mouth daily.   
diclofenac (VOLTAREN) 1 % gel   Yes No  
Sig: APPLY SPARINGLY TO AFFECTED AREA(S) ONCE DAILY docusate sodium (COLACE) 100 mg capsule   Yes No  
Sig: TAKE 1 CAPSULE BY MOUTH TWICE DAILY FOR CONSTIPATION, HOLD FOR DIARRHEA. ferrous sulfate 325 mg (65 mg iron) tablet   Yes No  
Sig: TAKE 1 TABLET BY MOUTH ONCE A DAY  
furosemide (LASIX) 20 mg tablet   Yes No  
Sig: TAKE 1 TABLET BY MOUTH DAILY  
hydrALAZINE (APRESOLINE) 10 mg tablet   Yes No  
Sig: TAKE 1 TABLET BY MOUTH BEFORE MEALS  
latanoprost (XALATAN) 0.005 % ophthalmic solution   Yes No  
Sig: Administer 1 Drop to both eyes nightly. mirabegron ER (MYRBETRIQ) 50 mg ER tablet   No No  
Sig: Take 1 Tab by mouth daily. mirtazapine (REMERON) 15 mg tablet   Yes No  
Sig: TAKE 1 TABLET BY MOUTH AT BEDTIME. Nalini Talbert MD IF SIDE EFFECTS OCCUR  
multivitamin (ONE-TABLET-DAILY) tablet   Yes No  
Sig: Take  by mouth. nabumetone (RELAFEN) 750 mg tablet   No No  
Sig: TAKE 1 TAB BY MOUTH 2 TIMES A DAY  
omeprazole (PRILOSEC) 20 mg capsule   Yes No  
Sig: Take 20 mg by mouth daily. senna (SENOKOT) 8.6 mg tablet   No No  
Sig: Take 1 Tab by mouth daily. solifenacin (VESICARE) 5 mg tablet   No No  
Sig: Take 1 Tab by mouth daily. Facility-Administered Medications: None Physical Exam:  
 
Patient Vitals for the past 24 hrs: 
 Temp Pulse Resp BP SpO2  
08/02/18 1118 97 °F (36.1 °C) 72 18 189/67 97 % 08/02/18 0933 96.8 °F (36 °C) 71 18 170/62 97 % 08/02/18 0749 97 °F (36.1 °C) 72 16 163/59 98 % 08/01/18 2305 97.8 °F (36.6 °C) 71 18 187/64 99 % Physical Exam: 
General:  Alert and Responsive and in No acute distress. HEENT: Conjunctiva pink, sclera anicteric. EOMI. Pharynx moist, nonerythematous. Moist mucous membranes. No other gross abnormalities appreciated. CV:  RRR w/o MRGs. No visible pulsations or thrills. No JVD RESP:  Unlabored breathing. Lungs clear to auscultation. No wheeze, rales, or rhonchi. Equal expansion bilaterally.  
ABD:  Soft, nontender, nondistended. Normoactive bowel sounds. No hepatosplenomegaly. Suprapubic tenderness. RECTAL:  Patient Denied : No penile lesions, catheter in place MS:  No joint deformity or instability. No atrophy. Neuro:  Cranial nerves grossly intact, grossly moving upper and lower extremities. Ext:  BL 1+ pitting edema (chronic). 2+ radial and dp pulses bilaterally. Skin:  No rashes, lesions, or ulcers. Good turgor. IMAGING: No results found. Recent Results (from the past 12 hour(s)) HEMOGLOBIN Collection Time: 08/02/18  7:00 AM  
Result Value Ref Range HGB 10.5 (L) 13.0 - 16.0 g/dL PTT Collection Time: 08/02/18  7:00 AM  
Result Value Ref Range aPTT 32.6 23.0 - 36.4 SEC PROTHROMBIN TIME + INR Collection Time: 08/02/18  7:00 AM  
Result Value Ref Range Prothrombin time 13.3 11.5 - 15.2 sec INR 1.0 0.8 - 1.2    
CBC W/O DIFF Collection Time: 08/02/18  7:00 AM  
Result Value Ref Range WBC 7.0 4.6 - 13.2 K/uL  
 RBC 3.54 (L) 4.70 - 5.50 M/uL  
 HGB 10.5 (L) 13.0 - 16.0 g/dL HCT 32.5 (L) 36.0 - 48.0 % MCV 91.8 74.0 - 97.0 FL  
 MCH 29.7 24.0 - 34.0 PG  
 MCHC 32.3 31.0 - 37.0 g/dL  
 RDW 13.0 11.6 - 14.5 % PLATELET 213 773 - 527 K/uL MPV 9.1 (L) 9.2 - 11.8 FL  
HGB & HCT Collection Time: 08/02/18 12:00 PM  
Result Value Ref Range HGB 9.1 (L) 13.0 - 16.0 g/dL HCT 27.3 (L) 36.0 - 48.0 % Assessment/Plan:  
80 y.o. male with PMH with PMH Prostate CA s/p radical prostatectomy (1992), HTN, now presenting with complaint of gross hematuria. Gross Hematuria - Radiation cystitis vs Tumor. He is former smoker, and hx of cancer w/ mets - at risk for bladder CA. Has hx of radiation cystitis. Chronic problem for pt, followed by Dr. Hillary Rinne. Seems to typically be d/t radiation cystitis. Typically gets 3-way catheter and cystoscopy. However, was sent home from ED with cerda catheter which likely obstructed and caused the pain.   Urology plans to observe today, NPO after midnight and cystoscopy tomorrow. - admit to floor 
- CBC, BMP daily 
- H/H q6hrs until stable after 3. Then qd 
- Urology consult 
- NPO after midnight for cystoscopy tomorrow 
- UA, Cx 
- 3-way catheter w/ irigation ESTEVAN in CKD3- Cr 2.55. Baseline seems to be ~2.  Likely 2/2 hematuria. Has hx of not tolerating ACEi (hyperkalemic) - NS 125ml/hr 
- hold lasix 
- as above HTN urgency - 180s/60s Likely 2/2 pain. Control pain with 3-way cath irrigation, diluting clot passage 
- continue amlodipine 10mg, hydralazine 10mg 
- monitor Glaucoma - controlled on combigan and Latanoprost 
- continue as per pharmacy Constipation - pt states is on senna and colace at home 
- continue colace 
- hold senna - probiotic GERD - controlled with home Omeprazole 40mg - Protonix 40mg Diet: Regular, NPO After midnight DVT Prophylaxis: SCD Code Status: Full Point of Contact: Radha Crawford (Daughter) [phone] Disposition and anticipated LOS: 1 night Mechelle Mendiola DO, PGY I 
[facility] 
08/02/18 
9:25 AM 
 
 
Admission History and Physical 
[facility] 
  
  
Patient: German Scott MRN: [id number]  CSN: [id number] YOB: 1930  Age: 80 y.o. Sex: male   
  
DOA: 8/1/2018 
   
HPI:  
  
Leah Hollis. is a 80 y.o. male with PMH prostate cancer s/p chemo radiation and prostatectomy, bladder outlet obstruction, CKD3, now presenting with complaint of gross hematuria for 2 days. 
  
Pt woke up yesterday with blood in the urine and developed pain in the abdomen and had very low flow of urine. He went to ED and had a cerda placed. He reports they could not place the 3-way catheter and placed a regular cerda and placement was pain full. Following placement he passed a blood clot followed by a large amount of urine and felt much better.  He continued to have a lot of urine pass but then began to pass a lot of blood for several hours but it then again slowed with clots and became painful. In ED a 3 way cerda cath was successfully placed and this bladder irrigation urine passed and pain has subsided.  
  
  
Pt with a  TURP in 1992 followed by chemo and radiation. Pt with Hx metastatic disease now with negative NM bone scan in 2017. He has been in remission. Has intermittent androgen deprivation therapy last done in 2/2019 (leuprolide). No hx of glomerular disease or bladder disease. He denies any systemic symptoms of infection or frequency dysuria proceeding this event. He does report difficult to pass stools the day prior to current symptoms 
  
ED Course: 3 way cerda placed bladder irrigation, morphin 
  
Review of Systems -  
General ROS: negative for  - chills, fever, night sweats, weight gain and weight loss Psychological ROS: negative for - anxiety and depression Ophthalmic ROS: negative for - blurry vision, decreased vision or loss of vision ENT ROS: negative for pos for chronic post nasal drip - headaches, hearing change or visual changes Hematological and Lymphatic ROS: negative for - bruising, jaundice Respiratory ROS: negative for - cough, hemoptysis, shortness of breath, orthopnea, paroxysmal dyspnea, or wheezing Cardiovascular ROS: negative for - chest pain, dyspnea on exertion, edema, loss of consciousness, or palpitations Gastrointestinal ROS: pos constipation negative for - abdominal pain, blood in stools, change in stools,, diarrhea, hematemesis, melena, nausea/vomiting or swallowing difficulty/pain Genito-Urinary ROS: see HPI Musculoskeletal ROS: some joint deformity form arthritis negative for - joint pain, joint swelling or muscle pain Neurological ROS: negative for - dizziness, headaches, numbness/tingling or weakness Dermatological ROS: negative for - rash or skin lesion changes 
  
  
    
Past Medical History:  
Diagnosis Date  Arthritis    
 Asthma    
 Back pain    
 Edema    
 Glaucoma    
 Gout    
 History of phimosis 06/20/2008  
  S/p circumcision  Hypertension    
 Low back pain radiating to both legs    
 [facility]    
  [id number], s/p RP f/b XRT in 1992 due to PSA recurrence >20 years ago  Renal mass    
 Sciatica    
 Spinal stenosis    
 Thromboembolus ([facility] 2014  Thyroid disease    
 
     
Past Surgical History:  
Procedure Laterality Date  HX CIRCUMCISION   06/20/2008  HX KNEE REPLACEMENT      
 HX OTHER SURGICAL   10/92  
  [facility]-Laser surgery  HX OTHER SURGICAL   8/92  
  Z_PR REMV prostate,retropub, radical  
  
     
Family History Problem Relation Age of Onset  Arthritis-osteo Father    
 Asthma Other    
  
  
 Social History  
  
   
     
Social History  Marital status:   
    Spouse name: N/A  
 Number of children: N/A  
 Years of education: N/A  
  
      
Social History Main Topics  Smoking status: Former Smoker  Smokeless tobacco: Never Used  
      Comment: Quit in 1953  Alcohol use No  
 Drug use: No  
 Sexual activity: Not on file  
  
    
Other Topics Concern  Not on file  
  
Social History Narrative  
  
  
  
     
Allergies Allergen Reactions  Sulfur Rash  Bee Pollen Runny Nose  Cephalexin Swelling  
    Pt denies this allergy  Colchicine Swelling  Tramadol Nausea Only  
  
See Intern Note Physical Exam:  
  
Patient Vitals for the past 24 hrs: 
  Temp Pulse Resp BP SpO2  
08/01/18 2305 97.8 °F (36.6 °C) 71 18 187/64 99 %   
  
Physical Exam: 
General:  Alert and Responsive and in No acute distress. HEENT: Conjunctiva pink, sclera anicteric. EOMI. Pharynx moist, nonerythematous. Moist mucous membranes. No other gross abnormalities present. CV:  RRR, no murmurs. No visible pulsations or thrills. RESP:  Unlabored breathing. Lungs clear to auscultation. no wheeze, rales, or rhonchi. Equal expansion bilaterally. ABD:  Soft, nontender, nondistended. No hepatosplenomegaly. No suprapubic tenderness.   No CVA tenderness. RECTAL: see intern note MS:  joint deformity left thumb. No atrophy. : no suprapubic tenderness, cerda bag with grossly red fluid Neuro:  5/5 strength bilateral upper extremities and lower extremities. A+Ox3. Ext:  No edema. 2+ radial and dp pulses bilaterally. Skin:  No rashes, lesions, or ulcers. Good turgor. 
  
IMAGING: none 
  
 Recent Results Recent Results (from the past 12 hour(s)) HEMOGLOBIN  
  Collection Time: 08/02/18  7:00 AM  
Result Value Ref Range  
  HGB 10.5 (L) 13.0 - 16.0 g/dL PTT  
  Collection Time: 08/02/18  7:00 AM  
Result Value Ref Range  
  aPTT 32.6 23.0 - 36.4 SEC PROTHROMBIN TIME + INR  
  Collection Time: 08/02/18  7:00 AM  
Result Value Ref Range  
  Prothrombin time 13.3 11.5 - 15.2 sec  
  INR 1.0 0.8 - 1.2    
  
  
  
  
Assessment/Plan:  
80 y.o. male with PMH prostate cancer s/p chemo radiation and prostatectomy, bladder outlet obstruction, CKD3, now presenting with complaint of gross hematuria for 2 days. 
  
Gross hematuria: hx prostate cancer with radiation therapy, most recent urology visit with [id number] s/p chemo and radiation 20 years ago Eligard last in 2017 folowed by intermittent androgen supression therapy, urge incontinance. Possible eitiologies include glomeular disease, bladder cancer, prostate disesase or infection, kidney stones, radiation cycstitis, UTI. 
--urology consulted, likely need for cystoscopy 
--FU urology recs --serial H and H q6 for 12hrs then q12hrs or q24hrs 
--UA 
--PSA 
--Ucx 
--may consider pyridium but unlikely to be effective such a low GFR 
--liberal pain managment 
  
Hypertensive urgency/poorly controlled HTN: does nto tolerate ACEI 
--goal <152/20 systolic 153 or arrival 
--continue home meds amlodipine, hydralazine,  
  
Chronic Kindey disease stage 3, with some evidence of mild acute injury. Suspect post renal injury for obstruction.  Carrillo not do well with Aces (hyperkalemia) 
--followed by  Casteneres --daily BMP 
--hold nephrotoxic substances 
--hydration 
--hold lasix 20mg 
  
Appetite and weight Maitiance 
--mirtazapine 
--omeprazole 
  
  
  
Diet: regular DVT Prophylaxis: SCDs   
  
Disposition and anticipated LOS: 1-2 midnights to home 
  
Kisha Nichole MD PGY3 [facility]

## 2018-08-02 NOTE — ED NOTES
TRANSFER - OUT REPORT: 
 
Verbal report given to Jas Serrato RN (name) on [address] [facility].  being transferred to [facility] (unit) for routine progression of care Report consisted of patients Situation, Background, Assessment and  
Recommendations(SBAR). Information from the following report(s) SBAR, Kardex, ED Summary, Intake/Output, MAR and Recent Results was reviewed with the receiving nurse. Lines:  
Peripheral IV 08/02/18 Left Forearm (Active) Site Assessment Clean, dry, & intact 8/2/2018  7:00 AM  
Phlebitis Assessment 0 8/2/2018  7:00 AM  
Infiltration Assessment 0 8/2/2018  7:00 AM  
Dressing Status Clean, dry, & intact 8/2/2018  7:00 AM  
Dressing Type Transparent 8/2/2018  7:00 AM  
Hub Color/Line Status Pink;Flushed;Patent 8/2/2018  7:00 AM  
  
 
Opportunity for questions and clarification was provided. Patient transported with: 
 [employer]

## 2018-08-02 NOTE — ED TRIAGE NOTES
Patient A/O x 4, complaining of back pain and bladder pain x this afternoon. Patient states he was seen earlier today for urinary retention, patient states he has a cerda catheter in connected to a leg bag and there were blood clots. Patient states he was discharged today after catheter was irrigated and he had urine flow. Patient states after he got home he noticed it wasn't flowing again. Patient states he noticed drops of blood to bag.

## 2018-08-02 NOTE — ED PROVIDER NOTES
HPI Comments: 11:49 PM Chetan Freeman Sr. is a 80 y.o. male who presents to the ED c/o decreased urinary output from his cerda catheter that started earlier today. He reports that he was seen in the ED earlier today and had a catheter placed and he was discharged with a leg bag. He states that the cerda was initially draining urine, but now it is only draining a small amount of blood. Since he noted the blood and decreased output he has developed lower back pain. He denies a hx of back pain, N/V/D, and any further complaints. The history is provided by the patient. Past Medical History:  
Diagnosis Date  Arthritis  Asthma  Back pain  Edema  Glaucoma  Gout  History of phimosis 06/20/2008 S/p circumcision  Hypertension  Low back pain radiating to both legs  Prostate cancer ([facility] [id number], s/p RP f/b XRT in 1992 due to PSA recurrence >20 years ago  Renal mass  Sciatica  Spinal stenosis  Thromboembolus ([facility] 2014  Thyroid disease Past Surgical History:  
Procedure Laterality Date  HX CIRCUMCISION  06/20/2008  HX KNEE REPLACEMENT    
 HX OTHER SURGICAL  10/92 [facility]-Laser surgery  HX OTHER SURGICAL  8/92 Z_PR REMV prostate,retropub, radical  
 
   
Family History:  
Problem Relation Age of Onset  Arthritis-osteo Father  Asthma Other Social History Social History  Marital status:  Spouse name: N/A  
 Number of children: N/A  
 Years of education: N/A Occupational History  Not on file. Social History Main Topics  Smoking status: Former Smoker  Smokeless tobacco: Never Used Comment: Quit in 1953  Alcohol use No  
 Drug use: No  
 Sexual activity: Not on file Other Topics Concern  Not on file Social History Narrative ALLERGIES: Sulfur; Bee pollen; Cephalexin; Colchicine; and Tramadol Review of Systems Constitutional: Negative.   Negative for chills and fever. HENT: Negative. Negative for congestion. Eyes: Negative. Negative for visual disturbance. Respiratory: Negative. Negative for shortness of breath. Cardiovascular: Negative. Negative for chest pain and leg swelling. Gastrointestinal: Negative. Negative for abdominal pain, diarrhea and vomiting. Genitourinary: Positive for decreased urine volume and hematuria. Negative for dysuria. Musculoskeletal: Positive for back pain. Negative for myalgias. Skin: Negative. Negative for rash and wound. Neurological: Negative. Negative for dizziness, weakness and light-headedness. Psychiatric/Behavioral: Negative. Negative for self-injury. All other systems reviewed and are negative. Vitals:  
 08/01/18 2305 BP: 187/64 Pulse: 71 Resp: 18 Temp: 97.8 °F (36.6 °C) SpO2: 99% Physical Exam  
Constitutional: He is oriented to person, place, and time. He appears well-developed and well-nourished. No distress. HENT:  
Head: Normocephalic and atraumatic. Eyes: Conjunctivae and EOM are normal. Pupils are equal, round, and reactive to light. Neck: Normal range of motion. Neck supple. Cardiovascular: Normal rate, regular rhythm, S1 normal and S2 normal.   
Pulmonary/Chest: Effort normal. No accessory muscle usage. No respiratory distress. Abdominal: Soft. Normal appearance. He exhibits no distension. There is tenderness (mild to moderate suprapubic tenderness). There is no rigidity, no rebound and no guarding. No flank tenderness Genitourinary:  
Genitourinary Comments: Zamora catheter in place with a leg bag containing minimal blood Musculoskeletal: Normal range of motion. He exhibits no edema or tenderness. Neurological: He is alert and oriented to person, place, and time. He has normal strength. No cranial nerve deficit or sensory deficit. Coordination normal.  
Skin: Skin is warm and intact. No rash noted. Psychiatric: He has a normal mood and affect. His speech is normal and behavior is normal.  
Vitals reviewed. MDM Number of Diagnoses or Management Options Hematuria, unspecified type:  
Urinary obstruction:  
Diagnosis management comments: Santo Chiang is a 80 y.o. Male coming in with hematuria and obstruction. Three way catheter placed and still draining blood after CBI. Urology consulted and recommened admission. ED Course Procedures Vitals: 
Patient Vitals for the past 12 hrs: 
 Temp Pulse Resp BP SpO2  
08/01/18 2305 97.8 °F (36.6 °C) 71 18 187/64 99 % Pulse ox reviewed and WNL Medications ordered:  
Medications  
morphine injection 4 mg (4 mg IntraVENous Given 8/2/18 0110) lidocaine (URO-JET) 2 % jelly ( Urethral Given 8/2/18 0300) Progress notes, Consult notes or additional Procedure notes:  
Consult:  Discussed care with Dr. Chet Sauceda, Specialty: Urology  Standard discussion; including history of patients chief complaint, available diagnostic results, and treatment course. He requests that we keep the pt NPO and have him admitted to the [facility] practice service. He will evaluate the pt in the ER. 
6:28 AM, 08/02/18 Consult:  Discussed care with Dr. Janak Garcia , Specialty: [facility]  Standard discussion; including history of patients chief complaint, available diagnostic results, and treatment course. He agrees on admission. He requests that we call the residents. 6:37 AM, 08/02/18 Consult:  Discussed care with Dr. Bruno Flores, Specialty: [facility]  Standard discussion; including history of patients chief complaint, available diagnostic results, and treatment course. He agrees on admission. 6:44 AM, 08/02/18 Discussed all results and need for admission and patient is in agreement with plan. Disposition: 
Diagnosis: 1. Hematuria, unspecified type 2. Urinary obstruction Disposition: Admit SCRIBE ATTESTATION STATEMENT Documented by: Juan Manuel Mitchell scribing for, and in the presence of, Etienne Hooper MD 6:53 AM  
 
Signed by: Richard Jordan, 08/02/18 6:53 AM  
 
PROVIDER ATTESTATION STATEMENT I personally performed the services described in the documentation, reviewed the documentation, as recorded by the scribe in my presence, and it accurately and completely records my words and actions.  
Etienne Hooper MD

## 2018-08-02 NOTE — PROGRESS NOTES
Verbal shift change report given to Dong Duran RN (oncoming nurse) by Rosa Cano RN in the ED. Report included the following information SBAR, Kardex, Intake/Output, MAR and Recent Results. 6714- Patient arrived on unit and settled into bed.

## 2018-08-03 NOTE — ROUTINE PROCESS
Bedside and Verbal shift change report given to TIERRA Santamaria (oncoming nurse) by Matty Grady RN (offgoing nurse). Report included the following information SBAR and Kardex.

## 2018-08-03 NOTE — PROGRESS NOTES
Intern Progress Note [address] Patient: Anton Booth MRN: [id number]  CSN: [id number] YOB: 1930  Age: 80 y.o. Sex: male DOA: 8/1/2018 LOS:  LOS: 1 day   PCP: Oksana Schaefer MD  
             
Subjective:  
 
Acute events: had one episode of pain last night while draining bag, which required 1mg Morphine. None since. Stated that pain only happens when bag is elevated above bed and fluid refluxes. No other complaints. Urology to assess need for scope today. Brief ROS: Denies: CP, SOB, HA, Vision changes, new swelling Objective:  
  
Patient Vitals for the past 24 hrs: 
 Temp Pulse Resp BP SpO2  
08/03/18 0729 98.8 °F (37.1 °C) 84 17 183/66 96 % 08/03/18 0534 97 °F (36.1 °C) 76 18 148/64 97 % 08/02/18 2044 97 °F (36.1 °C) 97 20 194/83 98 % 08/02/18 1522 98 °F (36.7 °C) 83 20 174/69 98 % 08/02/18 1118 97 °F (36.1 °C) 72 18 189/67 97 % 08/02/18 0933 96.8 °F (36 °C) 71 18 170/62 97 % Physical Exam:  
General: healthy and alert, NAD Cardiovascular: RRR, holosystolic murmur, No JVD Respiratory: CTAB w/o rales, rhonchi, wheezes, no increased work of breathing Abdomen: Soft, +BS, non-TTP, Non-Distended Extremities: 2+ BL edema in lower extremities bilaterally, 2+ radial pulses intact bilaterally Neuro: Cranial nerves grossly intact, grossly moving upper and lower extremities Skin: No rashes, lesions, or ulcers. Good turgor. Lab/Data Reviewed: All lab results for the last 24 hours reviewed. Recent Results (from the past 24 hour(s)) HGB & HCT Collection Time: 08/02/18 12:00 PM  
Result Value Ref Range HGB 9.1 (L) 13.0 - 16.0 g/dL HCT 27.3 (L) 36.0 - 48.0 % URINALYSIS W/ RFLX MICROSCOPIC Collection Time: 08/02/18  1:00 PM  
Result Value Ref Range Color RED Appearance TURBID Specific gravity 1.005 1.005 - 1.030    
 pH (UA) 6.5 5.0 - 8.0 Protein 100 (A) NEG mg/dL Glucose NEGATIVE  NEG mg/dL Ketone NEGATIVE  NEG mg/dL Bilirubin SMALL (A) NEG Blood LARGE (A) NEG Urobilinogen 0.2 0.2 - 1.0 EU/dL Nitrites POSITIVE (A) NEG Leukocyte Esterase SMALL (A) NEG URINE MICROSCOPIC ONLY Collection Time: 08/02/18  1:00 PM  
Result Value Ref Range WBC  0 - 4 /hpf UNABLE TO QUANTITATE MICROSCOPIC PARAMETERS DUE TO EXCESSIVE RBCS  
 RBC TOO NUMEROUS TO COUNT 0 - 5 /hpf Epithelial cells  0 - 5 /lpf UNABLE TO QUANTITATE MICROSCOPIC PARAMETERS DUE TO EXCESSIVE RBCS Bacteria (A) NEG /hpf UNABLE TO QUANTITATE MICROSCOPIC PARAMETERS DUE TO EXCESSIVE RBCS  
HGB & HCT Collection Time: 08/02/18  4:36 PM  
Result Value Ref Range HGB 8.9 (L) 13.0 - 16.0 g/dL HCT 27.6 (L) 36.0 - 48.0 % METABOLIC PANEL, BASIC Collection Time: 08/02/18  4:36 PM  
Result Value Ref Range Sodium 139 136 - 145 mmol/L Potassium 4.1 3.5 - 5.5 mmol/L Chloride 104 100 - 108 mmol/L  
 CO2 30 21 - 32 mmol/L Anion gap 5 3.0 - 18 mmol/L Glucose 184 (H) 74 - 99 mg/dL BUN 39 (H) 7.0 - 18 MG/DL Creatinine 2.32 (H) 0.6 - 1.3 MG/DL  
 BUN/Creatinine ratio 17 12 - 20 GFR est AA 32 (L) >60 ml/min/1.73m2 GFR est non-AA 27 (L) >60 ml/min/1.73m2 Calcium 8.9 8.5 - 10.1 MG/DL  
HGB & HCT Collection Time: 08/02/18 10:48 PM  
Result Value Ref Range HGB 8.6 (L) 13.0 - 16.0 g/dL HCT 26.3 (L) 36.0 - 48.0 % PSA, DIAGNOSTIC (PROSTATE SPECIFIC AG) Collection Time: 08/03/18  3:00 AM  
Result Value Ref Range Prostate Specific Ag <0.1 0.0 - 4.0 ng/mL CBC W/O DIFF Collection Time: 08/03/18  3:00 AM  
Result Value Ref Range WBC 7.4 4.6 - 13.2 K/uL  
 RBC 2.84 (L) 4.70 - 5.50 M/uL HGB 8.6 (L) 13.0 - 16.0 g/dL HCT 26.5 (L) 36.0 - 48.0 % MCV 93.3 74.0 - 97.0 FL  
 MCH 30.3 24.0 - 34.0 PG  
 MCHC 32.5 31.0 - 37.0 g/dL  
 RDW 13.3 11.6 - 14.5 % PLATELET 846 926 - 760 K/uL MPV 8.9 (L) 9.2 - 75.8 FL  
METABOLIC PANEL, BASIC  Collection Time: 08/03/18  3:00 AM Result Value Ref Range Sodium 137 136 - 145 mmol/L Potassium 5.0 3.5 - 5.5 mmol/L Chloride 105 100 - 108 mmol/L  
 CO2 25 21 - 32 mmol/L Anion gap 7 3.0 - 18 mmol/L Glucose 186 (H) 74 - 99 mg/dL BUN 40 (H) 7.0 - 18 MG/DL Creatinine 2.49 (H) 0.6 - 1.3 MG/DL  
 BUN/Creatinine ratio 16 12 - 20 GFR est AA 30 (L) >60 ml/min/1.73m2 GFR est non-AA 25 (L) >60 ml/min/1.73m2 Calcium 8.4 (L) 8.5 - 10.1 MG/DL Scheduled Medications Reviewed: 
Current Facility-Administered Medications Medication Dose Route Frequency  dextrose 5 % - 0.45% NaCl infusion  125 mL/hr IntraVENous CONTINUOUS  
 latanoprost (XALATAN) 0.005 % ophthalmic solution 1 Drop  1 Drop Both Eyes QHS  amLODIPine (NORVASC) tablet 10 mg  10 mg Oral DAILY  brimonidine (ALPHAGAN) 0.2 % ophthalmic solution 1 Drop  1 Drop Both Eyes Q12H  aspirin delayed-release tablet 81 mg  81 mg Oral DAILY  pantoprazole (PROTONIX) tablet 40 mg  40 mg Oral ACB  docusate sodium (COLACE) capsule 100 mg  100 mg Oral BID  hydrALAZINE (APRESOLINE) tablet 10 mg  10 mg Oral TID  therapeutic multivitamin (THERAGRAN) tablet 1 Tab  1 Tab Oral DAILY  oxybutynin chloride XL (DITROPAN XL) tablet 5 mg  5 mg Oral DAILY  timolol (TIMOPTIC) 0.5 % ophthalmic solution 1 Drop  1 Drop Both Eyes Q12H Imaging, EKG/Telemetry: No results found. Imaging: 
[]I have personally reviewed the patients radiographs []Radiographs reviewed with radiologist 
 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 However, this presentation similar to previous episodes as in [place]. 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  
- cbc, bmp daily 
- urology following 
- continue 3-way 
- NPO 
- Continue plan as per [place] ESTEVAN in CKD3 - Cr 2.55>2. 49. Likely 2/2 above 
- continue IVF d5 1/2NS 125ml HTN urgency - episodes when in pain. - continue amlodipine 10mg 
- hydralazine 10mg Glaucoma - controlled on combigan, latanoprost 
- continue as per pharm Constipation - pt states on senna and colace at home 
- continue colace 
- hold senna - probiotic GERD - controlled with home omeprazole 40mg - Protonix 40mg Diet: Regular, NPO After midnight DVT Prophylaxis: SCD Code Status: Full Point of Contact: Judit Garcia (Daughter) [phone] 
  
Disposition and anticipated LOS: 1 night Naresh Sherman DO, PGY I 
[facility] P[facility] 
08/03/18 
9:00 AM

## 2018-08-03 NOTE — ROUTINE PROCESS
Received  Bedside shift change report from [employer], RN (offgoing nurse) Report included the following information, SBAR, kardex, MAR, and recent results. Assumed care of patient in bed , awake alert. Bladder irrigation infusing draining red draiane. Fall prevention program maintained, call bell and bedside table in reach. Bed alarm on. No problems identified at this time. No complaints made by patient. Will continue care.

## 2018-08-03 NOTE — PROGRESS NOTES
Problem: Falls - Risk of 
Goal: *Absence of Falls Document Richy Fenton Fall Risk and appropriate interventions in the flowsheet. Outcome: Progressing Towards Goal 
Fall Risk Interventions: 
Mobility Interventions: Patient to call before getting OOB, Bed/chair exit alarm Medication Interventions: Evaluate medications/consider consulting pharmacy, Assess postural VS orthostatic hypotension, Patient to call before getting OOB, Teach patient to arise slowly History of Falls Interventions: Evaluate medications/consider consulting pharmacy, Consult care management for discharge planning

## 2018-08-03 NOTE — PROGRESS NOTES
Progress Note Patient: nAgeline Huber MRN: [id number]  SSN: [id number] YOB: 1930  Age: 80 y.o. Sex: male Admit Date: 8/1/2018 LOS: 1 day Assessment:  
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 Acute blood loss anemia 
 hgb somewhat stable today on last two counts at 8.4 Transfuse as needed per primary Prostate ca sp rrp and adjuvant XRT Plan: I irrigated moderate amount of clots at bedside. Advised often I am not able to stop bleeding cystoscopically in setting of radiation cystitis if there is diffuse bleeding. Cont serial H/H Cont CBI, titrate to light pink. OK to eat. CT A/p wo contrast (cannot have contrast due to renal function) to eval upper tracts. Following closely. If further anemia will need cysto with attempts at fulguration, eval for bladder mass. Subjective:  
 
Being manually irrigated every hour or so. cbi running. Objective:  
 
Vitals:  
 08/02/18 2044 08/03/18 0534 08/03/18 0729 08/03/18 1109 BP: 194/83 148/64 183/66 138/59 Pulse: 97 76 84 78 Resp: 20 18 17 16 Temp: 97 °F (36.1 °C) 97 °F (36.1 °C) 98.8 °F (37.1 °C) 98.7 °F (37.1 °C) SpO2: 98% 97% 96% 98% Intake and Output: 
Current Shift: 08/03 0701 - 08/03 1900 In: 1405 Out: 15436 [[id number]] Last three shifts: 08/01 1901 - 08/03 0700 In: 37399 [P.O.:480] Out: 25244 [[id number]:[id number]] Current Facility-Administered Medications Medication Dose Route Frequency  morphine injection 1 mg  1 mg IntraVENous Q3H PRN  
 dextrose 5 % - 0.45% NaCl infusion  125 mL/hr IntraVENous CONTINUOUS  
 naloxone (NARCAN) injection 0.4 mg  0.4 mg IntraVENous PRN  
 latanoprost (XALATAN) 0.005 % ophthalmic solution 1 Drop  1 Drop Both Eyes QHS  amLODIPine (NORVASC) tablet 10 mg  10 mg Oral DAILY  brimonidine (ALPHAGAN) 0.2 % ophthalmic solution 1 Drop  1 Drop Both Eyes Q12H  aspirin delayed-release tablet 81 mg  81 mg Oral DAILY  pantoprazole (PROTONIX) tablet 40 mg  40 mg Oral ACB  docusate sodium (COLACE) capsule 100 mg  100 mg Oral BID  hydrALAZINE (APRESOLINE) tablet 10 mg  10 mg Oral TID  therapeutic multivitamin (THERAGRAN) tablet 1 Tab  1 Tab Oral DAILY  oxybutynin chloride XL (DITROPAN XL) tablet 5 mg  5 mg Oral DAILY  timolol (TIMOPTIC) 0.5 % ophthalmic solution 1 Drop  1 Drop Both Eyes Q12H  
 acetaminophen (TYLENOL) tablet 650 mg  650 mg Oral Q4H PRN Physical Exam:  
GENERAL: alert, cooperative, no distress, appears stated age LUNG: unlabored breathing ABDOMEN: soft, non-tender. No masses,  no organomegaly EXTREMITIES:  extremities normal, atraumatic, no cyanosis or edema SKIN: no jaundice : no CVA tenderness Urine cherry on minimal drip. Lab/Data Review: 
BMP: Lab Results Component Value Date/Time  08/03/2018 03:00 AM  
 K 5.0 08/03/2018 03:00 AM  
  08/03/2018 03:00 AM  
 CO2 25 08/03/2018 03:00 AM  
 AGAP 7 08/03/2018 03:00 AM  
  (H) 08/03/2018 03:00 AM  
 BUN 40 (H) 08/03/2018 03:00 AM  
 CREA 2.49 (H) 08/03/2018 03:00 AM  
 GFRAA 30 (L) 08/03/2018 03:00 AM  
 GFRNA 25 (L) 08/03/2018 03:00 AM  
 
CBC: Lab Results Component Value Date/Time WBC 7.4 08/03/2018 03:00 AM  
 HGB 8.4 (L) 08/03/2018 11:50 AM  
 HCT 26.0 (L) 08/03/2018 11:50 AM  
  08/03/2018 03:00 AM  
 
COAGS: No results found for: APTT, PTP, INR  
 
 
CT Results: 
 
Results from Hospital Encounter encounter on 01/31/14 CT ABD PELV W WO CONT Narrative CT ABDOMEN/PELVIS 
 
CPT code: 72366, 28540 History: Followup renal cyst.  Microscopic hematuria. History of prostate 
carcinoma. Technique: Helical axial images of the abdomen and pelvis were obtained from 
the domes of the diaphragm through the symphysis pubis following administration 
of intravenous contrast material.  Contrast: Optiray-320 - 80 mL IV; uneventful 
administration.   Comparison studies are CT abdomen of 27 October 2010 and CT of 
the chest, abdomen and pelvis of 1 October 2008. Findings: 
CT Abdomen: The visualized portion of the lung bases is unremarkable. Bolus 
small subcentimeter low-attenuation lesions are seen scattered throughout the 
liver largest are a 13 mm lesion in the anteroinferior lateral segment of the 
left lobe (segment III) and a 12 mm lesion in the posterior right lobe abutting 
the gallbladder fossa (segment V). There is a probable constricting ringlike 
soft tissue density in the fundus of the gallbladder, most likely subsegmental 
adenomyomatosis. The gallbladder is otherwise unremarkable except for some 
septations or folds. There is prominence of the extrahepatic biliary ducts 
with a maximum common hepatic duct diameter of 10 mm. There is no definite 
distal obstructing lesion or calculus. No significant intrahepatic biliary 
duct dilatation is seen. The tomographic nephrogram is unremarkable, 
bilaterally, except for a small cortical cyst in the anterior mid right kidney 
and several small low-attenuation lesions in the left kidney which are too 
small to accurately characterize but are also most likely cortical cysts. There is a prominent posterior mid left renal 3 cm cyst which is mildly 
hyperdense for simple water. This has increased from 2 cm diameter on the 
prior study. There is no hydronephrosis or nephrolithiasis. There may be a 
small hiatus hernia. Multiple diverticula are noted in the descending colon 
but without significant inflammatory change. The remainder of the bowel is 
unremarkable and the retrocecal appendix is normal.  There is no significant 
retroperitoneal lymphadenopathy. No significant vascular abnormalities are 
seen. There is a mild anterolisthesis of L4 on L5 with gross effacement of the 
disc space other bony structures are unremarkable and no soft tissue 
abnormalities are seen.  
 
CT Pelvis: Multiple diverticula are noted in the sigmoid and distal descending 
colon but without significant inflammatory change. The remainder the 
visualized portion of bowel is unremarkable. No significant adenopathy is 
seen. There has been a probable open prostatectomy. The bladder is otherwise 
unremarkable and the distal ureters are normal.  The bones and soft tissues are 
unremarkable. Impression IMPRESSION[de-identified] 
 
There has been interval increase in size in the posterior mid left renal 
hyperdense cyst from 2 cm to 3 cm since the most recent study. Other small 
low-attenuation lesions are unchanged and are most likely cortical cysts. Repeat ultrasound or possibly MRI may be helpful. Multiple small low-attenuation hepatic lesions, too small to accurately 
characterize but most likely small hepatic cysts given the low density for 
size. Probable small focus of segmental adenomyomatosis in the fundus of the 
gallbladder. No calculi are seen. There is mild prominence of the 
extrahepatic biliary ducts without evidence of distal calculus or obstructing 
lesion. Diverticulosis coli, mainly sigmoid and descending colon but without 
significant inflammatory change. The remainder the bowel is unremarkable. Probable open prostatectomy. Mild anterolisthesis L4-5 with almost complete effacement of the disc space. US Results: 
 
Results from Abstract encounter on 08/31/12 US RETROPERITONEAL Principal Problem: 
  Hematuria, gross (8/2/2018) Active Problems: 
  Prostate cancer ([address] (8/31/2012) Urinary obstruction (8/2/2018) Hematuria (8/2/2018) Signed By: Violet Mesa MD , FACS August 3, 2018 PAGER:  [phone] OFFICE:  [phone] [id number] [facility]

## 2018-08-03 NOTE — HOME CARE
**ACO rounding** Patient's daughter, Alem Mcintosh, at bedside. Per patient, he doesn't feel like he needs skilled care at this point but he does live alone. Skilled services and HB requirement explained to the daughter and patient. DME: has a straight cane - no other DME per the daughter. Liaison nurse to leave a brochure explaining skilled home care through [facility] and patient/family may follow up with the  or home care liaisons if further information is requested - ANDRES Gould LPN

## 2018-08-04 NOTE — PROGRESS NOTES
Intern Progress Note [address] [facility] Patient: Benny Pritchard MRN: [id number]  CSN: [id number] YOB: 1930  Age: 80 y.o. Sex: male DOA: 8/1/2018 LOS:  LOS: 2 days   PCP: Elvis Clancy MD  
             
Subjective:  
 
Acute events: Still endorsing pain and discomfort with the catheter at the tip of his penis. Per the patient and nursing he required irrigations multiple times with clots. Still endorsing constipation and desire for BM, requesting for glycerin suppository. Brief ROS: Endorses pain with pressure at his meatus. Denies: CP, SOB, HA, Vision changes, new swelling Objective:  
  
Patient Vitals for the past 24 hrs: 
 Temp Pulse Resp BP SpO2  
08/04/18 0400 99.5 °F (37.5 °C) 65 16 154/60 98 % 08/04/18 0000 98.2 °F (36.8 °C) 76 16 163/61 97 % 08/03/18 1828 98.5 °F (36.9 °C) 87 16 165/55 99 % 08/03/18 1812 - - - 136/70 -  
08/03/18 1109 98.7 °F (37.1 °C) 78 16 138/59 98 % 08/03/18 0729 98.8 °F (37.1 °C) 84 17 183/66 96 % Physical Exam:  
General: healthy and alert, NAD, laying comfortably in bed Cardiovascular: RRR, holosystolic murmur, No JVD Respiratory: CTAB w/o rales, rhonchi, wheezes, no increased work of breathing Abdomen: Soft, +BS, maybe slightly distended with TTP in the suprapubic region Extremities: 1-2+ BL edema in lower extremities bilaterally, 2+ radial pulses intact bilaterally Lab/Data Reviewed: All lab results for the last 24 hours reviewed. Recent Results (from the past 24 hour(s)) HGB & HCT Collection Time: 08/03/18 11:50 AM  
Result Value Ref Range HGB 8.4 (L) 13.0 - 16.0 g/dL HCT 26.0 (L) 36.0 - 48.0 % METABOLIC PANEL, BASIC Collection Time: 08/04/18  1:52 AM  
Result Value Ref Range Sodium 142 136 - 145 mmol/L Potassium 4.5 3.5 - 5.5 mmol/L Chloride 107 100 - 108 mmol/L  
 CO2 27 21 - 32 mmol/L Anion gap 8 3.0 - 18 mmol/L Glucose 128 (H) 74 - 99 mg/dL  BUN 37 (H) 7.0 - 18 MG/DL Creatinine 2.11 (H) 0.6 - 1.3 MG/DL  
 BUN/Creatinine ratio 18 12 - 20 GFR est AA 36 (L) >60 ml/min/1.73m2 GFR est non-AA 30 (L) >60 ml/min/1.73m2 Calcium 8.0 (L) 8.5 - 10.1 MG/DL Scheduled Medications Reviewed: 
Current Facility-Administered Medications Medication Dose Route Frequency  dextrose 5 % - 0.45% NaCl infusion  125 mL/hr IntraVENous CONTINUOUS  
 latanoprost (XALATAN) 0.005 % ophthalmic solution 1 Drop  1 Drop Both Eyes QHS  amLODIPine (NORVASC) tablet 10 mg  10 mg Oral DAILY  brimonidine (ALPHAGAN) 0.2 % ophthalmic solution 1 Drop  1 Drop Both Eyes Q12H  aspirin delayed-release tablet 81 mg  81 mg Oral DAILY  pantoprazole (PROTONIX) tablet 40 mg  40 mg Oral ACB  docusate sodium (COLACE) capsule 100 mg  100 mg Oral BID  hydrALAZINE (APRESOLINE) tablet 10 mg  10 mg Oral TID  therapeutic multivitamin (THERAGRAN) tablet 1 Tab  1 Tab Oral DAILY  oxybutynin chloride XL (DITROPAN XL) tablet 5 mg  5 mg Oral DAILY  timolol (TIMOPTIC) 0.5 % ophthalmic solution 1 Drop  1 Drop Both Eyes Q12H Imaging, EKG/Telemetry: 
Ct Abd Pelv Wo Cont Addendum Date: 8/3/2018 Addendum: Additional impression: Probable atelectasis in the left lower lobe dependently. Result Date: 8/3/2018 IMPRESSION: 1. There is suspicion for intrahepatic and extrahepatic biliary dilatation. Further imaging with ultrasound may be confirmatory. Etiology is uncertain. There is no pancreatic head mass. 2. Lobulated gallbladder. Calcified stones were noted in the lumen. There is no definite CT evidence for cholecystitis. No evidence for genitourinary tract stone disease. 3. Sclerotic lesion in the right ilium and the left ischium. Significance is uncertain. Blastic metastatic disease cannot be excluded. 4. There is suggestion of bladder wall thickening but the bladder was not fully distended. . Zamora catheter is in the bladder lumen. Air was introduced into the lumen.  5. Hyperdense lesion in the left renal cortex may represent a hyperdense cyst. This was seen on the January 31, 2014 exam and is slightly larger. No follow-up is needed 6. A cystic lesion in the posterior cortex of the left kidney seen on the prior examination has significantly decreased in size 7. Small volume of free fluid in the dependent pelvis. Clinical significance is uncertain a. Diverticulosis of the descending and sigmoid colon without diverticulitis 9. Previous pelvic surgery. Prostatectomy? Imaging: 
[x]I have personally reviewed the patients radiographs []Radiographs reviewed with radiologist 
 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 However, this presentation similar to previous episodes as in [place]. 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 - cbc, bmp daily 
- urology following 
- continue 3-way catheter - Regular diet 
- Continue plan as per HP Blood loss anemia - 2/2 to above Hgb down to 7.5 from a baseline around 8 
-Has not needed transfusions 
-Consider transfusion if Hgb drops below 7 
-Repeat H/H this afternoon ESTEVAN in CKD3 - Improving Cr 2.11 today. Likely 2/2 above 
- continue IVF d5 1/2NS 125ml HTN - episodes when in pain. - continue amlodipine 10mg 
- hydralazine 10mg Constipation - pt states on senna and colace at home 
- continue colace - Will give suppository today 
- hold senna - probiotic GERD - controlled with home omeprazole 40mg - Protonix 40mg Diet: Regular DVT Prophylaxis: SCD Code Status: Full Point of Contact: Asia White (Daughter) [phone] 
  
Disposition and anticipated LOS: 1 night DO EARLENE Curry 
08/04/18 8:04 AM

## 2018-08-04 NOTE — PROGRESS NOTES
Bedside report from [place], [facility]. Pt VS stable. Pt CBI draining.

## 2018-08-04 NOTE — PROGRESS NOTES
Bedside verbal report given to Fazal Borjas RN (oncoming nurse) by Jorje Fernandez RN (offgoing nurse).

## 2018-08-04 NOTE — PROGRESS NOTES
Progress Note Patient: Kasandra Hatch MRN: [id number]  SSN: [id number] YOB: 1930  Age: 80 y.o. Sex: male Admit Date: 8/1/2018 Assessment:  
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 Acute blood loss anemia 
 hgb 7.5 from 8.4 Transfuse as needed per primary Prostate ca sp rrp and adjuvant XRT Plan:  
 
The 3-way catheter was on the floor when I arrived. I replaced the catheter without difficulty. I irrigated a few clots and restarted CBI on moderate drip. PAin med and B&O supp Increased ditropan to 10mg Bowel regimen Cont serial H/H Cont CBI, titrate to light pink. OK to eat. CT A/p wo contrast showed no significant clots in the bladder Following closely. If further anemia will need cysto with attempts at fulguration, eval for bladder mass. Subjective:  
 
Being manually irrigated every hour or so. cbi running. Objective:  
 
Vitals:  
 08/04/18 0400 08/04/18 0738 08/04/18 1205 08/04/18 1408 BP: 154/60 160/60 144/63 Pulse: 65 82 84 Resp: 16 15 18 Temp: 99.5 °F (37.5 °C) 100.3 °F (37.9 °C) 98.5 °F (36.9 °C) (!) 100.7 °F (38.2 °C) SpO2: 98% 98% 98% Intake and Output: 
Current Shift: 08/04 0701 - 08/04 1900 In: [id number] [P.O.:240; I.V.:1500] Out: 924 [facility] [[id number]] Last three shifts: 08/02 1901 - 08/04 0700 In: [id number] [I.V.:1250] Out: 08075 [Urine:[id number]] Current Facility-Administered Medications Medication Dose Route Frequency  morphine injection 1 mg  1 mg IntraVENous Q3H PRN  
 dextrose 5 % - 0.45% NaCl infusion  125 mL/hr IntraVENous CONTINUOUS  
 naloxone (NARCAN) injection 0.4 mg  0.4 mg IntraVENous PRN  
 latanoprost (XALATAN) 0.005 % ophthalmic solution 1 Drop  1 Drop Both Eyes QHS  amLODIPine (NORVASC) tablet 10 mg  10 mg Oral DAILY  brimonidine (ALPHAGAN) 0.2 % ophthalmic solution 1 Drop  1 Drop Both Eyes Q12H  aspirin delayed-release tablet 81 mg  81 mg Oral DAILY  pantoprazole (PROTONIX) tablet 40 mg  40 mg Oral ACB  docusate sodium (COLACE) capsule 100 mg  100 mg Oral BID  hydrALAZINE (APRESOLINE) tablet 10 mg  10 mg Oral TID  therapeutic multivitamin (THERAGRAN) tablet 1 Tab  1 Tab Oral DAILY  oxybutynin chloride XL (DITROPAN XL) tablet 5 mg  5 mg Oral DAILY  timolol (TIMOPTIC) 0.5 % ophthalmic solution 1 Drop  1 Drop Both Eyes Q12H  
 acetaminophen (TYLENOL) tablet 650 mg  650 mg Oral Q4H PRN Physical Exam:  
GENERAL: alert, cooperative, no distress, appears stated age LUNG: unlabored breathing ABDOMEN: soft, non-tender. No masses,  no organomegaly EXTREMITIES:  extremities normal, atraumatic, no cyanosis or edema SKIN: no jaundice : no CVA tenderness Urine cherry on minimal drip. Lab/Data Review: 
BMP:  
Lab Results Component Value Date/Time  08/04/2018 01:52 AM  
 K 4.5 08/04/2018 01:52 AM  
  08/04/2018 01:52 AM  
 CO2 27 08/04/2018 01:52 AM  
 AGAP 8 08/04/2018 01:52 AM  
  (H) 08/04/2018 01:52 AM  
 BUN 37 (H) 08/04/2018 01:52 AM  
 CREA 2.11 (H) 08/04/2018 01:52 AM  
 GFRAA 36 (L) 08/04/2018 01:52 AM  
 GFRNA 30 (L) 08/04/2018 01:52 AM  
 
CBC:  
Lab Results Component Value Date/Time WBC 7.7 08/04/2018 01:52 AM  
 HGB 7.5 (L) 08/04/2018 01:52 AM  
 HCT 22.7 (L) 08/04/2018 01:52 AM  
  08/04/2018 01:52 AM  
 
COAGS: No results found for: APTT, PTP, INR  
 
 
CT Results: 
 
Results from Hospital Encounter encounter on 08/01/18 CT ABD PELV WO CONT Addendum Addendum: Additional  impression: Probable atelectasis in the left lower lobe dependently. Mary Jo Sims MD 8/3/2018  8:01 PM         
 
 Narrative EXAM: CT Scan Of Abdomen And Pelvis with IV contrast. 
 
INDICATION:  gross hematuria COMPARISON: January 31, 2014.  
 
TECHNIQUE: CT images of the abdomen and pelvis were obtained from the lower 
chest through the pelvic floor without IV or oral contrast. Sagittal and coronal 
reconstruction images were performed to visualize structures in multiple planes. Radiation safety: All CT examinations at this institution for performed with 
radiation reduction technique including automated KV and MA based on patient's 
body size and body part scanned. Multiplanar reconstruction images were 
performed to visualize structures in multiple planes without additional 
radiation exposure. CT Scan Of The Abdomen:  Left lower lobe has peripheral brain with densities 
suggesting atelectasis. There is no pleural effusion. Heart is normal in size 
without pericardial effusion. Liver has normal density. There is evidence for intrahepatic biliary dilatation. Common bile duct also appear dilated. The pancreatic head the common bile duct 
measures 13 mm. Gallbladder is distended. Gallbladder configuration is lobulated. There is 
calcified stones layering dependently in the gallbladder. Pancreas not demonstrate any focal lesions or inflammation. Spleen is normal in size. Adrenal glands appear normal. 
 
Right kidney show no hydronephrosis. No suspicious consultation was seen. Right 
ureter has normal caliber without suspicious scalp patient. Left kidney has a hyperdense lesion laterally measuring 13 x 13 mm. This could 
be a hyperdense cyst. Previously noted cystic lesion in the posterior cortex of 
the left kidney is significant a smaller. There is no hydronephrosis. No renal 
stone was seen. Left ureter has normal caliber without suspicious 
calcifications. There is a small hiatal hernia. Stomach is otherwise unremarkable. Small bowel 
loops appear unremarkable. Appendix appear normal. There is diverticulosis of 
the descending colon. CT Scan Of The Pelvis: There is diverticulosis of the sigmoid colon without 
diverticulitis. Small bowel loops in the pelvis appear unremarkable. There is a Zamora catheter in the bladder. Bladder wall may be thickened.  However 
the bladder is insufficiently distended for this evaluation. Bladder lumen is 
partially filled with urine and air. Prostate gland does not appear enlarged. There is a small volume of free fluid in the dependent pelvis. Surgical clips 
are present posterior to the prostate gland. No pelvic adenopathy was seen. L5 vertebral body is sclerotic inferiorly which may be related to degenerative 
disc disease. There is vacuum disc phenomenon at L5-S1. 
-Blastic appearing lesions were noted in the right ilium adjacent to the SI 
joint. There is a solitary 8 mm sclerotic lesion in the left ischium near the 
acetabulum. Impression IMPRESSION: 
1. There is suspicion for intrahepatic and extrahepatic biliary dilatation. Further imaging with ultrasound may be confirmatory. Etiology is uncertain. There is no pancreatic head mass. 2. Lobulated gallbladder. Calcified stones were noted in the lumen. There is no 
definite CT evidence for cholecystitis. No evidence for genitourinary tract 
stone disease. 3. Sclerotic lesion in the right ilium and the left ischium. Significance is 
uncertain. Blastic metastatic disease cannot be excluded. 4. There is suggestion of bladder wall thickening but the bladder was not fully 
distended. . Zamora catheter is in the bladder lumen. Air was introduced into the 
lumen. 5. Hyperdense lesion in the left renal cortex may represent a hyperdense cyst. 
This was seen on the January 31, 2014 exam and is slightly larger. No follow-up 
is needed 6. A cystic lesion in the posterior cortex of the left kidney seen on the prior 
examination has significantly decreased in size 7. Small volume of free fluid in the dependent pelvis. Clinical significance is 
uncertain a. Diverticulosis of the descending and sigmoid colon without 
diverticulitis 9. Previous pelvic surgery. Prostatectomy? US Results: 
 
Results from Abstract encounter on 08/31/12 US RETROPERITONEAL Principal Problem: 
  Hematuria, gross (8/2/2018) Active Problems: 
  Prostate cancer ([facility] [address] (8/31/2012) Urinary obstruction (8/2/2018) Hematuria (8/2/2018) Signed By: Socorro Hurd MD , FACS August 4, 2018

## 2018-08-05 NOTE — PROGRESS NOTES
responded to Death of  Wero Mcneill, who was a 80 y. o.,male, The  provided the following Interventions for Patient Family: 
Provided crisis pastoral care, pastoral support and grief interventions. Offered prayers on behalf of the patient family. Chart reviewed. Plan: 
Chaplains will continue to follow and will provide pastoral care on an as needed/requested basis and grief support for the family. Maryse Maria M.Div. [facility] Spiritual Care Department [phone]

## 2018-08-05 NOTE — PROGRESS NOTES
Pt found unresponsive, pulseless, not breathing and vomit was on face, chest and both sides of neck. Code blue initiated.

## 2018-08-05 NOTE — PROGRESS NOTES
responded to a [employer] for 4 H Stevie Ramirez., who is a 80 y. o.,male. Patients Primary Language is: [place]. According to the patients EMR Islam Affiliation is: Yazidi. The reason the Patient came to the hospital is:  
Patient Active Problem List  
 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  
 OAB (overactive bladder) 02/09/2017  Chronic bilateral low back pain with sciatica 09/15/2016  Lumbar stenosis 07/07/2016  Lumbar neuritis 07/07/2016  Bone metastases ([facility] [address] 01/02/2015  Renal mass 08/31/2012  Back pain 08/31/2012  Prostate cancer ([facility] [address] 08/31/2012  Gout 08/31/2012  Asthma 08/31/2012  Hypertension 08/31/2012  Thyroid disease 08/31/2012 The  provided the following Interventions: N/A The following outcomes were achieved: No family present at time of code. Assessment: N/A Plan: 
Chaplains will continue to follow and will provide pastoral care on an as needed/requested basis. Yordy Kennedy M.Div.  Spiritual Care Department [phone]

## 2018-08-05 NOTE — PROGRESS NOTES
Pt complaining of SOB. Pt placed on 2L O2 via nasal cannula. Verified that pt HOB is 30 or more. Pt still feeling nauseous and bloated. CBI still with intermittent flowing, requiring constant declotting of irrigation line.

## 2018-08-05 NOTE — PROGRESS NOTES
Death Pronouncement [address] Patient lying in bed, mouth open, eyes closed. Pupils fixed and equal bilaterally. No response to verbal or painful stimuli. No heart or lung sounds auscultated. No carotid or peripheral pulses. Death officially pronounced at 03:20 am, 8/5/2018 ALVARO [place],   
[address] August 5, 2018

## 2018-08-05 NOTE — PROGRESS NOTES
Pt having large clots and CBI is not flowing. 50cc large bloody clots removed from cerda. Paged PFM Dr. Mateo Florian and made awake. He will come look at patient. Dr. Mateo Florian ordered 1mg more of morphine and increased dose to 2mg IV. Paged urology. Spoke to Dr. Jimbo Dent who ordered pt be NPO for possible OR tomorrow and to try to keep CBI flowing if we can.

## 2018-08-05 NOTE — PROGRESS NOTES
CODE BLUE NOTE 
[facility] Service Patient: Fransico Casas MRN: [id number] SSN: [id number]  YOB: 1930 Age: 80 y.o. Sex: male Admit date: 8/1/2018 Length of stay:  LOS: 3 days PCP: Patty Palomino MD PP: Hematuria, gross Comments:  
Code called by nursing due to respiratory and cardiac arrest. 
Pt without pulses, CPR initiated by nursing. Pt intubated by anesthesia. Mr. Liam Frederick is an 81 y/o admitted on 8/2/18 for gross hematuria thought to be 2/2 radiation cystitis. Also had blood loss anemia with Hgb dropping from 10.5 on admission to 7.5 this evening, then further to 7.0 after that. Urology has been consulted and pt was receiving continuous bladder irrigation. Problems developed with flow earlier in evening with multiple clots having to be pulled from catheter. Urology was called and rec'ed to continue to manage bladder irrigation as much as possible, and to make him NPO for possible urological procedure in am. Pt seen in room with RN soon thereafter, was A&OX3, somewhat uncomfortable, and c/o suprapubic abd pain and nausea. Abd was soft but tender. Morphine and Zofran ordered for pt's comfort and given. He was reportedly more comfortable afterwards and was able to go to sleep for a bit. RN checked on pt later this evening and found him with copious emesis covering his mouth and neck, apneic and pulseless. Code blue then called. Rhythm: Asystole, pt entered PEA intermittently late into the code but reverted to asystole each time within approx 10 seconds Medications:   
Epi x 5 doses. Amio x 0 doses. Bicarb x 2 doses. Calcium carbonate X1 dose. Narcan X1 dose. Assessment and Plan:  
80 y.o. yo male s/p respiratory and cardiac arrest. 
 
Cardiac arrest 
- CPR performed for approx 28 minutes - Asystole on monitor almost exclusively, occasional short runs of PEA for <10 sec - No return to any organized rhythm for longer than 10 sec - Epi X5, Bicarb X2, Calcium carbonate X1, Narcan X1 given - After prolonged resuscitative efforts with no pulse or organized electrical activity on monitor, resuscitative efforts considered futile and terminated - Discussed events with pt's daughter, Gisselle Geller who understood and will come to the hospital   
 
Attending physician, Dr. Miky Malone, notified. Code started at 02:52 am and concluded at 03:20 am.  
 
Signed, H. Justen Dotson, DO  
[facility] August 5, 2018

## 2018-08-05 NOTE — PROGRESS NOTES
Pt complaining of nausea but no meds ordered. Paged PFM resident and got order for Zofran IV. Pt is still clotting and CBI is not flowing well, requires constant monitoring and declotting, cerda remains intact. Pt still feeling discomfort.

## 2018-08-06 LAB
BACTERIA SPEC CULT: NORMAL
SERVICE CMNT-IMP: NORMAL

## 2018-08-09 NOTE — PROGRESS NOTES
Coding Query    Between the hours of 0045 and 0245 pt aspirated vomitus into his lungs.     Divya Prom, DO, PGY I  [facility]  08/09/18  3:07 PM

## 2020-09-14 NOTE — ED NOTES
Pt family member to run errands, will be gone \"20-30\" mins would like call if needed [phone] Taryn Fontana Quality 111:Pneumonia Vaccination Status For Older Adults: Pneumococcal Vaccination not Administered or Previously Received, Reason not Otherwise Specified Detail Level: Detailed Quality 130: Documentation Of Current Medications In The Medical Record: Current Medications Documented Quality 431: Preventive Care And Screening: Unhealthy Alcohol Use - Screening: Patient screened for unhealthy alcohol use using a single question and scores less than 2 times per year Quality 226: Preventive Care And Screening: Tobacco Use: Screening And Cessation Intervention: Patient screened for tobacco use, is a smoker AND received Cessation Counseling Quality 110: Preventive Care And Screening: Influenza Immunization: Influenza immunization was not ordered or administered, reason not given

## (undated) DEVICE — MEDIA CONTRAST 10ML 200MG/ML 41%

## (undated) DEVICE — SET EPI 18GA L3.5IN TUOHY NDL W/ 20GA CLS TIP NYL CATH

## (undated) DEVICE — (D)SYR 10ML 1/5ML GRAD NSAF -- PKGING CHANGE USE ITEM 338027

## (undated) DEVICE — (D)BNDG ADHESIVE FABRIC 3/4X3 -- DISC BY MFR USE ITEM 357960

## (undated) DEVICE — NEEDLE EPI 18GA L3.5IN CLR HUB TUOHY W/ WNG PERIFIX

## (undated) DEVICE — Device: Brand: MEDEX